# Patient Record
Sex: FEMALE | Race: WHITE | NOT HISPANIC OR LATINO | Employment: PART TIME | URBAN - METROPOLITAN AREA
[De-identification: names, ages, dates, MRNs, and addresses within clinical notes are randomized per-mention and may not be internally consistent; named-entity substitution may affect disease eponyms.]

---

## 2017-05-23 ENCOUNTER — HISTORICAL (OUTPATIENT)
Dept: RADIOLOGY | Facility: HOSPITAL | Age: 47
End: 2017-05-23

## 2017-06-09 ENCOUNTER — HISTORICAL (OUTPATIENT)
Dept: RADIOLOGY | Facility: HOSPITAL | Age: 47
End: 2017-06-09

## 2017-12-06 ENCOUNTER — HISTORICAL (OUTPATIENT)
Dept: RADIOLOGY | Facility: HOSPITAL | Age: 47
End: 2017-12-06

## 2018-02-15 ENCOUNTER — HISTORICAL (OUTPATIENT)
Dept: RADIOLOGY | Facility: HOSPITAL | Age: 48
End: 2018-02-15

## 2018-03-07 ENCOUNTER — HISTORICAL (OUTPATIENT)
Dept: RADIOLOGY | Facility: HOSPITAL | Age: 48
End: 2018-03-07

## 2018-03-15 ENCOUNTER — HISTORICAL (OUTPATIENT)
Dept: RADIOLOGY | Facility: HOSPITAL | Age: 48
End: 2018-03-15

## 2018-03-16 ENCOUNTER — HISTORICAL (OUTPATIENT)
Dept: LAB | Facility: HOSPITAL | Age: 48
End: 2018-03-16

## 2018-04-02 ENCOUNTER — HISTORICAL (OUTPATIENT)
Dept: RADIOLOGY | Facility: HOSPITAL | Age: 48
End: 2018-04-02

## 2018-04-03 ENCOUNTER — HISTORICAL (OUTPATIENT)
Dept: INFUSION THERAPY | Facility: HOSPITAL | Age: 48
End: 2018-04-03

## 2018-04-03 LAB
ABS NEUT (OLG): 3.8 X10(3)/MCL (ref 1.5–6.9)
ALBUMIN SERPL-MCNC: 3.8 GM/DL (ref 3.4–5)
ALBUMIN/GLOB SERPL: 0.8 RATIO
ALP SERPL-CCNC: 77 UNIT/L (ref 30–113)
ALT SERPL-CCNC: 19 UNIT/L (ref 10–45)
AST SERPL-CCNC: 16 UNIT/L (ref 15–37)
BASOPHILS # BLD AUTO: 0 X10(3)/MCL (ref 0–0.1)
BASOPHILS NFR BLD AUTO: 0 % (ref 0–1)
BILIRUB SERPL-MCNC: 0.4 MG/DL (ref 0.1–0.9)
BILIRUBIN DIRECT+TOT PNL SERPL-MCNC: 0.1 MG/DL (ref 0–0.3)
BILIRUBIN DIRECT+TOT PNL SERPL-MCNC: 0.3 MG/DL
BUN SERPL-MCNC: 11 MG/DL (ref 10–20)
CALCIUM SERPL-MCNC: 8.8 MG/DL (ref 8–10.5)
CHLORIDE SERPL-SCNC: 100 MMOL/L (ref 100–108)
CO2 SERPL-SCNC: 32 MMOL/L (ref 21–35)
CREAT SERPL-MCNC: 1.25 MG/DL (ref 0.7–1.3)
EOSINOPHIL # BLD AUTO: 0.2 X10(3)/MCL (ref 0–0.6)
EOSINOPHIL NFR BLD AUTO: 2 % (ref 0–5)
ERYTHROCYTE [DISTWIDTH] IN BLOOD BY AUTOMATED COUNT: 12.3 % (ref 11.5–17)
GLOBULIN SER-MCNC: 4.5 GM/DL
GLUCOSE SERPL-MCNC: 88 MG/DL (ref 75–116)
HBV SURFACE AG SERPL QL IA: NEGATIVE
HCT VFR BLD AUTO: 42.1 % (ref 36–48)
HCV AB SERPL QL IA: NEGATIVE
HGB BLD-MCNC: 13.9 GM/DL (ref 12–16)
HIV 1+2 AB+HIV1 P24 AG SERPL QL IA: NEGATIVE
LDH SERPL-CCNC: 155 UNIT/L (ref 112–240)
LYMPHOCYTES # BLD AUTO: 3.4 X10(3)/MCL (ref 0.5–4.1)
LYMPHOCYTES NFR BLD AUTO: 42.7 % (ref 15–40)
MCH RBC QN AUTO: 31 PG (ref 27–34)
MCHC RBC AUTO-ENTMCNC: 33 GM/DL (ref 31–36)
MCV RBC AUTO: 94 FL (ref 80–99)
MONOCYTES # BLD AUTO: 0.4 X10(3)/MCL (ref 0–1.1)
MONOCYTES NFR BLD AUTO: 6 % (ref 4–12)
NEUTROPHILS # BLD AUTO: 3.8 X10(3)/MCL (ref 1.5–6.9)
NEUTROPHILS NFR BLD AUTO: 48 % (ref 43–75)
PLATELET # BLD AUTO: 236 X10(3)/MCL (ref 140–400)
PMV BLD AUTO: 10.2 FL (ref 6.8–10)
POTASSIUM SERPL-SCNC: 3.9 MMOL/L (ref 3.6–5.2)
PROT SERPL-MCNC: 8.3 GM/DL (ref 6.4–8.2)
RBC # BLD AUTO: 4.5 X10(6)/MCL (ref 4.2–5.4)
SODIUM SERPL-SCNC: 140 MMOL/L (ref 135–145)
URATE SERPL-MCNC: 4.7 MG/DL (ref 2.6–7.2)
WBC # SPEC AUTO: 7.9 X10(3)/MCL (ref 4.5–11.5)

## 2018-04-05 ENCOUNTER — HISTORICAL (OUTPATIENT)
Dept: ANESTHESIOLOGY | Facility: HOSPITAL | Age: 48
End: 2018-04-05

## 2018-04-05 LAB
ABS NEUT (OLG): 5 X10(3)/MCL (ref 1.5–6.9)
BASOPHILS # BLD AUTO: 0 X10(3)/MCL (ref 0–0.1)
BASOPHILS NFR BLD AUTO: 0 % (ref 0–1)
EOSINOPHIL # BLD AUTO: 0.3 X10(3)/MCL (ref 0–0.6)
EOSINOPHIL NFR BLD AUTO: 3 % (ref 0–5)
ERYTHROCYTE [DISTWIDTH] IN BLOOD BY AUTOMATED COUNT: 12.4 % (ref 11.5–17)
HCT VFR BLD AUTO: 42.9 % (ref 36–48)
HGB BLD-MCNC: 13.9 GM/DL (ref 12–16)
LYMPHOCYTES # BLD AUTO: 3.8 X10(3)/MCL (ref 0.5–4.1)
LYMPHOCYTES NFR BLD AUTO: 38.8 % (ref 15–40)
MCH RBC QN AUTO: 30 PG (ref 27–34)
MCHC RBC AUTO-ENTMCNC: 32 GM/DL (ref 31–36)
MCV RBC AUTO: 94 FL (ref 80–99)
MONOCYTES # BLD AUTO: 0.7 X10(3)/MCL (ref 0–1.1)
MONOCYTES NFR BLD AUTO: 7 % (ref 4–12)
NEUTROPHILS # BLD AUTO: 5 X10(3)/MCL (ref 1.5–6.9)
NEUTROPHILS NFR BLD AUTO: 51 % (ref 43–75)
PLATELET # BLD AUTO: 236 X10(3)/MCL (ref 140–400)
PMV BLD AUTO: 10.3 FL (ref 6.8–10)
RBC # BLD AUTO: 4.59 X10(6)/MCL (ref 4.2–5.4)
WBC # SPEC AUTO: 9.9 X10(3)/MCL (ref 4.5–11.5)

## 2018-04-09 ENCOUNTER — HISTORICAL (OUTPATIENT)
Dept: RADIOLOGY | Facility: HOSPITAL | Age: 48
End: 2018-04-09

## 2018-04-09 ENCOUNTER — HISTORICAL (OUTPATIENT)
Dept: SURGERY | Facility: HOSPITAL | Age: 48
End: 2018-04-09

## 2018-04-09 LAB
ALBUMIN SERPL-MCNC: 3.6 GM/DL (ref 3.4–5)
ALBUMIN/GLOB SERPL: 0.8 RATIO
ALP SERPL-CCNC: 73 UNIT/L (ref 30–113)
ALT SERPL-CCNC: 19 UNIT/L (ref 10–45)
APTT PPP: 27.5 SECOND(S) (ref 25–35)
AST SERPL-CCNC: 17 UNIT/L (ref 15–37)
BILIRUB SERPL-MCNC: 0.3 MG/DL (ref 0.1–0.9)
BILIRUBIN DIRECT+TOT PNL SERPL-MCNC: 0.1 MG/DL (ref 0–0.3)
BILIRUBIN DIRECT+TOT PNL SERPL-MCNC: 0.2 MG/DL
BUN SERPL-MCNC: 10 MG/DL (ref 10–20)
CALCIUM SERPL-MCNC: 9.2 MG/DL (ref 8–10.5)
CHLORIDE SERPL-SCNC: 103 MMOL/L (ref 100–108)
CO2 SERPL-SCNC: 34 MMOL/L (ref 21–35)
CREAT SERPL-MCNC: 1.2 MG/DL (ref 0.7–1.3)
GLOBULIN SER-MCNC: 4.3 GM/DL
GLUCOSE SERPL-MCNC: 81 MG/DL (ref 75–116)
INR PPP: 0.9 (ref 0–1.2)
POTASSIUM SERPL-SCNC: 3.9 MMOL/L (ref 3.6–5.2)
PROT SERPL-MCNC: 7.9 GM/DL (ref 6.4–8.2)
PROTHROMBIN TIME: 9.3 SECOND(S) (ref 9–12)
SODIUM SERPL-SCNC: 142 MMOL/L (ref 135–145)

## 2018-04-12 ENCOUNTER — HISTORICAL (OUTPATIENT)
Dept: ANESTHESIOLOGY | Facility: HOSPITAL | Age: 48
End: 2018-04-12

## 2018-04-19 ENCOUNTER — HISTORICAL (OUTPATIENT)
Dept: RADIOLOGY | Facility: HOSPITAL | Age: 48
End: 2018-04-19

## 2018-04-24 ENCOUNTER — HISTORICAL (OUTPATIENT)
Dept: INFUSION THERAPY | Facility: HOSPITAL | Age: 48
End: 2018-04-24

## 2018-04-24 LAB
ABS NEUT (OLG): 4.2 X10(3)/MCL (ref 1.5–6.9)
ALBUMIN SERPL-MCNC: 3.9 GM/DL (ref 3.4–5)
ALBUMIN/GLOB SERPL: 0.9 RATIO
ALP SERPL-CCNC: 71 UNIT/L (ref 30–113)
ALT SERPL-CCNC: 17 UNIT/L (ref 10–45)
AST SERPL-CCNC: 27 UNIT/L (ref 15–37)
BASOPHILS # BLD AUTO: 0 X10(3)/MCL (ref 0–0.1)
BASOPHILS NFR BLD AUTO: 0 % (ref 0–1)
BILIRUB SERPL-MCNC: 0.5 MG/DL (ref 0.1–0.9)
BILIRUBIN DIRECT+TOT PNL SERPL-MCNC: 0.1 MG/DL (ref 0–0.3)
BILIRUBIN DIRECT+TOT PNL SERPL-MCNC: 0.4 MG/DL
BUN SERPL-MCNC: 15 MG/DL (ref 10–20)
CALCIUM SERPL-MCNC: 9.2 MG/DL (ref 8–10.5)
CHLORIDE SERPL-SCNC: 100 MMOL/L (ref 100–108)
CO2 SERPL-SCNC: 28 MMOL/L (ref 21–35)
CREAT SERPL-MCNC: 1.15 MG/DL (ref 0.7–1.3)
EOSINOPHIL # BLD AUTO: 0.2 X10(3)/MCL (ref 0–0.6)
EOSINOPHIL NFR BLD AUTO: 3 % (ref 0–5)
ERYTHROCYTE [DISTWIDTH] IN BLOOD BY AUTOMATED COUNT: 12.9 % (ref 11.5–17)
GLOBULIN SER-MCNC: 4.5 GM/DL
GLUCOSE SERPL-MCNC: 78 MG/DL (ref 75–116)
HCT VFR BLD AUTO: 39.8 % (ref 36–48)
HGB BLD-MCNC: 13.2 GM/DL (ref 12–16)
LDH SERPL-CCNC: 303 UNIT/L (ref 112–240)
LYMPHOCYTES # BLD AUTO: 4.4 X10(3)/MCL (ref 0.5–4.1)
LYMPHOCYTES NFR BLD AUTO: 45.8 % (ref 15–40)
MCH RBC QN AUTO: 31 PG (ref 27–34)
MCHC RBC AUTO-ENTMCNC: 33 GM/DL (ref 31–36)
MCV RBC AUTO: 94 FL (ref 80–99)
MONOCYTES # BLD AUTO: 0.7 X10(3)/MCL (ref 0–1.1)
MONOCYTES NFR BLD AUTO: 7 % (ref 4–12)
NEUTROPHILS # BLD AUTO: 4.2 X10(3)/MCL (ref 1.5–6.9)
NEUTROPHILS NFR BLD AUTO: 44 % (ref 43–75)
PLATELET # BLD AUTO: 256 X10(3)/MCL (ref 140–400)
PMV BLD AUTO: 10.5 FL (ref 6.8–10)
POTASSIUM SERPL-SCNC: 4.5 MMOL/L (ref 3.6–5.2)
PROT SERPL-MCNC: 8.4 GM/DL (ref 6.4–8.2)
RBC # BLD AUTO: 4.23 X10(6)/MCL (ref 4.2–5.4)
SODIUM SERPL-SCNC: 137 MMOL/L (ref 135–145)
URATE SERPL-MCNC: 4.5 MG/DL (ref 2.6–7.2)
WBC # SPEC AUTO: 9.6 X10(3)/MCL (ref 4.5–11.5)

## 2018-04-26 ENCOUNTER — HISTORICAL (OUTPATIENT)
Dept: INFUSION THERAPY | Facility: HOSPITAL | Age: 48
End: 2018-04-26

## 2018-04-27 ENCOUNTER — HISTORICAL (OUTPATIENT)
Dept: INFUSION THERAPY | Facility: HOSPITAL | Age: 48
End: 2018-04-27

## 2018-05-02 ENCOUNTER — HISTORICAL (OUTPATIENT)
Dept: INFUSION THERAPY | Facility: HOSPITAL | Age: 48
End: 2018-05-02

## 2018-05-02 LAB
ABS NEUT (OLG): 0.1 X10(3)/MCL (ref 1.5–6.9)
ALBUMIN SERPL-MCNC: 3.5 GM/DL (ref 3.4–5)
ALBUMIN/GLOB SERPL: 1 RATIO
ALP SERPL-CCNC: 71 UNIT/L (ref 30–113)
ALT SERPL-CCNC: 16 UNIT/L (ref 10–45)
AST SERPL-CCNC: 10 UNIT/L (ref 15–37)
BASOPHILS NFR BLD MANUAL: 0 % (ref 0–1)
BILIRUB SERPL-MCNC: 0.3 MG/DL (ref 0.1–0.9)
BILIRUBIN DIRECT+TOT PNL SERPL-MCNC: 0.1 MG/DL (ref 0–0.3)
BILIRUBIN DIRECT+TOT PNL SERPL-MCNC: 0.2 MG/DL
BUN SERPL-MCNC: 17 MG/DL (ref 10–20)
CALCIUM SERPL-MCNC: 8.8 MG/DL (ref 8–10.5)
CHLORIDE SERPL-SCNC: 100 MMOL/L (ref 100–108)
CO2 SERPL-SCNC: 36 MMOL/L (ref 21–35)
CREAT SERPL-MCNC: 1.07 MG/DL (ref 0.7–1.3)
EOSINOPHIL NFR BLD MANUAL: 0 % (ref 0–5)
ERYTHROCYTE [DISTWIDTH] IN BLOOD BY AUTOMATED COUNT: 12.3 % (ref 11.5–17)
GLOBULIN SER-MCNC: 3.5 GM/DL
GLUCOSE SERPL-MCNC: 110 MG/DL (ref 75–116)
GRANULOCYTES NFR BLD MANUAL: 6 % (ref 47–80)
HCT VFR BLD AUTO: 35.6 % (ref 36–48)
HGB BLD-MCNC: 12 GM/DL (ref 12–16)
LYMPHOCYTES NFR BLD MANUAL: 91 % (ref 15–40)
MCH RBC QN AUTO: 32 PG (ref 27–34)
MCHC RBC AUTO-ENTMCNC: 34 GM/DL (ref 31–36)
MCV RBC AUTO: 93 FL (ref 80–99)
MONOCYTES NFR BLD MANUAL: 3 % (ref 4–12)
PLATELET # BLD AUTO: 140 X10(3)/MCL (ref 140–400)
PLATELET # BLD EST: ADEQUATE 10*3/UL
PMV BLD AUTO: 10.5 FL (ref 6.8–10)
POTASSIUM SERPL-SCNC: 4.1 MMOL/L (ref 3.6–5.2)
PROT SERPL-MCNC: 7 GM/DL (ref 6.4–8.2)
RBC # BLD AUTO: 3.81 X10(6)/MCL (ref 4.2–5.4)
RBC MORPH BLD: NORMAL
SODIUM SERPL-SCNC: 140 MMOL/L (ref 135–145)
WBC # SPEC AUTO: 1.5 X10(3)/MCL (ref 4.5–11.5)

## 2018-05-09 ENCOUNTER — HISTORICAL (OUTPATIENT)
Dept: LAB | Facility: HOSPITAL | Age: 48
End: 2018-05-09

## 2018-05-09 LAB
ABS NEUT (OLG): 6.3 X10(3)/MCL (ref 1.5–6.9)
ALBUMIN SERPL-MCNC: 3.6 GM/DL (ref 3.4–5)
ALBUMIN/GLOB SERPL: 0.9 RATIO
ALP SERPL-CCNC: 85 UNIT/L (ref 30–113)
ALT SERPL-CCNC: 21 UNIT/L (ref 10–45)
AST SERPL-CCNC: 16 UNIT/L (ref 15–37)
BASOPHILS NFR BLD MANUAL: 0 % (ref 0–1)
BILIRUB SERPL-MCNC: 0.2 MG/DL (ref 0.1–0.9)
BILIRUBIN DIRECT+TOT PNL SERPL-MCNC: 0.1 MG/DL
BILIRUBIN DIRECT+TOT PNL SERPL-MCNC: 0.1 MG/DL (ref 0–0.3)
BUN SERPL-MCNC: 12 MG/DL (ref 10–20)
CALCIUM SERPL-MCNC: 8.4 MG/DL (ref 8–10.5)
CHLORIDE SERPL-SCNC: 100 MMOL/L (ref 100–108)
CO2 SERPL-SCNC: 30 MMOL/L (ref 21–35)
CREAT SERPL-MCNC: 1.14 MG/DL (ref 0.7–1.3)
EOSINOPHIL NFR BLD MANUAL: 0 % (ref 0–5)
ERYTHROCYTE [DISTWIDTH] IN BLOOD BY AUTOMATED COUNT: 12.4 % (ref 11.5–17)
GLOBULIN SER-MCNC: 3.9 GM/DL
GLUCOSE SERPL-MCNC: 133 MG/DL (ref 75–116)
GRANULOCYTES NFR BLD MANUAL: 65 % (ref 47–80)
HCT VFR BLD AUTO: 37 % (ref 36–48)
HGB BLD-MCNC: 12.3 GM/DL (ref 12–16)
LDH SERPL-CCNC: 194 UNIT/L (ref 112–240)
LYMPHOCYTES NFR BLD MANUAL: 26 % (ref 15–40)
MCH RBC QN AUTO: 31 PG (ref 27–34)
MCHC RBC AUTO-ENTMCNC: 33 GM/DL (ref 31–36)
MCV RBC AUTO: 93 FL (ref 80–99)
MONOCYTES NFR BLD MANUAL: 9 % (ref 4–12)
PLATELET # BLD AUTO: 195 X10(3)/MCL (ref 140–400)
PLATELET # BLD EST: ADEQUATE 10*3/UL
PMV BLD AUTO: 10.2 FL (ref 6.8–10)
POTASSIUM SERPL-SCNC: 3.5 MMOL/L (ref 3.6–5.2)
PROT SERPL-MCNC: 7.5 GM/DL (ref 6.4–8.2)
RBC # BLD AUTO: 3.98 X10(6)/MCL (ref 4.2–5.4)
RBC MORPH BLD: NORMAL
SODIUM SERPL-SCNC: 138 MMOL/L (ref 135–145)
WBC # SPEC AUTO: 10.1 X10(3)/MCL (ref 4.5–11.5)

## 2018-05-12 ENCOUNTER — HOSPITAL ENCOUNTER (OUTPATIENT)
Dept: MEDSURG UNIT | Facility: HOSPITAL | Age: 48
End: 2018-05-14
Attending: INTERNAL MEDICINE | Admitting: INTERNAL MEDICINE

## 2018-05-12 LAB
ABS NEUT (OLG): 6.9 X10(3)/MCL (ref 1.5–6.9)
ALBUMIN SERPL-MCNC: 3.5 GM/DL (ref 3.4–5)
ALBUMIN/GLOB SERPL: 0.9 RATIO
ALP SERPL-CCNC: 72 UNIT/L (ref 30–113)
ALT SERPL-CCNC: 23 UNIT/L (ref 10–45)
APPEARANCE, UA: CLEAR
AST SERPL-CCNC: 15 UNIT/L (ref 15–37)
BACTERIA SPEC CULT: NORMAL /HPF
BASOPHILS # BLD AUTO: 0.1 X10(3)/MCL (ref 0–0.1)
BASOPHILS NFR BLD AUTO: 1 % (ref 0–1)
BILIRUB SERPL-MCNC: 0.2 MG/DL (ref 0.1–0.9)
BILIRUB UR QL STRIP: NEGATIVE
BILIRUBIN DIRECT+TOT PNL SERPL-MCNC: 0.1 MG/DL
BILIRUBIN DIRECT+TOT PNL SERPL-MCNC: 0.1 MG/DL (ref 0–0.3)
BUN SERPL-MCNC: 17 MG/DL (ref 10–20)
CALCIUM SERPL-MCNC: 8.6 MG/DL (ref 8–10.5)
CHLORIDE SERPL-SCNC: 99 MMOL/L (ref 100–108)
CK MB SERPL-MCNC: <0.5 NG/ML (ref 0–3.6)
CK SERPL-CCNC: 46 UNIT/L (ref 39–225)
CO2 SERPL-SCNC: 29 MMOL/L (ref 21–35)
COLOR UR: ABNORMAL
CREAT SERPL-MCNC: 1.22 MG/DL (ref 0.7–1.3)
EOSINOPHIL # BLD AUTO: 0 X10(3)/MCL (ref 0–0.6)
EOSINOPHIL NFR BLD AUTO: 0 % (ref 0–5)
ERYTHROCYTE [DISTWIDTH] IN BLOOD BY AUTOMATED COUNT: 12.5 % (ref 11.5–17)
GLOBULIN SER-MCNC: 3.9 GM/DL
GLUCOSE (UA): NEGATIVE
GLUCOSE SERPL-MCNC: 121 MG/DL (ref 75–116)
HCT VFR BLD AUTO: 35.1 % (ref 36–48)
HGB BLD-MCNC: 11.7 GM/DL (ref 12–16)
HGB UR QL STRIP: ABNORMAL
KETONES UR QL STRIP: NEGATIVE
LEUKOCYTE ESTERASE UR QL STRIP: NEGATIVE
LYMPHOCYTES # BLD AUTO: 2.3 X10(3)/MCL (ref 0.5–4.1)
LYMPHOCYTES NFR BLD AUTO: 22.5 % (ref 15–40)
MAGNESIUM SERPL-MCNC: 2.2 MG/DL (ref 1.8–2.4)
MCH RBC QN AUTO: 31 PG (ref 27–34)
MCHC RBC AUTO-ENTMCNC: 33 GM/DL (ref 31–36)
MCV RBC AUTO: 93 FL (ref 80–99)
MONOCYTES # BLD AUTO: 0.8 X10(3)/MCL (ref 0–1.1)
MONOCYTES NFR BLD AUTO: 8 % (ref 4–12)
NEUTROPHILS # BLD AUTO: 6.9 X10(3)/MCL (ref 1.5–6.9)
NEUTROPHILS NFR BLD AUTO: 68 % (ref 43–75)
NITRITE UR QL STRIP: NEGATIVE
PH UR STRIP: 6 [PH]
PLATELET # BLD AUTO: 364 X10(3)/MCL (ref 140–400)
PMV BLD AUTO: 9.9 FL (ref 6.8–10)
POTASSIUM SERPL-SCNC: 3.3 MMOL/L (ref 3.6–5.2)
PROT SERPL-MCNC: 7.4 GM/DL (ref 6.4–8.2)
PROT UR QL STRIP: NEGATIVE
RBC # BLD AUTO: 3.79 X10(6)/MCL (ref 4.2–5.4)
RBC #/AREA URNS HPF: NORMAL /HPF
SODIUM SERPL-SCNC: 137 MMOL/L (ref 135–145)
SP GR UR STRIP: 1.01
SQUAMOUS EPITHELIAL, UA: NORMAL /LPF
TROPONIN I SERPL-MCNC: <0.017 NG/ML (ref 0–0.06)
UROBILINOGEN UR STRIP-ACNC: 0.2 EU/DL
WBC # SPEC AUTO: 10 X10(3)/MCL (ref 4.5–11.5)
WBC #/AREA URNS HPF: NORMAL /[HPF]

## 2018-05-13 LAB
CK MB SERPL-MCNC: <0.5 NG/ML (ref 0–3.6)
CK SERPL-CCNC: 43 UNIT/L (ref 39–225)
TROPONIN I SERPL-MCNC: <0.017 NG/ML (ref 0–0.06)

## 2018-05-14 LAB
ABS NEUT (OLG): 3.8 X10(3)/MCL (ref 1.5–6.9)
BASOPHILS # BLD AUTO: 0.1 X10(3)/MCL (ref 0–0.1)
BASOPHILS NFR BLD AUTO: 1 % (ref 0–1)
BUN SERPL-MCNC: 15 MG/DL (ref 10–20)
CALCIUM SERPL-MCNC: 8.1 MG/DL (ref 8–10.5)
CHLORIDE SERPL-SCNC: 107 MMOL/L (ref 100–108)
CO2 SERPL-SCNC: 26 MMOL/L (ref 21–35)
CREAT SERPL-MCNC: 1.01 MG/DL (ref 0.7–1.3)
CREAT/UREA NIT SERPL: 15
EOSINOPHIL # BLD AUTO: 0 X10(3)/MCL (ref 0–0.6)
EOSINOPHIL NFR BLD AUTO: 0 % (ref 0–5)
ERYTHROCYTE [DISTWIDTH] IN BLOOD BY AUTOMATED COUNT: 12.6 % (ref 11.5–17)
GLUCOSE SERPL-MCNC: 85 MG/DL (ref 75–116)
HCT VFR BLD AUTO: 33.1 % (ref 36–48)
HGB BLD-MCNC: 11 GM/DL (ref 12–16)
LYMPHOCYTES # BLD AUTO: 2.4 X10(3)/MCL (ref 0.5–4.1)
LYMPHOCYTES NFR BLD AUTO: 35.4 % (ref 15–40)
MCH RBC QN AUTO: 31 PG (ref 27–34)
MCHC RBC AUTO-ENTMCNC: 33 GM/DL (ref 31–36)
MCV RBC AUTO: 94 FL (ref 80–99)
MONOCYTES # BLD AUTO: 0.5 X10(3)/MCL (ref 0–1.1)
MONOCYTES NFR BLD AUTO: 8 % (ref 4–12)
NEUTROPHILS # BLD AUTO: 3.8 X10(3)/MCL (ref 1.5–6.9)
NEUTROPHILS NFR BLD AUTO: 56 % (ref 43–75)
PLATELET # BLD AUTO: 326 X10(3)/MCL (ref 140–400)
PMV BLD AUTO: 9.8 FL (ref 6.8–10)
POTASSIUM SERPL-SCNC: 3.6 MMOL/L (ref 3.6–5.2)
RBC # BLD AUTO: 3.51 X10(6)/MCL (ref 4.2–5.4)
SODIUM SERPL-SCNC: 142 MMOL/L (ref 135–145)
WBC # SPEC AUTO: 6.9 X10(3)/MCL (ref 4.5–11.5)

## 2018-05-16 ENCOUNTER — HISTORICAL (OUTPATIENT)
Dept: LAB | Facility: HOSPITAL | Age: 48
End: 2018-05-16

## 2018-05-16 LAB
ABS NEUT (OLG): 4.8 X10(3)/MCL (ref 1.5–6.9)
ALBUMIN SERPL-MCNC: 3.3 GM/DL (ref 3.4–5)
ALBUMIN/GLOB SERPL: 0.9 RATIO
ALP SERPL-CCNC: 61 UNIT/L (ref 30–113)
ALT SERPL-CCNC: 18 UNIT/L (ref 10–45)
AST SERPL-CCNC: 14 UNIT/L (ref 15–37)
BASOPHILS # BLD AUTO: 0 X10(3)/MCL (ref 0–0.1)
BASOPHILS NFR BLD AUTO: 1 % (ref 0–1)
BILIRUB SERPL-MCNC: 0.2 MG/DL (ref 0.1–0.9)
BILIRUBIN DIRECT+TOT PNL SERPL-MCNC: 0.1 MG/DL
BILIRUBIN DIRECT+TOT PNL SERPL-MCNC: 0.1 MG/DL (ref 0–0.3)
BUN SERPL-MCNC: 13 MG/DL (ref 10–20)
CALCIUM SERPL-MCNC: 8.7 MG/DL (ref 8–10.5)
CHLORIDE SERPL-SCNC: 107 MMOL/L (ref 100–108)
CO2 SERPL-SCNC: 29 MMOL/L (ref 21–35)
CREAT SERPL-MCNC: 0.98 MG/DL (ref 0.7–1.3)
EOSINOPHIL # BLD AUTO: 0 X10(3)/MCL (ref 0–0.6)
EOSINOPHIL NFR BLD AUTO: 0 % (ref 0–5)
ERYTHROCYTE [DISTWIDTH] IN BLOOD BY AUTOMATED COUNT: 13.1 % (ref 11.5–17)
GLOBULIN SER-MCNC: 3.7 GM/DL
GLUCOSE SERPL-MCNC: 98 MG/DL (ref 75–116)
HCT VFR BLD AUTO: 34.6 % (ref 36–48)
HGB BLD-MCNC: 11 GM/DL (ref 12–16)
LDH SERPL-CCNC: 172 UNIT/L (ref 112–240)
LYMPHOCYTES # BLD AUTO: 1.7 X10(3)/MCL (ref 0.5–4.1)
LYMPHOCYTES NFR BLD AUTO: 23.8 % (ref 15–40)
MCH RBC QN AUTO: 30 PG (ref 27–34)
MCHC RBC AUTO-ENTMCNC: 32 GM/DL (ref 31–36)
MCV RBC AUTO: 95 FL (ref 80–99)
MONOCYTES # BLD AUTO: 0.6 X10(3)/MCL (ref 0–1.1)
MONOCYTES NFR BLD AUTO: 8 % (ref 4–12)
NEUTROPHILS # BLD AUTO: 4.8 X10(3)/MCL (ref 1.5–6.9)
NEUTROPHILS NFR BLD AUTO: 67 % (ref 43–75)
PLATELET # BLD AUTO: 413 X10(3)/MCL (ref 140–400)
PMV BLD AUTO: 9.8 FL (ref 6.8–10)
POTASSIUM SERPL-SCNC: 3.8 MMOL/L (ref 3.6–5.2)
PROT SERPL-MCNC: 7 GM/DL (ref 6.4–8.2)
RBC # BLD AUTO: 3.65 X10(6)/MCL (ref 4.2–5.4)
SODIUM SERPL-SCNC: 143 MMOL/L (ref 135–145)
WBC # SPEC AUTO: 7.1 X10(3)/MCL (ref 4.5–11.5)

## 2018-05-17 ENCOUNTER — HISTORICAL (OUTPATIENT)
Dept: INFUSION THERAPY | Facility: HOSPITAL | Age: 48
End: 2018-05-17

## 2018-05-18 ENCOUNTER — HISTORICAL (OUTPATIENT)
Dept: INFUSION THERAPY | Facility: HOSPITAL | Age: 48
End: 2018-05-18

## 2018-05-23 ENCOUNTER — HISTORICAL (OUTPATIENT)
Dept: LAB | Facility: HOSPITAL | Age: 48
End: 2018-05-23

## 2018-05-23 LAB
ABS NEUT (OLG): 0.1 X10(3)/MCL (ref 1.5–6.9)
ALBUMIN SERPL-MCNC: 3.2 GM/DL (ref 3.4–5)
ALBUMIN/GLOB SERPL: 0.9 RATIO
ALP SERPL-CCNC: 69 UNIT/L (ref 30–113)
ALT SERPL-CCNC: 14 UNIT/L (ref 10–45)
AST SERPL-CCNC: 9 UNIT/L (ref 15–37)
BASOPHILS NFR BLD MANUAL: 0 % (ref 0–1)
BILIRUB SERPL-MCNC: 0.5 MG/DL (ref 0.1–0.9)
BILIRUBIN DIRECT+TOT PNL SERPL-MCNC: 0.1 MG/DL (ref 0–0.3)
BILIRUBIN DIRECT+TOT PNL SERPL-MCNC: 0.4 MG/DL
BUN SERPL-MCNC: 18 MG/DL (ref 10–20)
CALCIUM SERPL-MCNC: 8.5 MG/DL (ref 8–10.5)
CHLORIDE SERPL-SCNC: 102 MMOL/L (ref 100–108)
CO2 SERPL-SCNC: 32 MMOL/L (ref 21–35)
CREAT SERPL-MCNC: 1.08 MG/DL (ref 0.7–1.3)
EOSINOPHIL NFR BLD MANUAL: 3 % (ref 0–5)
ERYTHROCYTE [DISTWIDTH] IN BLOOD BY AUTOMATED COUNT: 12.8 % (ref 11.5–17)
GLOBULIN SER-MCNC: 3.5 GM/DL
GLUCOSE SERPL-MCNC: 98 MG/DL (ref 75–116)
GRANULOCYTES NFR BLD MANUAL: 9 % (ref 47–80)
HCT VFR BLD AUTO: 30.6 % (ref 36–48)
HGB BLD-MCNC: 9.9 GM/DL (ref 12–16)
LDH SERPL-CCNC: 144 UNIT/L (ref 112–240)
LYMPHOCYTES NFR BLD MANUAL: 84 % (ref 15–40)
MCH RBC QN AUTO: 31 PG (ref 27–34)
MCHC RBC AUTO-ENTMCNC: 32 GM/DL (ref 31–36)
MCV RBC AUTO: 96 FL (ref 80–99)
MONOCYTES NFR BLD MANUAL: 4 % (ref 4–12)
PLATELET # BLD AUTO: 184 X10(3)/MCL (ref 140–400)
PLATELET # BLD EST: ADEQUATE 10*3/UL
PMV BLD AUTO: 10.9 FL (ref 6.8–10)
POTASSIUM SERPL-SCNC: 3.6 MMOL/L (ref 3.6–5.2)
PROT SERPL-MCNC: 6.7 GM/DL (ref 6.4–8.2)
RBC # BLD AUTO: 3.2 X10(6)/MCL (ref 4.2–5.4)
RBC MORPH BLD: NORMAL
SODIUM SERPL-SCNC: 142 MMOL/L (ref 135–145)
WBC # SPEC AUTO: 1 X10(3)/MCL (ref 4.5–11.5)

## 2018-05-25 ENCOUNTER — HISTORICAL (OUTPATIENT)
Dept: RADIOLOGY | Facility: HOSPITAL | Age: 48
End: 2018-05-25

## 2018-05-30 ENCOUNTER — HISTORICAL (OUTPATIENT)
Dept: LAB | Facility: HOSPITAL | Age: 48
End: 2018-05-30

## 2018-05-30 LAB
ABS NEUT (OLG): 7.3 X10(3)/MCL (ref 1.5–6.9)
ALBUMIN SERPL-MCNC: 3.8 GM/DL (ref 3.4–5)
ALBUMIN/GLOB SERPL: 1 RATIO
ALP SERPL-CCNC: 77 UNIT/L (ref 30–113)
ALT SERPL-CCNC: 18 UNIT/L (ref 10–45)
AST SERPL-CCNC: 16 UNIT/L (ref 15–37)
BASOPHILS NFR BLD MANUAL: 0 % (ref 0–1)
BILIRUB SERPL-MCNC: 0.3 MG/DL (ref 0.1–0.9)
BILIRUBIN DIRECT+TOT PNL SERPL-MCNC: 0.1 MG/DL (ref 0–0.3)
BILIRUBIN DIRECT+TOT PNL SERPL-MCNC: 0.2 MG/DL
BUN SERPL-MCNC: 16 MG/DL (ref 10–20)
CALCIUM SERPL-MCNC: 9.1 MG/DL (ref 8–10.5)
CHLORIDE SERPL-SCNC: 103 MMOL/L (ref 100–108)
CO2 SERPL-SCNC: 28 MMOL/L (ref 21–35)
CREAT SERPL-MCNC: 1.26 MG/DL (ref 0.7–1.3)
EOSINOPHIL NFR BLD MANUAL: 0 % (ref 0–5)
ERYTHROCYTE [DISTWIDTH] IN BLOOD BY AUTOMATED COUNT: 13.2 % (ref 11.5–17)
GLOBULIN SER-MCNC: 3.9 GM/DL
GLUCOSE SERPL-MCNC: 84 MG/DL (ref 75–116)
GRANULOCYTES NFR BLD MANUAL: 59 % (ref 47–80)
HCT VFR BLD AUTO: 32.4 % (ref 36–48)
HGB BLD-MCNC: 10.8 GM/DL (ref 12–16)
LDH SERPL-CCNC: 227 UNIT/L (ref 112–240)
LYMPHOCYTES NFR BLD MANUAL: 32 % (ref 15–40)
MCH RBC QN AUTO: 32 PG (ref 27–34)
MCHC RBC AUTO-ENTMCNC: 33 GM/DL (ref 31–36)
MCV RBC AUTO: 95 FL (ref 80–99)
MONOCYTES NFR BLD MANUAL: 6 % (ref 4–12)
NEUTS BAND NFR BLD MANUAL: 3 % (ref 1–5)
PLATELET # BLD AUTO: 158 X10(3)/MCL (ref 140–400)
PLATELET # BLD EST: ADEQUATE 10*3/UL
PMV BLD AUTO: 10.1 FL (ref 6.8–10)
POTASSIUM SERPL-SCNC: 3.6 MMOL/L (ref 3.6–5.2)
PROT SERPL-MCNC: 7.7 GM/DL (ref 6.4–8.2)
RBC # BLD AUTO: 3.4 X10(6)/MCL (ref 4.2–5.4)
RBC MORPH BLD: NORMAL
SODIUM SERPL-SCNC: 141 MMOL/L (ref 135–145)
WBC # SPEC AUTO: 12.1 X10(3)/MCL (ref 4.5–11.5)

## 2018-05-31 ENCOUNTER — HISTORICAL (OUTPATIENT)
Dept: ANESTHESIOLOGY | Facility: HOSPITAL | Age: 48
End: 2018-05-31

## 2018-06-06 ENCOUNTER — HISTORICAL (OUTPATIENT)
Dept: LAB | Facility: HOSPITAL | Age: 48
End: 2018-06-06

## 2018-06-06 LAB
ABS NEUT (OLG): 5.3 X10(3)/MCL (ref 1.5–6.9)
ALBUMIN SERPL-MCNC: 3.6 GM/DL (ref 3.4–5)
ALBUMIN/GLOB SERPL: 0.9 RATIO
ALP SERPL-CCNC: 64 UNIT/L (ref 30–113)
ALT SERPL-CCNC: 18 UNIT/L (ref 10–45)
AST SERPL-CCNC: 16 UNIT/L (ref 15–37)
BASOPHILS # BLD AUTO: 0 X10(3)/MCL (ref 0–0.1)
BASOPHILS NFR BLD AUTO: 0 % (ref 0–1)
BILIRUB SERPL-MCNC: 0.3 MG/DL (ref 0.1–0.9)
BILIRUBIN DIRECT+TOT PNL SERPL-MCNC: 0.1 MG/DL (ref 0–0.3)
BILIRUBIN DIRECT+TOT PNL SERPL-MCNC: 0.2 MG/DL
BUN SERPL-MCNC: 9 MG/DL (ref 10–20)
CALCIUM SERPL-MCNC: 9.2 MG/DL (ref 8–10.5)
CHLORIDE SERPL-SCNC: 106 MMOL/L (ref 100–108)
CO2 SERPL-SCNC: 29 MMOL/L (ref 21–35)
CREAT SERPL-MCNC: 1.02 MG/DL (ref 0.7–1.3)
EOSINOPHIL # BLD AUTO: 0 X10(3)/MCL (ref 0–0.6)
EOSINOPHIL NFR BLD AUTO: 0 % (ref 0–5)
ERYTHROCYTE [DISTWIDTH] IN BLOOD BY AUTOMATED COUNT: 14.7 % (ref 11.5–17)
GLOBULIN SER-MCNC: 4 GM/DL
GLUCOSE SERPL-MCNC: 87 MG/DL (ref 75–116)
HCT VFR BLD AUTO: 33.9 % (ref 36–48)
HGB BLD-MCNC: 10.8 GM/DL (ref 12–16)
LDH SERPL-CCNC: 168 UNIT/L (ref 112–240)
LYMPHOCYTES # BLD AUTO: 1.5 X10(3)/MCL (ref 0.5–4.1)
LYMPHOCYTES NFR BLD AUTO: 20.2 % (ref 15–40)
MCH RBC QN AUTO: 31 PG (ref 27–34)
MCHC RBC AUTO-ENTMCNC: 32 GM/DL (ref 31–36)
MCV RBC AUTO: 96 FL (ref 80–99)
MONOCYTES # BLD AUTO: 0.6 X10(3)/MCL (ref 0–1.1)
MONOCYTES NFR BLD AUTO: 8 % (ref 4–12)
NEUTROPHILS # BLD AUTO: 5.3 X10(3)/MCL (ref 1.5–6.9)
NEUTROPHILS NFR BLD AUTO: 71 % (ref 43–75)
PLATELET # BLD AUTO: 473 X10(3)/MCL (ref 140–400)
PMV BLD AUTO: 9.5 FL (ref 6.8–10)
POTASSIUM SERPL-SCNC: 4.6 MMOL/L (ref 3.6–5.2)
PROT SERPL-MCNC: 7.6 GM/DL (ref 6.4–8.2)
RBC # BLD AUTO: 3.52 X10(6)/MCL (ref 4.2–5.4)
SODIUM SERPL-SCNC: 143 MMOL/L (ref 135–145)
WBC # SPEC AUTO: 7.5 X10(3)/MCL (ref 4.5–11.5)

## 2018-06-07 ENCOUNTER — HISTORICAL (OUTPATIENT)
Dept: INFUSION THERAPY | Facility: HOSPITAL | Age: 48
End: 2018-06-07

## 2018-06-08 ENCOUNTER — HISTORICAL (OUTPATIENT)
Dept: INFUSION THERAPY | Facility: HOSPITAL | Age: 48
End: 2018-06-08

## 2018-06-13 ENCOUNTER — HISTORICAL (OUTPATIENT)
Dept: LAB | Facility: HOSPITAL | Age: 48
End: 2018-06-13

## 2018-06-13 LAB
ABS NEUT (OLG): 1 X10(3)/MCL (ref 1.5–6.9)
ALBUMIN SERPL-MCNC: 3.5 GM/DL (ref 3.4–5)
ALBUMIN/GLOB SERPL: 1 RATIO
ALP SERPL-CCNC: 66 UNIT/L (ref 30–113)
ALT SERPL-CCNC: 18 UNIT/L (ref 10–45)
AST SERPL-CCNC: 11 UNIT/L (ref 15–37)
BASOPHILS NFR BLD MANUAL: 0 % (ref 0–1)
BILIRUB SERPL-MCNC: 0.8 MG/DL (ref 0.1–0.9)
BILIRUBIN DIRECT+TOT PNL SERPL-MCNC: 0.2 MG/DL (ref 0–0.3)
BILIRUBIN DIRECT+TOT PNL SERPL-MCNC: 0.6 MG/DL
BUN SERPL-MCNC: 17 MG/DL (ref 10–20)
CALCIUM SERPL-MCNC: 8.5 MG/DL (ref 8–10.5)
CHLORIDE SERPL-SCNC: 104 MMOL/L (ref 100–108)
CO2 SERPL-SCNC: 31 MMOL/L (ref 21–35)
CREAT SERPL-MCNC: 0.92 MG/DL (ref 0.7–1.3)
EOSINOPHIL NFR BLD MANUAL: 1 % (ref 0–5)
ERYTHROCYTE [DISTWIDTH] IN BLOOD BY AUTOMATED COUNT: 14.2 % (ref 11.5–17)
GLOBULIN SER-MCNC: 3.5 GM/DL
GLUCOSE SERPL-MCNC: 88 MG/DL (ref 75–116)
GRANULOCYTES NFR BLD MANUAL: 50 % (ref 47–80)
HCT VFR BLD AUTO: 27.9 % (ref 36–48)
HGB BLD-MCNC: 9 GM/DL (ref 12–16)
LDH SERPL-CCNC: 162 UNIT/L (ref 112–240)
LYMPHOCYTES NFR BLD MANUAL: 43 % (ref 15–40)
MCH RBC QN AUTO: 31 PG (ref 27–34)
MCHC RBC AUTO-ENTMCNC: 32 GM/DL (ref 31–36)
MCV RBC AUTO: 97 FL (ref 80–99)
MONOCYTES NFR BLD MANUAL: 2 % (ref 4–12)
NEUTS BAND NFR BLD MANUAL: 4 % (ref 1–5)
PLATELET # BLD AUTO: 204 X10(3)/MCL (ref 140–400)
PLATELET # BLD EST: ADEQUATE 10*3/UL
PMV BLD AUTO: 10.3 FL (ref 6.8–10)
POTASSIUM SERPL-SCNC: 3.8 MMOL/L (ref 3.6–5.2)
PROT SERPL-MCNC: 7 GM/DL (ref 6.4–8.2)
RBC # BLD AUTO: 2.88 X10(6)/MCL (ref 4.2–5.4)
RBC MORPH BLD: NORMAL
SODIUM SERPL-SCNC: 142 MMOL/L (ref 135–145)
WBC # SPEC AUTO: 1.9 X10(3)/MCL (ref 4.5–11.5)

## 2018-06-20 ENCOUNTER — HISTORICAL (OUTPATIENT)
Dept: LAB | Facility: HOSPITAL | Age: 48
End: 2018-06-20

## 2018-06-20 ENCOUNTER — HISTORICAL (OUTPATIENT)
Dept: RADIOLOGY | Facility: HOSPITAL | Age: 48
End: 2018-06-20

## 2018-06-20 LAB
ABS NEUT (OLG): 3.8 X10(3)/MCL (ref 1.5–6.9)
ALBUMIN SERPL-MCNC: 3.7 GM/DL (ref 3.4–5)
ALBUMIN/GLOB SERPL: 1 RATIO
ALP SERPL-CCNC: 61 UNIT/L (ref 30–113)
ALT SERPL-CCNC: 22 UNIT/L (ref 10–45)
AST SERPL-CCNC: 20 UNIT/L (ref 15–37)
BASOPHILS # BLD AUTO: 0 X10(3)/MCL (ref 0–0.1)
BASOPHILS NFR BLD AUTO: 0 % (ref 0–1)
BILIRUB SERPL-MCNC: 0.2 MG/DL (ref 0.1–0.9)
BILIRUBIN DIRECT+TOT PNL SERPL-MCNC: 0.1 MG/DL
BILIRUBIN DIRECT+TOT PNL SERPL-MCNC: 0.1 MG/DL (ref 0–0.3)
BUN SERPL-MCNC: 14 MG/DL (ref 10–20)
CALCIUM SERPL-MCNC: 8.8 MG/DL (ref 8–10.5)
CHLORIDE SERPL-SCNC: 106 MMOL/L (ref 100–108)
CO2 SERPL-SCNC: 29 MMOL/L (ref 21–35)
CREAT SERPL-MCNC: 0.97 MG/DL (ref 0.7–1.3)
EOSINOPHIL # BLD AUTO: 0 X10(3)/MCL (ref 0–0.6)
EOSINOPHIL NFR BLD AUTO: 1 % (ref 0–5)
ERYTHROCYTE [DISTWIDTH] IN BLOOD BY AUTOMATED COUNT: 15.6 % (ref 11.5–17)
GLOBULIN SER-MCNC: 3.8 GM/DL
GLUCOSE SERPL-MCNC: 98 MG/DL (ref 75–116)
HCT VFR BLD AUTO: 30.1 % (ref 36–48)
HGB BLD-MCNC: 9.6 GM/DL (ref 12–16)
LDH SERPL-CCNC: 210 UNIT/L (ref 112–240)
LYMPHOCYTES # BLD AUTO: 1.5 X10(3)/MCL (ref 0.5–4.1)
LYMPHOCYTES NFR BLD AUTO: 23.4 % (ref 15–40)
MCH RBC QN AUTO: 32 PG (ref 27–34)
MCHC RBC AUTO-ENTMCNC: 32 GM/DL (ref 31–36)
MCV RBC AUTO: 99 FL (ref 80–99)
MONOCYTES # BLD AUTO: 0.6 X10(3)/MCL (ref 0–1.1)
MONOCYTES NFR BLD AUTO: 10 % (ref 4–12)
NEUTROPHILS # BLD AUTO: 3.8 X10(3)/MCL (ref 1.5–6.9)
NEUTROPHILS NFR BLD AUTO: 59 % (ref 43–75)
PLATELET # BLD AUTO: 221 X10(3)/MCL (ref 140–400)
PMV BLD AUTO: 9.8 FL (ref 6.8–10)
POTASSIUM SERPL-SCNC: 4.2 MMOL/L (ref 3.6–5.2)
PROT SERPL-MCNC: 7.5 GM/DL (ref 6.4–8.2)
RBC # BLD AUTO: 3.04 X10(6)/MCL (ref 4.2–5.4)
SODIUM SERPL-SCNC: 144 MMOL/L (ref 135–145)
WBC # SPEC AUTO: 6.4 X10(3)/MCL (ref 4.5–11.5)

## 2018-06-27 ENCOUNTER — HISTORICAL (OUTPATIENT)
Dept: LAB | Facility: HOSPITAL | Age: 48
End: 2018-06-27

## 2018-06-27 LAB
ABS NEUT (OLG): 5.2 X10(3)/MCL (ref 1.5–6.9)
ALBUMIN SERPL-MCNC: 3.7 GM/DL (ref 3.4–5)
ALBUMIN/GLOB SERPL: 1 RATIO
ALP SERPL-CCNC: 60 UNIT/L (ref 30–113)
ALT SERPL-CCNC: 21 UNIT/L (ref 10–45)
AST SERPL-CCNC: 20 UNIT/L (ref 15–37)
BASOPHILS # BLD AUTO: 0 X10(3)/MCL (ref 0–0.1)
BASOPHILS NFR BLD AUTO: 0 % (ref 0–1)
BILIRUB SERPL-MCNC: 0.2 MG/DL (ref 0.1–0.9)
BILIRUBIN DIRECT+TOT PNL SERPL-MCNC: 0.1 MG/DL
BILIRUBIN DIRECT+TOT PNL SERPL-MCNC: 0.1 MG/DL (ref 0–0.3)
BUN SERPL-MCNC: 11 MG/DL (ref 10–20)
CALCIUM SERPL-MCNC: 9.2 MG/DL (ref 8–10.5)
CHLORIDE SERPL-SCNC: 104 MMOL/L (ref 100–108)
CO2 SERPL-SCNC: 29 MMOL/L (ref 21–35)
CREAT SERPL-MCNC: 1.09 MG/DL (ref 0.7–1.3)
EOSINOPHIL # BLD AUTO: 0 X10(3)/MCL (ref 0–0.6)
EOSINOPHIL NFR BLD AUTO: 0 % (ref 0–5)
ERYTHROCYTE [DISTWIDTH] IN BLOOD BY AUTOMATED COUNT: 15.6 % (ref 11.5–17)
GLOBULIN SER-MCNC: 3.8 GM/DL
GLUCOSE SERPL-MCNC: 80 MG/DL (ref 75–116)
HCT VFR BLD AUTO: 31.6 % (ref 36–48)
HGB BLD-MCNC: 10 GM/DL (ref 12–16)
LDH SERPL-CCNC: 180 UNIT/L (ref 112–240)
LYMPHOCYTES # BLD AUTO: 1.7 X10(3)/MCL (ref 0.5–4.1)
LYMPHOCYTES NFR BLD AUTO: 22.1 % (ref 15–40)
MCH RBC QN AUTO: 32 PG (ref 27–34)
MCHC RBC AUTO-ENTMCNC: 32 GM/DL (ref 31–36)
MCV RBC AUTO: 100 FL (ref 80–99)
MONOCYTES # BLD AUTO: 0.7 X10(3)/MCL (ref 0–1.1)
MONOCYTES NFR BLD AUTO: 10 % (ref 4–12)
NEUTROPHILS # BLD AUTO: 5.2 X10(3)/MCL (ref 1.5–6.9)
NEUTROPHILS NFR BLD AUTO: 67 % (ref 43–75)
PLATELET # BLD AUTO: 456 X10(3)/MCL (ref 140–400)
PMV BLD AUTO: 9.2 FL (ref 6.8–10)
POTASSIUM SERPL-SCNC: 3.7 MMOL/L (ref 3.6–5.2)
PROT SERPL-MCNC: 7.5 GM/DL (ref 6.4–8.2)
RBC # BLD AUTO: 3.17 X10(6)/MCL (ref 4.2–5.4)
SODIUM SERPL-SCNC: 141 MMOL/L (ref 135–145)
WBC # SPEC AUTO: 7.7 X10(3)/MCL (ref 4.5–11.5)

## 2018-06-28 ENCOUNTER — HISTORICAL (OUTPATIENT)
Dept: INFUSION THERAPY | Facility: HOSPITAL | Age: 48
End: 2018-06-28

## 2018-06-29 ENCOUNTER — HISTORICAL (OUTPATIENT)
Dept: INFUSION THERAPY | Facility: HOSPITAL | Age: 48
End: 2018-06-29

## 2018-07-05 ENCOUNTER — HISTORICAL (OUTPATIENT)
Dept: LAB | Facility: HOSPITAL | Age: 48
End: 2018-07-05

## 2018-07-05 LAB
ABS NEUT (OLG): 0.1 X10(3)/MCL (ref 1.5–6.9)
ALBUMIN SERPL-MCNC: 3.6 GM/DL (ref 3.4–5)
ALBUMIN/GLOB SERPL: 1 RATIO
ALP SERPL-CCNC: 62 UNIT/L (ref 30–113)
ALT SERPL-CCNC: 18 UNIT/L (ref 10–45)
AST SERPL-CCNC: 12 UNIT/L (ref 15–37)
BASOPHILS NFR BLD MANUAL: 3 % (ref 0–1)
BILIRUB SERPL-MCNC: 0.6 MG/DL (ref 0.1–0.9)
BILIRUBIN DIRECT+TOT PNL SERPL-MCNC: 0.2 MG/DL (ref 0–0.3)
BILIRUBIN DIRECT+TOT PNL SERPL-MCNC: 0.4 MG/DL
BUN SERPL-MCNC: 17 MG/DL (ref 10–20)
CALCIUM SERPL-MCNC: 9.2 MG/DL (ref 8–10.5)
CHLORIDE SERPL-SCNC: 101 MMOL/L (ref 100–108)
CO2 SERPL-SCNC: 31 MMOL/L (ref 21–35)
CREAT SERPL-MCNC: 0.92 MG/DL (ref 0.7–1.3)
EOSINOPHIL NFR BLD MANUAL: 5 % (ref 0–5)
ERYTHROCYTE [DISTWIDTH] IN BLOOD BY AUTOMATED COUNT: 14.2 % (ref 11.5–17)
GLOBULIN SER-MCNC: 3.5 GM/DL
GLUCOSE SERPL-MCNC: 104 MG/DL (ref 75–116)
GRANULOCYTES NFR BLD MANUAL: 13 % (ref 47–80)
HCT VFR BLD AUTO: 26.5 % (ref 36–48)
HGB BLD-MCNC: 8.5 GM/DL (ref 12–16)
LDH SERPL-CCNC: 149 UNIT/L (ref 112–240)
LYMPHOCYTES NFR BLD MANUAL: 76 % (ref 15–40)
MCH RBC QN AUTO: 32 PG (ref 27–34)
MCHC RBC AUTO-ENTMCNC: 32 GM/DL (ref 31–36)
MCV RBC AUTO: 100 FL (ref 80–99)
MONOCYTES NFR BLD MANUAL: 3 % (ref 4–12)
PLATELET # BLD AUTO: 181 X10(3)/MCL (ref 140–400)
PLATELET # BLD EST: ADEQUATE 10*3/UL
PMV BLD AUTO: 10 FL (ref 6.8–10)
POTASSIUM SERPL-SCNC: 3.3 MMOL/L (ref 3.6–5.2)
PROT SERPL-MCNC: 7.1 GM/DL (ref 6.4–8.2)
RBC # BLD AUTO: 2.65 X10(6)/MCL (ref 4.2–5.4)
RBC MORPH BLD: NORMAL
SODIUM SERPL-SCNC: 139 MMOL/L (ref 135–145)
WBC # SPEC AUTO: 0.8 X10(3)/MCL (ref 4.5–11.5)

## 2018-07-11 ENCOUNTER — HISTORICAL (OUTPATIENT)
Dept: LAB | Facility: HOSPITAL | Age: 48
End: 2018-07-11

## 2018-07-11 LAB
ABS NEUT (OLG): 4.5 X10(3)/MCL (ref 1.5–6.9)
ALBUMIN SERPL-MCNC: 3.9 GM/DL (ref 3.4–5)
ALBUMIN/GLOB SERPL: 1 RATIO
ALP SERPL-CCNC: 64 UNIT/L (ref 30–113)
ALT SERPL-CCNC: 21 UNIT/L (ref 10–45)
AST SERPL-CCNC: 18 UNIT/L (ref 15–37)
BASOPHILS NFR BLD MANUAL: 0 % (ref 0–1)
BILIRUB SERPL-MCNC: 0.2 MG/DL (ref 0.1–0.9)
BILIRUBIN DIRECT+TOT PNL SERPL-MCNC: 0.1 MG/DL
BILIRUBIN DIRECT+TOT PNL SERPL-MCNC: 0.1 MG/DL (ref 0–0.3)
BUN SERPL-MCNC: 17 MG/DL (ref 10–20)
CALCIUM SERPL-MCNC: 9.1 MG/DL (ref 8–10.5)
CHLORIDE SERPL-SCNC: 102 MMOL/L (ref 100–108)
CO2 SERPL-SCNC: 27 MMOL/L (ref 21–35)
CREAT SERPL-MCNC: 0.97 MG/DL (ref 0.7–1.3)
EOSINOPHIL NFR BLD MANUAL: 1 % (ref 0–5)
ERYTHROCYTE [DISTWIDTH] IN BLOOD BY AUTOMATED COUNT: 15.1 % (ref 11.5–17)
GLOBULIN SER-MCNC: 3.9 GM/DL
GLUCOSE SERPL-MCNC: 83 MG/DL (ref 75–116)
GRANULOCYTES NFR BLD MANUAL: 58 % (ref 47–80)
HCT VFR BLD AUTO: 29.8 % (ref 36–48)
HGB BLD-MCNC: 9.6 GM/DL (ref 12–16)
LYMPHOCYTES NFR BLD MANUAL: 30 % (ref 15–40)
MCH RBC QN AUTO: 32 PG (ref 27–34)
MCHC RBC AUTO-ENTMCNC: 32 GM/DL (ref 31–36)
MCV RBC AUTO: 100 FL (ref 80–99)
METAMYELOCYTES NFR BLD MANUAL: 1 %
MONOCYTES NFR BLD MANUAL: 10 % (ref 4–12)
PLATELET # BLD AUTO: 246 X10(3)/MCL (ref 140–400)
PLATELET # BLD EST: ADEQUATE 10*3/UL
PMV BLD AUTO: 10.3 FL (ref 6.8–10)
POTASSIUM SERPL-SCNC: 3.4 MMOL/L (ref 3.6–5.2)
PROT SERPL-MCNC: 7.8 GM/DL (ref 6.4–8.2)
RBC # BLD AUTO: 2.97 X10(6)/MCL (ref 4.2–5.4)
RBC MORPH BLD: NORMAL
SODIUM SERPL-SCNC: 140 MMOL/L (ref 135–145)
WBC # SPEC AUTO: 7.3 X10(3)/MCL (ref 4.5–11.5)

## 2018-07-18 ENCOUNTER — HISTORICAL (OUTPATIENT)
Dept: LAB | Facility: HOSPITAL | Age: 48
End: 2018-07-18

## 2018-07-18 LAB
ABS NEUT (OLG): 2.6 X10(3)/MCL (ref 1.5–6.9)
ALBUMIN SERPL-MCNC: 3.4 GM/DL (ref 3.4–5)
ALBUMIN/GLOB SERPL: 1 RATIO
ALP SERPL-CCNC: 53 UNIT/L (ref 30–113)
ALT SERPL-CCNC: 21 UNIT/L (ref 10–45)
AST SERPL-CCNC: 20 UNIT/L (ref 15–37)
BASOPHILS # BLD AUTO: 0 X10(3)/MCL (ref 0–0.1)
BASOPHILS NFR BLD AUTO: 1 % (ref 0–1)
BILIRUB SERPL-MCNC: 0.3 MG/DL (ref 0.1–0.9)
BILIRUBIN DIRECT+TOT PNL SERPL-MCNC: 0.1 MG/DL (ref 0–0.3)
BILIRUBIN DIRECT+TOT PNL SERPL-MCNC: 0.2 MG/DL
BUN SERPL-MCNC: 14 MG/DL (ref 10–20)
CALCIUM SERPL-MCNC: 9.2 MG/DL (ref 8–10.5)
CHLORIDE SERPL-SCNC: 105 MMOL/L (ref 100–108)
CO2 SERPL-SCNC: 30 MMOL/L (ref 21–35)
CREAT SERPL-MCNC: 0.93 MG/DL (ref 0.7–1.3)
EOSINOPHIL # BLD AUTO: 0 X10(3)/MCL (ref 0–0.6)
EOSINOPHIL NFR BLD AUTO: 0 % (ref 0–5)
ERYTHROCYTE [DISTWIDTH] IN BLOOD BY AUTOMATED COUNT: 15.6 % (ref 11.5–17)
GLOBULIN SER-MCNC: 3.5 GM/DL
GLUCOSE SERPL-MCNC: 85 MG/DL (ref 75–116)
HCT VFR BLD AUTO: 27.5 % (ref 36–48)
HGB BLD-MCNC: 8.8 GM/DL (ref 12–16)
LDH SERPL-CCNC: 177 UNIT/L (ref 112–240)
LYMPHOCYTES # BLD AUTO: 1 X10(3)/MCL (ref 0.5–4.1)
LYMPHOCYTES NFR BLD AUTO: 22.9 % (ref 15–40)
MCH RBC QN AUTO: 33 PG (ref 27–34)
MCHC RBC AUTO-ENTMCNC: 32 GM/DL (ref 31–36)
MCV RBC AUTO: 103 FL (ref 80–99)
MONOCYTES # BLD AUTO: 0.6 X10(3)/MCL (ref 0–1.1)
MONOCYTES NFR BLD AUTO: 14 % (ref 4–12)
NEUTROPHILS # BLD AUTO: 2.6 X10(3)/MCL (ref 1.5–6.9)
NEUTROPHILS NFR BLD AUTO: 61 % (ref 43–75)
PLATELET # BLD AUTO: 409 X10(3)/MCL (ref 140–400)
PMV BLD AUTO: 9 FL (ref 6.8–10)
POTASSIUM SERPL-SCNC: 3.6 MMOL/L (ref 3.6–5.2)
PROT SERPL-MCNC: 6.9 GM/DL (ref 6.4–8.2)
RBC # BLD AUTO: 2.67 X10(6)/MCL (ref 4.2–5.4)
SODIUM SERPL-SCNC: 142 MMOL/L (ref 135–145)
WBC # SPEC AUTO: 4.2 X10(3)/MCL (ref 4.5–11.5)

## 2018-07-19 ENCOUNTER — HISTORICAL (OUTPATIENT)
Dept: INFUSION THERAPY | Facility: HOSPITAL | Age: 48
End: 2018-07-19

## 2018-07-20 ENCOUNTER — HISTORICAL (OUTPATIENT)
Dept: INFUSION THERAPY | Facility: HOSPITAL | Age: 48
End: 2018-07-20

## 2018-07-25 ENCOUNTER — HISTORICAL (OUTPATIENT)
Dept: LAB | Facility: HOSPITAL | Age: 48
End: 2018-07-25

## 2018-07-25 LAB
ABS NEUT (OLG): 0.4 X10(3)/MCL (ref 1.5–6.9)
ALBUMIN SERPL-MCNC: 3.5 GM/DL (ref 3.4–5)
ALBUMIN/GLOB SERPL: 1.1 RATIO
ALP SERPL-CCNC: 56 UNIT/L (ref 30–113)
ALT SERPL-CCNC: 19 UNIT/L (ref 10–45)
AST SERPL-CCNC: 11 UNIT/L (ref 15–37)
BASOPHILS NFR BLD MANUAL: 1 % (ref 0–1)
BILIRUB SERPL-MCNC: 0.7 MG/DL (ref 0.1–0.9)
BILIRUBIN DIRECT+TOT PNL SERPL-MCNC: 0.2 MG/DL (ref 0–0.3)
BILIRUBIN DIRECT+TOT PNL SERPL-MCNC: 0.5 MG/DL
BUN SERPL-MCNC: 15 MG/DL (ref 10–20)
CALCIUM SERPL-MCNC: 8.7 MG/DL (ref 8–10.5)
CHLORIDE SERPL-SCNC: 103 MMOL/L (ref 100–108)
CO2 SERPL-SCNC: 31 MMOL/L (ref 21–35)
CREAT SERPL-MCNC: 0.92 MG/DL (ref 0.7–1.3)
CROSSMATCH INTERPRETATION: NORMAL
EOSINOPHIL NFR BLD MANUAL: 2 % (ref 0–5)
ERYTHROCYTE [DISTWIDTH] IN BLOOD BY AUTOMATED COUNT: 13.5 % (ref 11.5–17)
GLOBULIN SER-MCNC: 3.2 GM/DL
GLUCOSE SERPL-MCNC: 95 MG/DL (ref 75–116)
GRANULOCYTES NFR BLD MANUAL: 45 % (ref 47–80)
GROUP & RH: NORMAL
HCT VFR BLD AUTO: 23.2 % (ref 36–48)
HGB BLD-MCNC: 7.5 GM/DL (ref 12–16)
LDH SERPL-CCNC: 153 UNIT/L (ref 112–240)
LYMPHOCYTES NFR BLD MANUAL: 51 % (ref 15–40)
MCH RBC QN AUTO: 34 PG (ref 27–34)
MCHC RBC AUTO-ENTMCNC: 32 GM/DL (ref 31–36)
MCV RBC AUTO: 104 FL (ref 80–99)
MONOCYTES NFR BLD MANUAL: 1 % (ref 4–12)
PLATELET # BLD AUTO: 160 X10(3)/MCL (ref 140–400)
PLATELET # BLD EST: ADEQUATE 10*3/UL
PMV BLD AUTO: 10.5 FL (ref 6.8–10)
POTASSIUM SERPL-SCNC: 4.2 MMOL/L (ref 3.6–5.2)
PRODUCT READY: NORMAL
PROT SERPL-MCNC: 6.7 GM/DL (ref 6.4–8.2)
RBC # BLD AUTO: 2.23 X10(6)/MCL (ref 4.2–5.4)
RBC MORPH BLD: NORMAL
SODIUM SERPL-SCNC: 139 MMOL/L (ref 135–145)
TRANSFUSION ORDER: NORMAL
WBC # SPEC AUTO: 0.8 X10(3)/MCL (ref 4.5–11.5)

## 2018-07-26 ENCOUNTER — HISTORICAL (OUTPATIENT)
Dept: INFUSION THERAPY | Facility: HOSPITAL | Age: 48
End: 2018-07-26

## 2018-08-01 ENCOUNTER — HISTORICAL (OUTPATIENT)
Dept: LAB | Facility: HOSPITAL | Age: 48
End: 2018-08-01

## 2018-08-01 LAB
ABS NEUT (OLG): 4.8 X10(3)/MCL (ref 1.5–6.9)
ALBUMIN SERPL-MCNC: 3.7 GM/DL (ref 3.4–5)
ALBUMIN/GLOB SERPL: 0.9 RATIO
ALP SERPL-CCNC: 62 UNIT/L (ref 30–113)
ALT SERPL-CCNC: 17 UNIT/L (ref 10–45)
AST SERPL-CCNC: 15 UNIT/L (ref 15–37)
BASOPHILS NFR BLD MANUAL: 0 % (ref 0–1)
BILIRUB SERPL-MCNC: 0.2 MG/DL (ref 0.1–0.9)
BILIRUBIN DIRECT+TOT PNL SERPL-MCNC: 0.1 MG/DL
BILIRUBIN DIRECT+TOT PNL SERPL-MCNC: 0.1 MG/DL (ref 0–0.3)
BUN SERPL-MCNC: 15 MG/DL (ref 10–20)
CALCIUM SERPL-MCNC: 9.3 MG/DL (ref 8–10.5)
CHLORIDE SERPL-SCNC: 102 MMOL/L (ref 100–108)
CO2 SERPL-SCNC: 29 MMOL/L (ref 21–35)
CREAT SERPL-MCNC: 1.09 MG/DL (ref 0.7–1.3)
EOSINOPHIL NFR BLD MANUAL: 1 % (ref 0–5)
ERYTHROCYTE [DISTWIDTH] IN BLOOD BY AUTOMATED COUNT: 14.9 % (ref 11.5–17)
GLOBULIN SER-MCNC: 3.9 GM/DL
GLUCOSE SERPL-MCNC: 142 MG/DL (ref 75–116)
GRANULOCYTES NFR BLD MANUAL: 66 % (ref 47–80)
HCT VFR BLD AUTO: 35.3 % (ref 36–48)
HGB BLD-MCNC: 11.3 GM/DL (ref 12–16)
LDH SERPL-CCNC: 207 UNIT/L (ref 112–240)
LYMPHOCYTES NFR BLD MANUAL: 13 % (ref 15–40)
MCH RBC QN AUTO: 32 PG (ref 27–34)
MCHC RBC AUTO-ENTMCNC: 32 GM/DL (ref 31–36)
MCV RBC AUTO: 100 FL (ref 80–99)
METAMYELOCYTES NFR BLD MANUAL: 2 %
MONOCYTES NFR BLD MANUAL: 10 % (ref 4–12)
MYELOCYTES NFR BLD MANUAL: 1 %
NEUTS BAND NFR BLD MANUAL: 7 % (ref 1–5)
PLATELET # BLD AUTO: 197 X10(3)/MCL (ref 140–400)
PLATELET # BLD EST: ADEQUATE 10*3/UL
PMV BLD AUTO: 10 FL (ref 6.8–10)
POTASSIUM SERPL-SCNC: 4.5 MMOL/L (ref 3.6–5.2)
PROT SERPL-MCNC: 7.6 GM/DL (ref 6.4–8.2)
RBC # BLD AUTO: 3.52 X10(6)/MCL (ref 4.2–5.4)
RBC MORPH BLD: NORMAL
SODIUM SERPL-SCNC: 138 MMOL/L (ref 135–145)
WBC # SPEC AUTO: 7.4 X10(3)/MCL (ref 4.5–11.5)

## 2018-08-08 ENCOUNTER — HISTORICAL (OUTPATIENT)
Dept: LAB | Facility: HOSPITAL | Age: 48
End: 2018-08-08

## 2018-08-08 LAB
ABS NEUT (OLG): 3.4 X10(3)/MCL (ref 1.5–6.9)
ALBUMIN SERPL-MCNC: 3.7 GM/DL (ref 3.4–5)
ALBUMIN/GLOB SERPL: 1 RATIO
ALP SERPL-CCNC: 54 UNIT/L (ref 30–113)
ALT SERPL-CCNC: 21 UNIT/L (ref 10–45)
AST SERPL-CCNC: 17 UNIT/L (ref 15–37)
BASOPHILS # BLD AUTO: 0 X10(3)/MCL (ref 0–0.1)
BASOPHILS NFR BLD AUTO: 1 % (ref 0–1)
BILIRUB SERPL-MCNC: 0.3 MG/DL (ref 0.1–0.9)
BILIRUBIN DIRECT+TOT PNL SERPL-MCNC: 0.1 MG/DL (ref 0–0.3)
BILIRUBIN DIRECT+TOT PNL SERPL-MCNC: 0.2 MG/DL
BUN SERPL-MCNC: 15 MG/DL (ref 10–20)
CALCIUM SERPL-MCNC: 9.3 MG/DL (ref 8–10.5)
CHLORIDE SERPL-SCNC: 106 MMOL/L (ref 100–108)
CO2 SERPL-SCNC: 29 MMOL/L (ref 21–35)
CREAT SERPL-MCNC: 0.85 MG/DL (ref 0.7–1.3)
EOSINOPHIL # BLD AUTO: 0 X10(3)/MCL (ref 0–0.6)
EOSINOPHIL NFR BLD AUTO: 0 % (ref 0–5)
ERYTHROCYTE [DISTWIDTH] IN BLOOD BY AUTOMATED COUNT: 14.7 % (ref 11.5–17)
GLOBULIN SER-MCNC: 3.6 GM/DL
GLUCOSE SERPL-MCNC: 100 MG/DL (ref 75–116)
HCT VFR BLD AUTO: 34.3 % (ref 36–48)
HGB BLD-MCNC: 11 GM/DL (ref 12–16)
LDH SERPL-CCNC: 175 UNIT/L (ref 112–240)
LYMPHOCYTES # BLD AUTO: 1.1 X10(3)/MCL (ref 0.5–4.1)
LYMPHOCYTES NFR BLD AUTO: 21.9 % (ref 15–40)
MCH RBC QN AUTO: 32 PG (ref 27–34)
MCHC RBC AUTO-ENTMCNC: 32 GM/DL (ref 31–36)
MCV RBC AUTO: 99 FL (ref 80–99)
MONOCYTES # BLD AUTO: 0.6 X10(3)/MCL (ref 0–1.1)
MONOCYTES NFR BLD AUTO: 11 % (ref 4–12)
NEUTROPHILS # BLD AUTO: 3.4 X10(3)/MCL (ref 1.5–6.9)
NEUTROPHILS NFR BLD AUTO: 66 % (ref 43–75)
PLATELET # BLD AUTO: 411 X10(3)/MCL (ref 140–400)
PMV BLD AUTO: 9.5 FL (ref 6.8–10)
POTASSIUM SERPL-SCNC: 4.1 MMOL/L (ref 3.6–5.2)
PROT SERPL-MCNC: 7.3 GM/DL (ref 6.4–8.2)
RBC # BLD AUTO: 3.45 X10(6)/MCL (ref 4.2–5.4)
SODIUM SERPL-SCNC: 145 MMOL/L (ref 135–145)
WBC # SPEC AUTO: 5.1 X10(3)/MCL (ref 4.5–11.5)

## 2018-08-09 ENCOUNTER — HISTORICAL (OUTPATIENT)
Dept: INFUSION THERAPY | Facility: HOSPITAL | Age: 48
End: 2018-08-09

## 2018-08-10 ENCOUNTER — HISTORICAL (OUTPATIENT)
Dept: INFUSION THERAPY | Facility: HOSPITAL | Age: 48
End: 2018-08-10

## 2018-08-15 ENCOUNTER — HISTORICAL (OUTPATIENT)
Dept: LAB | Facility: HOSPITAL | Age: 48
End: 2018-08-15

## 2018-08-15 LAB
ABS NEUT (OLG): 0.5 X10(3)/MCL (ref 1.5–6.9)
ALBUMIN SERPL-MCNC: 3.5 GM/DL (ref 3.4–5)
ALBUMIN/GLOB SERPL: 1.1 RATIO
ALP SERPL-CCNC: 63 UNIT/L (ref 30–113)
ALT SERPL-CCNC: 19 UNIT/L (ref 10–45)
AST SERPL-CCNC: 10 UNIT/L (ref 15–37)
BASOPHILS NFR BLD MANUAL: 1 % (ref 0–1)
BILIRUB SERPL-MCNC: 0.4 MG/DL (ref 0.1–0.9)
BILIRUBIN DIRECT+TOT PNL SERPL-MCNC: 0.1 MG/DL (ref 0–0.3)
BILIRUBIN DIRECT+TOT PNL SERPL-MCNC: 0.3 MG/DL
BUN SERPL-MCNC: 20 MG/DL (ref 10–20)
CALCIUM SERPL-MCNC: 9 MG/DL (ref 8–10.5)
CHLORIDE SERPL-SCNC: 103 MMOL/L (ref 100–108)
CO2 SERPL-SCNC: 34 MMOL/L (ref 21–35)
CREAT SERPL-MCNC: 0.86 MG/DL (ref 0.7–1.3)
EOSINOPHIL NFR BLD MANUAL: 3 % (ref 0–5)
ERYTHROCYTE [DISTWIDTH] IN BLOOD BY AUTOMATED COUNT: 14.1 % (ref 11.5–17)
GLOBULIN SER-MCNC: 3.2 GM/DL
GLUCOSE SERPL-MCNC: 84 MG/DL (ref 75–116)
GRANULOCYTES NFR BLD MANUAL: 48 % (ref 47–80)
HCT VFR BLD AUTO: 31.5 % (ref 36–48)
HGB BLD-MCNC: 10 GM/DL (ref 12–16)
LDH SERPL-CCNC: 162 UNIT/L (ref 112–240)
LYMPHOCYTES NFR BLD MANUAL: 45 % (ref 15–40)
MCH RBC QN AUTO: 32 PG (ref 27–34)
MCHC RBC AUTO-ENTMCNC: 32 GM/DL (ref 31–36)
MCV RBC AUTO: 100 FL (ref 80–99)
MONOCYTES NFR BLD MANUAL: 2 % (ref 4–12)
NEUTS BAND NFR BLD MANUAL: 1 % (ref 1–5)
PLATELET # BLD AUTO: 114 X10(3)/MCL (ref 140–400)
PLATELET # BLD EST: ABNORMAL 10*3/UL
PMV BLD AUTO: 11.4 FL (ref 6.8–10)
POTASSIUM SERPL-SCNC: 3.7 MMOL/L (ref 3.6–5.2)
PROT SERPL-MCNC: 6.7 GM/DL (ref 6.4–8.2)
RBC # BLD AUTO: 3.16 X10(6)/MCL (ref 4.2–5.4)
RBC MORPH BLD: NORMAL
SODIUM SERPL-SCNC: 142 MMOL/L (ref 135–145)
WBC # SPEC AUTO: 1 X10(3)/MCL (ref 4.5–11.5)

## 2018-08-22 ENCOUNTER — HISTORICAL (OUTPATIENT)
Dept: LAB | Facility: HOSPITAL | Age: 48
End: 2018-08-22

## 2018-08-22 LAB
ABS NEUT (OLG): 5.5 X10(3)/MCL (ref 1.5–6.9)
ALBUMIN SERPL-MCNC: 3.6 GM/DL (ref 3.4–5)
ALBUMIN/GLOB SERPL: 1 RATIO
ALP SERPL-CCNC: 66 UNIT/L (ref 30–113)
ALT SERPL-CCNC: 24 UNIT/L (ref 10–45)
ANISOCYTOSIS BLD QL SMEAR: ABNORMAL
AST SERPL-CCNC: 20 UNIT/L (ref 15–37)
BASOPHILS NFR BLD MANUAL: 0 % (ref 0–1)
BILIRUB SERPL-MCNC: 0.2 MG/DL (ref 0.1–0.9)
BILIRUBIN DIRECT+TOT PNL SERPL-MCNC: 0.1 MG/DL
BILIRUBIN DIRECT+TOT PNL SERPL-MCNC: 0.1 MG/DL (ref 0–0.3)
BUN SERPL-MCNC: 12 MG/DL (ref 10–20)
CALCIUM SERPL-MCNC: 8.5 MG/DL (ref 8–10.5)
CHLORIDE SERPL-SCNC: 103 MMOL/L (ref 100–108)
CO2 SERPL-SCNC: 28 MMOL/L (ref 21–35)
CREAT SERPL-MCNC: 1.07 MG/DL (ref 0.7–1.3)
EOSINOPHIL NFR BLD MANUAL: 2 % (ref 0–5)
ERYTHROCYTE [DISTWIDTH] IN BLOOD BY AUTOMATED COUNT: 14.5 % (ref 11.5–17)
GLOBULIN SER-MCNC: 3.7 GM/DL
GLUCOSE SERPL-MCNC: 88 MG/DL (ref 75–116)
GRANULOCYTES NFR BLD MANUAL: 72 % (ref 47–80)
HCT VFR BLD AUTO: 31.2 % (ref 36–48)
HGB BLD-MCNC: 10.2 GM/DL (ref 12–16)
LDH SERPL-CCNC: 235 UNIT/L (ref 112–240)
LYMPHOCYTES NFR BLD MANUAL: 19 % (ref 15–40)
MCH RBC QN AUTO: 32 PG (ref 27–34)
MCHC RBC AUTO-ENTMCNC: 33 GM/DL (ref 31–36)
MCV RBC AUTO: 99 FL (ref 80–99)
MONOCYTES NFR BLD MANUAL: 6 % (ref 4–12)
NEUTS BAND NFR BLD MANUAL: 1 % (ref 1–5)
PLATELET # BLD AUTO: 192 X10(3)/MCL (ref 140–400)
PLATELET # BLD EST: ADEQUATE 10*3/UL
PMV BLD AUTO: 10.1 FL (ref 6.8–10)
POLYCHROMASIA BLD QL SMEAR: ABNORMAL
POTASSIUM SERPL-SCNC: 3.7 MMOL/L (ref 3.6–5.2)
PROT SERPL-MCNC: 7.3 GM/DL (ref 6.4–8.2)
RBC # BLD AUTO: 3.16 X10(6)/MCL (ref 4.2–5.4)
RBC MORPH BLD: ABNORMAL
SODIUM SERPL-SCNC: 138 MMOL/L (ref 135–145)
WBC # SPEC AUTO: 7.7 X10(3)/MCL (ref 4.5–11.5)

## 2018-08-28 ENCOUNTER — HISTORICAL (OUTPATIENT)
Dept: RADIOLOGY | Facility: HOSPITAL | Age: 48
End: 2018-08-28

## 2018-08-29 ENCOUNTER — HISTORICAL (OUTPATIENT)
Dept: LAB | Facility: HOSPITAL | Age: 48
End: 2018-08-29

## 2018-08-29 LAB
ABS NEUT (OLG): 3.4 X10(3)/MCL (ref 1.5–6.9)
ALBUMIN SERPL-MCNC: 3.8 GM/DL (ref 3.4–5)
ALBUMIN/GLOB SERPL: 0.9 RATIO
ALP SERPL-CCNC: 62 UNIT/L (ref 30–113)
ALT SERPL-CCNC: 26 UNIT/L (ref 10–45)
AST SERPL-CCNC: 22 UNIT/L (ref 15–37)
BASOPHILS # BLD AUTO: 0 X10(3)/MCL (ref 0–0.1)
BASOPHILS NFR BLD AUTO: 1 % (ref 0–1)
BILIRUB SERPL-MCNC: 0.3 MG/DL (ref 0.1–0.9)
BILIRUBIN DIRECT+TOT PNL SERPL-MCNC: 0.1 MG/DL (ref 0–0.3)
BILIRUBIN DIRECT+TOT PNL SERPL-MCNC: 0.2 MG/DL
BUN SERPL-MCNC: 10 MG/DL (ref 10–20)
CALCIUM SERPL-MCNC: 9.7 MG/DL (ref 8–10.5)
CHLORIDE SERPL-SCNC: 104 MMOL/L (ref 100–108)
CO2 SERPL-SCNC: 26 MMOL/L (ref 21–35)
CREAT SERPL-MCNC: 0.97 MG/DL (ref 0.7–1.3)
EOSINOPHIL # BLD AUTO: 0 X10(3)/MCL (ref 0–0.6)
EOSINOPHIL NFR BLD AUTO: 0 % (ref 0–5)
ERYTHROCYTE [DISTWIDTH] IN BLOOD BY AUTOMATED COUNT: 15.1 % (ref 11.5–17)
GLOBULIN SER-MCNC: 4.1 GM/DL
GLUCOSE SERPL-MCNC: 103 MG/DL (ref 75–116)
HCT VFR BLD AUTO: 33.1 % (ref 36–48)
HGB BLD-MCNC: 10.7 GM/DL (ref 12–16)
LDH SERPL-CCNC: 190 UNIT/L (ref 112–240)
LYMPHOCYTES # BLD AUTO: 1 X10(3)/MCL (ref 0.5–4.1)
LYMPHOCYTES NFR BLD AUTO: 20 % (ref 15–40)
MCH RBC QN AUTO: 32 PG (ref 27–34)
MCHC RBC AUTO-ENTMCNC: 32 GM/DL (ref 31–36)
MCV RBC AUTO: 100 FL (ref 80–99)
MONOCYTES # BLD AUTO: 0.6 X10(3)/MCL (ref 0–1.1)
MONOCYTES NFR BLD AUTO: 11 % (ref 4–12)
NEUTROPHILS # BLD AUTO: 3.4 X10(3)/MCL (ref 1.5–6.9)
NEUTROPHILS NFR BLD AUTO: 68 % (ref 43–75)
PLATELET # BLD AUTO: 412 X10(3)/MCL (ref 140–400)
PMV BLD AUTO: 9.1 FL (ref 6.8–10)
POTASSIUM SERPL-SCNC: 4.4 MMOL/L (ref 3.6–5.2)
PROT SERPL-MCNC: 7.9 GM/DL (ref 6.4–8.2)
RBC # BLD AUTO: 3.31 X10(6)/MCL (ref 4.2–5.4)
SODIUM SERPL-SCNC: 142 MMOL/L (ref 135–145)
WBC # SPEC AUTO: 5 X10(3)/MCL (ref 4.5–11.5)

## 2018-09-12 ENCOUNTER — HISTORICAL (OUTPATIENT)
Dept: LAB | Facility: HOSPITAL | Age: 48
End: 2018-09-12

## 2018-09-12 LAB
ABS NEUT (OLG): 3.6 X10(3)/MCL (ref 1.5–6.9)
ALBUMIN SERPL-MCNC: 3.4 GM/DL (ref 3.4–5)
ALBUMIN/GLOB SERPL: 0.9 RATIO
ALP SERPL-CCNC: 69 UNIT/L (ref 30–113)
ALT SERPL-CCNC: 23 UNIT/L (ref 10–45)
AST SERPL-CCNC: 15 UNIT/L (ref 15–37)
BASOPHILS # BLD AUTO: 0 X10(3)/MCL (ref 0–0.1)
BASOPHILS NFR BLD AUTO: 1 % (ref 0–1)
BILIRUB SERPL-MCNC: 0.2 MG/DL (ref 0.1–0.9)
BILIRUBIN DIRECT+TOT PNL SERPL-MCNC: <0.05 MG/DL (ref 0–0.3)
BILIRUBIN DIRECT+TOT PNL SERPL-MCNC: >0.15 MG/DL
BUN SERPL-MCNC: 14 MG/DL (ref 10–20)
CALCIUM SERPL-MCNC: 8.6 MG/DL (ref 8–10.5)
CHLORIDE SERPL-SCNC: 105 MMOL/L (ref 100–108)
CO2 SERPL-SCNC: 26 MMOL/L (ref 21–35)
CREAT SERPL-MCNC: 0.9 MG/DL (ref 0.7–1.3)
EOSINOPHIL # BLD AUTO: 0.5 X10(3)/MCL (ref 0–0.6)
EOSINOPHIL NFR BLD AUTO: 9 % (ref 0–5)
ERYTHROCYTE [DISTWIDTH] IN BLOOD BY AUTOMATED COUNT: 14.1 % (ref 11.5–17)
GLOBULIN SER-MCNC: 3.7 GM/DL
GLUCOSE SERPL-MCNC: 83 MG/DL (ref 75–116)
HCT VFR BLD AUTO: 33.7 % (ref 36–48)
HGB BLD-MCNC: 10.6 GM/DL (ref 12–16)
LDH SERPL-CCNC: 177 UNIT/L (ref 112–240)
LYMPHOCYTES # BLD AUTO: 0.9 X10(3)/MCL (ref 0.5–4.1)
LYMPHOCYTES NFR BLD AUTO: 16.4 % (ref 15–40)
MCH RBC QN AUTO: 32 PG (ref 27–34)
MCHC RBC AUTO-ENTMCNC: 32 GM/DL (ref 31–36)
MCV RBC AUTO: 102 FL (ref 80–99)
MONOCYTES # BLD AUTO: 0.6 X10(3)/MCL (ref 0–1.1)
MONOCYTES NFR BLD AUTO: 10 % (ref 4–12)
NEUTROPHILS # BLD AUTO: 3.6 X10(3)/MCL (ref 1.5–6.9)
NEUTROPHILS NFR BLD AUTO: 63 % (ref 43–75)
PLATELET # BLD AUTO: 259 X10(3)/MCL (ref 140–400)
PMV BLD AUTO: 9.4 FL (ref 6.8–10)
POTASSIUM SERPL-SCNC: 4.4 MMOL/L (ref 3.6–5.2)
PROT SERPL-MCNC: 7.1 GM/DL (ref 6.4–8.2)
RBC # BLD AUTO: 3.3 X10(6)/MCL (ref 4.2–5.4)
SODIUM SERPL-SCNC: 141 MMOL/L (ref 135–145)
WBC # SPEC AUTO: 5.7 X10(3)/MCL (ref 4.5–11.5)

## 2018-09-14 ENCOUNTER — HISTORICAL (OUTPATIENT)
Dept: RADIOLOGY | Facility: HOSPITAL | Age: 48
End: 2018-09-14

## 2018-09-20 ENCOUNTER — HISTORICAL (OUTPATIENT)
Dept: RADIATION THERAPY | Facility: HOSPITAL | Age: 48
End: 2018-09-20

## 2018-10-02 ENCOUNTER — HISTORICAL (OUTPATIENT)
Dept: RADIATION THERAPY | Facility: HOSPITAL | Age: 48
End: 2018-10-02

## 2018-10-29 ENCOUNTER — HISTORICAL (OUTPATIENT)
Dept: LAB | Facility: HOSPITAL | Age: 48
End: 2018-10-29

## 2018-10-29 LAB
ABS NEUT (OLG): 2.3 X10(3)/MCL (ref 1.5–6.9)
ALBUMIN SERPL-MCNC: 3.5 GM/DL (ref 3.4–5)
ALBUMIN/GLOB SERPL: 0.9 RATIO
ALP SERPL-CCNC: 67 UNIT/L (ref 30–113)
ALT SERPL-CCNC: 32 UNIT/L (ref 10–45)
AST SERPL-CCNC: 21 UNIT/L (ref 15–37)
BASOPHILS # BLD AUTO: 0 X10(3)/MCL (ref 0–0.1)
BASOPHILS NFR BLD AUTO: 1 % (ref 0–1)
BILIRUB SERPL-MCNC: 0.2 MG/DL (ref 0.1–0.9)
BILIRUBIN DIRECT+TOT PNL SERPL-MCNC: 0.1 MG/DL
BILIRUBIN DIRECT+TOT PNL SERPL-MCNC: 0.1 MG/DL (ref 0–0.3)
BUN SERPL-MCNC: 13 MG/DL (ref 10–20)
CALCIUM SERPL-MCNC: 9.5 MG/DL (ref 8–10.5)
CHLORIDE SERPL-SCNC: 102 MMOL/L (ref 100–108)
CO2 SERPL-SCNC: 30 MMOL/L (ref 21–35)
CREAT SERPL-MCNC: 1.18 MG/DL (ref 0.7–1.3)
EOSINOPHIL # BLD AUTO: 0.3 X10(3)/MCL (ref 0–0.6)
EOSINOPHIL NFR BLD AUTO: 6 % (ref 0–5)
ERYTHROCYTE [DISTWIDTH] IN BLOOD BY AUTOMATED COUNT: 11.6 % (ref 11.5–17)
GLOBULIN SER-MCNC: 3.8 GM/DL
GLUCOSE SERPL-MCNC: 80 MG/DL (ref 75–116)
HCT VFR BLD AUTO: 38 % (ref 36–48)
HGB BLD-MCNC: 12 GM/DL (ref 12–16)
IMM GRANULOCYTES # BLD AUTO: 0.01 10*3/UL (ref 0–0.02)
IMM GRANULOCYTES NFR BLD AUTO: 0.2 % (ref 0–0.43)
LDH SERPL-CCNC: 165 UNIT/L (ref 112–240)
LYMPHOCYTES # BLD AUTO: 1.3 X10(3)/MCL (ref 0.5–4.1)
LYMPHOCYTES NFR BLD AUTO: 30 % (ref 15–40)
MCH RBC QN AUTO: 31 PG (ref 27–34)
MCHC RBC AUTO-ENTMCNC: 32 GM/DL (ref 31–36)
MCV RBC AUTO: 98 FL (ref 80–99)
MONOCYTES # BLD AUTO: 0.4 X10(3)/MCL (ref 0–1.1)
MONOCYTES NFR BLD AUTO: 8 % (ref 4–12)
NEUTROPHILS # BLD AUTO: 2.3 X10(3)/MCL (ref 1.5–6.9)
NEUTROPHILS NFR BLD AUTO: 54 % (ref 43–75)
PLATELET # BLD AUTO: 263 X10(3)/MCL (ref 140–400)
PMV BLD AUTO: 9.5 FL (ref 6.8–10)
POTASSIUM SERPL-SCNC: 4.7 MMOL/L (ref 3.6–5.2)
PROT SERPL-MCNC: 7.3 GM/DL (ref 6.4–8.2)
RBC # BLD AUTO: 3.86 X10(6)/MCL (ref 4.2–5.4)
SODIUM SERPL-SCNC: 139 MMOL/L (ref 135–145)
WBC # SPEC AUTO: 4.2 X10(3)/MCL (ref 4.5–11.5)

## 2018-10-31 ENCOUNTER — HISTORICAL (OUTPATIENT)
Dept: INFUSION THERAPY | Facility: HOSPITAL | Age: 48
End: 2018-10-31

## 2018-11-01 ENCOUNTER — HISTORICAL (OUTPATIENT)
Dept: RADIOLOGY | Facility: HOSPITAL | Age: 48
End: 2018-11-01

## 2018-11-05 ENCOUNTER — HISTORICAL (OUTPATIENT)
Dept: RADIATION THERAPY | Facility: HOSPITAL | Age: 48
End: 2018-11-05

## 2018-11-15 ENCOUNTER — HISTORICAL (OUTPATIENT)
Dept: RADIATION THERAPY | Facility: HOSPITAL | Age: 48
End: 2018-11-15

## 2018-11-19 ENCOUNTER — HISTORICAL (OUTPATIENT)
Dept: RADIATION THERAPY | Facility: HOSPITAL | Age: 48
End: 2018-11-19

## 2018-11-20 ENCOUNTER — HISTORICAL (OUTPATIENT)
Dept: RADIATION THERAPY | Facility: HOSPITAL | Age: 48
End: 2018-11-20

## 2018-11-21 ENCOUNTER — HISTORICAL (OUTPATIENT)
Dept: RADIATION THERAPY | Facility: HOSPITAL | Age: 48
End: 2018-11-21

## 2018-11-26 ENCOUNTER — HISTORICAL (OUTPATIENT)
Dept: RADIATION THERAPY | Facility: HOSPITAL | Age: 48
End: 2018-11-26

## 2018-11-27 ENCOUNTER — HISTORICAL (OUTPATIENT)
Dept: RADIATION THERAPY | Facility: HOSPITAL | Age: 48
End: 2018-11-27

## 2018-11-28 ENCOUNTER — HISTORICAL (OUTPATIENT)
Dept: RADIATION THERAPY | Facility: HOSPITAL | Age: 48
End: 2018-11-28

## 2018-11-29 ENCOUNTER — HISTORICAL (OUTPATIENT)
Dept: RADIATION THERAPY | Facility: HOSPITAL | Age: 48
End: 2018-11-29

## 2018-11-30 ENCOUNTER — HISTORICAL (OUTPATIENT)
Dept: RADIATION THERAPY | Facility: HOSPITAL | Age: 48
End: 2018-11-30

## 2018-12-03 ENCOUNTER — HISTORICAL (OUTPATIENT)
Dept: RADIATION THERAPY | Facility: HOSPITAL | Age: 48
End: 2018-12-03

## 2018-12-03 ENCOUNTER — HISTORICAL (OUTPATIENT)
Dept: LAB | Facility: HOSPITAL | Age: 48
End: 2018-12-03

## 2018-12-03 LAB
ABS NEUT (OLG): 3.2 X10(3)/MCL (ref 1.5–6.9)
ALBUMIN SERPL-MCNC: 3.8 GM/DL (ref 3.4–5)
ALBUMIN/GLOB SERPL: 1 RATIO
ALP SERPL-CCNC: 84 UNIT/L (ref 30–113)
ALT SERPL-CCNC: 23 UNIT/L (ref 10–45)
AST SERPL-CCNC: 16 UNIT/L (ref 15–37)
BASOPHILS # BLD AUTO: 0 X10(3)/MCL (ref 0–0.1)
BASOPHILS NFR BLD AUTO: 1 % (ref 0–1)
BILIRUB SERPL-MCNC: 0.2 MG/DL (ref 0.1–0.9)
BILIRUBIN DIRECT+TOT PNL SERPL-MCNC: <0.05 MG/DL (ref 0–0.3)
BILIRUBIN DIRECT+TOT PNL SERPL-MCNC: >0.15 MG/DL
BUN SERPL-MCNC: 11 MG/DL (ref 10–20)
CALCIUM SERPL-MCNC: 9.6 MG/DL (ref 8–10.5)
CHLORIDE SERPL-SCNC: 102 MMOL/L (ref 100–108)
CO2 SERPL-SCNC: 32 MMOL/L (ref 21–35)
CREAT SERPL-MCNC: 0.94 MG/DL (ref 0.7–1.3)
EOSINOPHIL # BLD AUTO: 0.2 X10(3)/MCL (ref 0–0.6)
EOSINOPHIL NFR BLD AUTO: 4 % (ref 0–5)
ERYTHROCYTE [DISTWIDTH] IN BLOOD BY AUTOMATED COUNT: 11.6 % (ref 11.5–17)
GLOBULIN SER-MCNC: 3.9 GM/DL
GLUCOSE SERPL-MCNC: 79 MG/DL (ref 75–116)
HCT VFR BLD AUTO: 38.7 % (ref 36–48)
HGB BLD-MCNC: 12.3 GM/DL (ref 12–16)
LYMPHOCYTES # BLD AUTO: 1.4 X10(3)/MCL (ref 0.5–4.1)
LYMPHOCYTES NFR BLD AUTO: 27 % (ref 15–40)
MCH RBC QN AUTO: 30 PG (ref 27–34)
MCHC RBC AUTO-ENTMCNC: 32 GM/DL (ref 31–36)
MCV RBC AUTO: 96 FL (ref 80–99)
MONOCYTES # BLD AUTO: 0.4 X10(3)/MCL (ref 0–1.1)
MONOCYTES NFR BLD AUTO: 7 % (ref 4–12)
NEUTROPHILS # BLD AUTO: 3.2 X10(3)/MCL (ref 1.5–6.9)
NEUTROPHILS NFR BLD AUTO: 62 % (ref 43–75)
PLATELET # BLD AUTO: 240 X10(3)/MCL (ref 140–400)
PMV BLD AUTO: 10 FL (ref 6.8–10)
POTASSIUM SERPL-SCNC: 4.2 MMOL/L (ref 3.6–5.2)
PROT SERPL-MCNC: 7.7 GM/DL (ref 6.4–8.2)
RBC # BLD AUTO: 4.03 X10(6)/MCL (ref 4.2–5.4)
SODIUM SERPL-SCNC: 142 MMOL/L (ref 135–145)
WBC # SPEC AUTO: 5.3 X10(3)/MCL (ref 4.5–11.5)

## 2018-12-04 ENCOUNTER — HISTORICAL (OUTPATIENT)
Dept: RADIATION THERAPY | Facility: HOSPITAL | Age: 48
End: 2018-12-04

## 2018-12-05 ENCOUNTER — HISTORICAL (OUTPATIENT)
Dept: RADIATION THERAPY | Facility: HOSPITAL | Age: 48
End: 2018-12-05

## 2018-12-06 ENCOUNTER — HISTORICAL (OUTPATIENT)
Dept: RADIATION THERAPY | Facility: HOSPITAL | Age: 48
End: 2018-12-06

## 2018-12-07 ENCOUNTER — HISTORICAL (OUTPATIENT)
Dept: RADIATION THERAPY | Facility: HOSPITAL | Age: 48
End: 2018-12-07

## 2018-12-10 ENCOUNTER — HISTORICAL (OUTPATIENT)
Dept: RADIATION THERAPY | Facility: HOSPITAL | Age: 48
End: 2018-12-10

## 2018-12-11 ENCOUNTER — HISTORICAL (OUTPATIENT)
Dept: RADIATION THERAPY | Facility: HOSPITAL | Age: 48
End: 2018-12-11

## 2019-01-07 ENCOUNTER — HISTORICAL (OUTPATIENT)
Dept: LAB | Facility: HOSPITAL | Age: 49
End: 2019-01-07

## 2019-01-07 LAB
ABS NEUT (OLG): 3.4 X10(3)/MCL (ref 1.5–6.9)
ALBUMIN SERPL-MCNC: 3.7 GM/DL (ref 3.4–5)
ALBUMIN/GLOB SERPL: 0.9 RATIO
ALP SERPL-CCNC: 75 UNIT/L (ref 30–113)
ALT SERPL-CCNC: 15 UNIT/L (ref 10–45)
AST SERPL-CCNC: 15 UNIT/L (ref 15–37)
BASOPHILS # BLD AUTO: 0 X10(3)/MCL (ref 0–0.1)
BASOPHILS NFR BLD AUTO: 0 % (ref 0–1)
BILIRUB SERPL-MCNC: 0.3 MG/DL (ref 0.1–0.9)
BILIRUBIN DIRECT+TOT PNL SERPL-MCNC: 0.1 MG/DL (ref 0–0.3)
BILIRUBIN DIRECT+TOT PNL SERPL-MCNC: 0.2 MG/DL
BUN SERPL-MCNC: 16 MG/DL (ref 10–20)
CALCIUM SERPL-MCNC: 9.1 MG/DL (ref 8–10.5)
CHLORIDE SERPL-SCNC: 102 MMOL/L (ref 100–108)
CO2 SERPL-SCNC: 30 MMOL/L (ref 21–35)
CREAT SERPL-MCNC: 1.1 MG/DL (ref 0.7–1.3)
EOSINOPHIL # BLD AUTO: 0.2 X10(3)/MCL (ref 0–0.6)
EOSINOPHIL NFR BLD AUTO: 3 % (ref 0–5)
ERYTHROCYTE [DISTWIDTH] IN BLOOD BY AUTOMATED COUNT: 12.5 % (ref 11.5–17)
GLOBULIN SER-MCNC: 4 GM/DL
GLUCOSE SERPL-MCNC: 87 MG/DL (ref 75–116)
HCT VFR BLD AUTO: 38.4 % (ref 36–48)
HGB BLD-MCNC: 12.2 GM/DL (ref 12–16)
IMM GRANULOCYTES # BLD AUTO: 0.01 10*3/UL (ref 0–0.02)
IMM GRANULOCYTES NFR BLD AUTO: 0.2 % (ref 0–0.43)
LDH SERPL-CCNC: 135 UNIT/L (ref 112–240)
LYMPHOCYTES # BLD AUTO: 1.1 X10(3)/MCL (ref 0.5–4.1)
LYMPHOCYTES NFR BLD AUTO: 21 % (ref 15–40)
MCH RBC QN AUTO: 30 PG (ref 27–34)
MCHC RBC AUTO-ENTMCNC: 32 GM/DL (ref 31–36)
MCV RBC AUTO: 95 FL (ref 80–99)
MONOCYTES # BLD AUTO: 0.4 X10(3)/MCL (ref 0–1.1)
MONOCYTES NFR BLD AUTO: 7 % (ref 4–12)
NEUTROPHILS # BLD AUTO: 3.4 X10(3)/MCL (ref 1.5–6.9)
NEUTROPHILS NFR BLD AUTO: 68 % (ref 43–75)
PLATELET # BLD AUTO: 240 X10(3)/MCL (ref 140–400)
PMV BLD AUTO: 10 FL (ref 6.8–10)
POTASSIUM SERPL-SCNC: 4.2 MMOL/L (ref 3.6–5.2)
PROT SERPL-MCNC: 7.7 GM/DL (ref 6.4–8.2)
RBC # BLD AUTO: 4.04 X10(6)/MCL (ref 4.2–5.4)
SODIUM SERPL-SCNC: 140 MMOL/L (ref 135–145)
WBC # SPEC AUTO: 5 X10(3)/MCL (ref 4.5–11.5)

## 2019-01-08 ENCOUNTER — HISTORICAL (OUTPATIENT)
Dept: INFUSION THERAPY | Facility: HOSPITAL | Age: 49
End: 2019-01-08

## 2019-01-24 ENCOUNTER — HISTORICAL (OUTPATIENT)
Dept: RADIOLOGY | Facility: HOSPITAL | Age: 49
End: 2019-01-24

## 2019-02-22 ENCOUNTER — HISTORICAL (OUTPATIENT)
Dept: INFUSION THERAPY | Facility: HOSPITAL | Age: 49
End: 2019-02-22

## 2019-02-22 LAB
ABS NEUT (OLG): 3.7 X10(3)/MCL (ref 1.5–6.9)
ALBUMIN SERPL-MCNC: 3.7 GM/DL (ref 3.4–5)
ALBUMIN/GLOB SERPL: 0.9 RATIO
ALP SERPL-CCNC: 85 UNIT/L (ref 30–113)
ALT SERPL-CCNC: 22 UNIT/L (ref 10–45)
AST SERPL-CCNC: 18 UNIT/L (ref 15–37)
BILIRUB SERPL-MCNC: 0.3 MG/DL (ref 0.1–0.9)
BILIRUBIN DIRECT+TOT PNL SERPL-MCNC: 0.1 MG/DL (ref 0–0.3)
BILIRUBIN DIRECT+TOT PNL SERPL-MCNC: 0.2 MG/DL
BUN SERPL-MCNC: 13 MG/DL (ref 10–20)
CALCIUM SERPL-MCNC: 9.3 MG/DL (ref 8–10.5)
CHLORIDE SERPL-SCNC: 104 MMOL/L (ref 100–108)
CO2 SERPL-SCNC: 28 MMOL/L (ref 21–35)
CREAT SERPL-MCNC: 1.05 MG/DL (ref 0.7–1.3)
ERYTHROCYTE [DISTWIDTH] IN BLOOD BY AUTOMATED COUNT: 12.7 % (ref 11.5–17)
GLOBULIN SER-MCNC: 3.9 GM/DL
GLUCOSE SERPL-MCNC: 90 MG/DL (ref 75–116)
HCT VFR BLD AUTO: 36.9 % (ref 36–48)
HGB BLD-MCNC: 11.8 GM/DL (ref 12–16)
LDH SERPL-CCNC: 138 UNIT/L (ref 112–240)
MCH RBC QN AUTO: 32 PG (ref 27–34)
MCHC RBC AUTO-ENTMCNC: 32 GM/DL (ref 31–36)
MCV RBC AUTO: 100 FL (ref 80–99)
PLATELET # BLD AUTO: 251 X10(3)/MCL (ref 140–400)
PMV BLD AUTO: 9.7 FL (ref 6.8–10)
POTASSIUM SERPL-SCNC: 4.6 MMOL/L (ref 3.6–5.2)
PROT SERPL-MCNC: 7.6 GM/DL (ref 6.4–8.2)
RBC # BLD AUTO: 3.71 X10(6)/MCL (ref 4.2–5.4)
SODIUM SERPL-SCNC: 142 MMOL/L (ref 135–145)
WBC # SPEC AUTO: 5.6 X10(3)/MCL (ref 4.5–11.5)

## 2019-03-14 ENCOUNTER — HISTORICAL (OUTPATIENT)
Dept: RADIATION THERAPY | Facility: HOSPITAL | Age: 49
End: 2019-03-14

## 2019-03-19 ENCOUNTER — HISTORICAL (OUTPATIENT)
Dept: RADIOLOGY | Facility: HOSPITAL | Age: 49
End: 2019-03-19

## 2019-03-19 ENCOUNTER — HISTORICAL (OUTPATIENT)
Dept: LAB | Facility: HOSPITAL | Age: 49
End: 2019-03-19

## 2019-03-19 LAB
ABS NEUT (OLG): 4.3 X10(3)/MCL (ref 1.5–6.9)
ALBUMIN SERPL-MCNC: 3.6 GM/DL (ref 3.4–5)
ALBUMIN/GLOB SERPL: 1 RATIO
ALP SERPL-CCNC: 78 UNIT/L (ref 30–113)
ALT SERPL-CCNC: 16 UNIT/L (ref 10–45)
AST SERPL-CCNC: 11 UNIT/L (ref 15–37)
BASOPHILS # BLD AUTO: 0 X10(3)/MCL (ref 0–0.1)
BASOPHILS NFR BLD AUTO: 0 % (ref 0–1)
BILIRUB SERPL-MCNC: 0.3 MG/DL (ref 0.1–0.9)
BILIRUBIN DIRECT+TOT PNL SERPL-MCNC: 0.1 MG/DL (ref 0–0.3)
BILIRUBIN DIRECT+TOT PNL SERPL-MCNC: 0.2 MG/DL
BUN SERPL-MCNC: 13 MG/DL (ref 10–20)
CALCIUM SERPL-MCNC: 8.7 MG/DL (ref 8–10.5)
CHLORIDE SERPL-SCNC: 104 MMOL/L (ref 100–108)
CO2 SERPL-SCNC: 30 MMOL/L (ref 21–35)
CREAT SERPL-MCNC: 1.15 MG/DL (ref 0.7–1.3)
EOSINOPHIL # BLD AUTO: 0.3 X10(3)/MCL (ref 0–0.6)
EOSINOPHIL NFR BLD AUTO: 5 % (ref 0–5)
ERYTHROCYTE [DISTWIDTH] IN BLOOD BY AUTOMATED COUNT: 11.9 % (ref 11.5–17)
GLOBULIN SER-MCNC: 3.5 GM/DL
GLUCOSE SERPL-MCNC: 87 MG/DL (ref 75–116)
HCT VFR BLD AUTO: 34.8 % (ref 36–48)
HGB BLD-MCNC: 10.8 GM/DL (ref 12–16)
IMM GRANULOCYTES # BLD AUTO: 0.01 10*3/UL (ref 0–0.02)
IMM GRANULOCYTES NFR BLD AUTO: 0.2 % (ref 0–0.43)
LDH SERPL-CCNC: 154 UNIT/L (ref 112–240)
LYMPHOCYTES # BLD AUTO: 1 X10(3)/MCL (ref 0.5–4.1)
LYMPHOCYTES NFR BLD AUTO: 16 % (ref 15–40)
MCH RBC QN AUTO: 31 PG (ref 27–34)
MCHC RBC AUTO-ENTMCNC: 31 GM/DL (ref 31–36)
MCV RBC AUTO: 99 FL (ref 80–99)
MONOCYTES # BLD AUTO: 0.3 X10(3)/MCL (ref 0–1.1)
MONOCYTES NFR BLD AUTO: 6 % (ref 4–12)
NEUTROPHILS # BLD AUTO: 4.3 X10(3)/MCL (ref 1.5–6.9)
NEUTROPHILS NFR BLD AUTO: 72 % (ref 43–75)
PLATELET # BLD AUTO: 203 X10(3)/MCL (ref 140–400)
PMV BLD AUTO: 10.5 FL (ref 6.8–10)
POTASSIUM SERPL-SCNC: 4.3 MMOL/L (ref 3.6–5.2)
PROT SERPL-MCNC: 7.1 GM/DL (ref 6.4–8.2)
RBC # BLD AUTO: 3.51 X10(6)/MCL (ref 4.2–5.4)
SODIUM SERPL-SCNC: 141 MMOL/L (ref 135–145)
WBC # SPEC AUTO: 5.9 X10(3)/MCL (ref 4.5–11.5)

## 2019-03-29 ENCOUNTER — HISTORICAL (OUTPATIENT)
Dept: INFUSION THERAPY | Facility: HOSPITAL | Age: 49
End: 2019-03-29

## 2019-05-21 ENCOUNTER — HISTORICAL (OUTPATIENT)
Dept: LAB | Facility: HOSPITAL | Age: 49
End: 2019-05-21

## 2019-05-21 LAB
ABS NEUT (OLG): 5.9 X10(3)/MCL (ref 1.5–6.9)
ALBUMIN SERPL-MCNC: 3.7 GM/DL (ref 3.4–5)
ALBUMIN/GLOB SERPL: 0.9 RATIO
ALP SERPL-CCNC: 83 UNIT/L (ref 30–113)
ALT SERPL-CCNC: 14 UNIT/L (ref 10–45)
AST SERPL-CCNC: 14 UNIT/L (ref 15–37)
BASOPHILS # BLD AUTO: 0 X10(3)/MCL (ref 0–0.1)
BASOPHILS NFR BLD AUTO: 0 % (ref 0–1)
BILIRUB SERPL-MCNC: 0.4 MG/DL (ref 0.1–0.9)
BILIRUBIN DIRECT+TOT PNL SERPL-MCNC: 0.1 MG/DL (ref 0–0.3)
BILIRUBIN DIRECT+TOT PNL SERPL-MCNC: 0.3 MG/DL
BUN SERPL-MCNC: 16 MG/DL (ref 10–20)
CALCIUM SERPL-MCNC: 9.2 MG/DL (ref 8–10.5)
CHLORIDE SERPL-SCNC: 102 MMOL/L (ref 100–108)
CO2 SERPL-SCNC: 31 MMOL/L (ref 21–35)
CREAT SERPL-MCNC: 1.1 MG/DL (ref 0.7–1.3)
EOSINOPHIL # BLD AUTO: 0.2 X10(3)/MCL (ref 0–0.6)
EOSINOPHIL NFR BLD AUTO: 2 % (ref 0–5)
ERYTHROCYTE [DISTWIDTH] IN BLOOD BY AUTOMATED COUNT: 11.9 % (ref 11.5–17)
GLOBULIN SER-MCNC: 4 GM/DL
GLUCOSE SERPL-MCNC: 89 MG/DL (ref 75–116)
HCT VFR BLD AUTO: 40.1 % (ref 36–48)
HGB BLD-MCNC: 12.5 GM/DL (ref 12–16)
IMM GRANULOCYTES # BLD AUTO: 0.02 10*3/UL (ref 0–0.02)
IMM GRANULOCYTES NFR BLD AUTO: 0.2 % (ref 0–0.43)
LDH SERPL-CCNC: 149 UNIT/L (ref 112–240)
LYMPHOCYTES # BLD AUTO: 1.4 X10(3)/MCL (ref 0.5–4.1)
LYMPHOCYTES NFR BLD AUTO: 17 % (ref 15–40)
MCH RBC QN AUTO: 30 PG (ref 27–34)
MCHC RBC AUTO-ENTMCNC: 31 GM/DL (ref 31–36)
MCV RBC AUTO: 97 FL (ref 80–99)
MONOCYTES # BLD AUTO: 0.5 X10(3)/MCL (ref 0–1.1)
MONOCYTES NFR BLD AUTO: 6 % (ref 4–12)
NEUTROPHILS # BLD AUTO: 5.9 X10(3)/MCL (ref 1.5–6.9)
NEUTROPHILS NFR BLD AUTO: 73 % (ref 43–75)
PLATELET # BLD AUTO: 235 X10(3)/MCL (ref 140–400)
PMV BLD AUTO: 10 FL (ref 6.8–10)
POTASSIUM SERPL-SCNC: 4.8 MMOL/L (ref 3.6–5.2)
PROT SERPL-MCNC: 7.7 GM/DL (ref 6.4–8.2)
RBC # BLD AUTO: 4.13 X10(6)/MCL (ref 4.2–5.4)
SODIUM SERPL-SCNC: 140 MMOL/L (ref 135–145)
WBC # SPEC AUTO: 8 X10(3)/MCL (ref 4.5–11.5)

## 2019-05-22 ENCOUNTER — HISTORICAL (OUTPATIENT)
Dept: INFUSION THERAPY | Facility: HOSPITAL | Age: 49
End: 2019-05-22

## 2019-08-20 ENCOUNTER — HISTORICAL (OUTPATIENT)
Dept: LAB | Facility: HOSPITAL | Age: 49
End: 2019-08-20

## 2019-08-20 LAB
ABS NEUT (OLG): 3.8 X10(3)/MCL (ref 1.5–6.9)
ALBUMIN SERPL-MCNC: 3.9 GM/DL (ref 3.4–5)
ALBUMIN/GLOB SERPL: 1.1 RATIO
ALP SERPL-CCNC: 82 UNIT/L (ref 30–113)
ALT SERPL-CCNC: 19 UNIT/L (ref 10–45)
AST SERPL-CCNC: 14 UNIT/L (ref 15–37)
BASOPHILS # BLD AUTO: 0 X10(3)/MCL (ref 0–0.1)
BASOPHILS NFR BLD AUTO: 1 % (ref 0–1)
BILIRUB SERPL-MCNC: 0.4 MG/DL (ref 0.1–0.9)
BILIRUBIN DIRECT+TOT PNL SERPL-MCNC: 0.1 MG/DL (ref 0–0.3)
BILIRUBIN DIRECT+TOT PNL SERPL-MCNC: 0.3 MG/DL
BUN SERPL-MCNC: 13 MG/DL (ref 10–20)
CALCIUM SERPL-MCNC: 9.3 MG/DL (ref 8–10.5)
CHLORIDE SERPL-SCNC: 105 MMOL/L (ref 100–108)
CO2 SERPL-SCNC: 29 MMOL/L (ref 21–35)
CREAT SERPL-MCNC: 1.09 MG/DL (ref 0.7–1.3)
EOSINOPHIL # BLD AUTO: 0.1 X10(3)/MCL (ref 0–0.6)
EOSINOPHIL NFR BLD AUTO: 2 % (ref 0–5)
ERYTHROCYTE [DISTWIDTH] IN BLOOD BY AUTOMATED COUNT: 12.5 % (ref 11.5–17)
GLOBULIN SER-MCNC: 3.6 GM/DL
GLUCOSE SERPL-MCNC: 94 MG/DL (ref 75–116)
HCT VFR BLD AUTO: 37.8 % (ref 36–48)
HGB BLD-MCNC: 12.3 GM/DL (ref 12–16)
IMM GRANULOCYTES # BLD AUTO: 0.01 10*3/UL (ref 0–0.02)
IMM GRANULOCYTES NFR BLD AUTO: 0.2 % (ref 0–0.43)
LDH SERPL-CCNC: 171 UNIT/L (ref 112–240)
LYMPHOCYTES # BLD AUTO: 1.5 X10(3)/MCL (ref 0.5–4.1)
LYMPHOCYTES NFR BLD AUTO: 25 % (ref 15–40)
MCH RBC QN AUTO: 32 PG (ref 27–34)
MCHC RBC AUTO-ENTMCNC: 32 GM/DL (ref 31–36)
MCV RBC AUTO: 98 FL (ref 80–99)
MONOCYTES # BLD AUTO: 0.4 X10(3)/MCL (ref 0–1.1)
MONOCYTES NFR BLD AUTO: 7 % (ref 4–12)
NEUTROPHILS # BLD AUTO: 3.8 X10(3)/MCL (ref 1.5–6.9)
NEUTROPHILS NFR BLD AUTO: 64 % (ref 43–75)
PLATELET # BLD AUTO: 205 X10(3)/MCL (ref 140–400)
PMV BLD AUTO: 9.9 FL (ref 6.8–10)
POTASSIUM SERPL-SCNC: 4.6 MMOL/L (ref 3.6–5.2)
PROT SERPL-MCNC: 7.5 GM/DL (ref 6.4–8.2)
RBC # BLD AUTO: 3.86 X10(6)/MCL (ref 4.2–5.4)
SODIUM SERPL-SCNC: 143 MMOL/L (ref 135–145)
WBC # SPEC AUTO: 5.9 X10(3)/MCL (ref 4.5–11.5)

## 2019-08-22 ENCOUNTER — HISTORICAL (OUTPATIENT)
Dept: INFUSION THERAPY | Facility: HOSPITAL | Age: 49
End: 2019-08-22

## 2019-09-05 ENCOUNTER — HISTORICAL (OUTPATIENT)
Dept: RADIOLOGY | Facility: HOSPITAL | Age: 49
End: 2019-09-05

## 2019-09-10 ENCOUNTER — HISTORICAL (OUTPATIENT)
Dept: RADIOLOGY | Facility: HOSPITAL | Age: 49
End: 2019-09-10

## 2019-09-12 ENCOUNTER — HISTORICAL (OUTPATIENT)
Dept: RADIATION THERAPY | Facility: HOSPITAL | Age: 49
End: 2019-09-12

## 2019-09-18 ENCOUNTER — HISTORICAL (OUTPATIENT)
Dept: LAB | Facility: HOSPITAL | Age: 49
End: 2019-09-18

## 2019-09-18 LAB
ABS NEUT (OLG): 3.3 X10(3)/MCL (ref 1.5–6.9)
ALBUMIN SERPL-MCNC: 4 GM/DL (ref 3.4–5)
ALBUMIN/GLOB SERPL: 0.9 RATIO
ALP SERPL-CCNC: 91 UNIT/L (ref 30–113)
ALT SERPL-CCNC: 16 UNIT/L (ref 10–45)
AST SERPL-CCNC: 23 UNIT/L (ref 15–37)
BASOPHILS # BLD AUTO: 0 X10(3)/MCL (ref 0–0.1)
BASOPHILS NFR BLD AUTO: 1 % (ref 0–1)
BILIRUB SERPL-MCNC: 0.3 MG/DL (ref 0.1–0.9)
BILIRUBIN DIRECT+TOT PNL SERPL-MCNC: 0.1 MG/DL (ref 0–0.3)
BILIRUBIN DIRECT+TOT PNL SERPL-MCNC: 0.2 MG/DL
BUN SERPL-MCNC: 23 MG/DL (ref 10–20)
CALCIUM SERPL-MCNC: 9.1 MG/DL (ref 8–10.5)
CHLORIDE SERPL-SCNC: 99 MMOL/L (ref 100–108)
CO2 SERPL-SCNC: 31 MMOL/L (ref 21–35)
CREAT SERPL-MCNC: 1.45 MG/DL (ref 0.7–1.3)
EOSINOPHIL # BLD AUTO: 0.2 X10(3)/MCL (ref 0–0.6)
EOSINOPHIL NFR BLD AUTO: 3 % (ref 0–5)
ERYTHROCYTE [DISTWIDTH] IN BLOOD BY AUTOMATED COUNT: 12.1 % (ref 11.5–17)
GLOBULIN SER-MCNC: 4.4 GM/DL
GLUCOSE SERPL-MCNC: 92 MG/DL (ref 75–116)
HCT VFR BLD AUTO: 41.6 % (ref 36–48)
HGB BLD-MCNC: 13.2 GM/DL (ref 12–16)
IMM GRANULOCYTES # BLD AUTO: 0.01 10*3/UL (ref 0–0.02)
IMM GRANULOCYTES NFR BLD AUTO: 0.2 % (ref 0–0.43)
LDH SERPL-CCNC: 221 UNIT/L (ref 112–240)
LYMPHOCYTES # BLD AUTO: 1.2 X10(3)/MCL (ref 0.5–4.1)
LYMPHOCYTES NFR BLD AUTO: 23 % (ref 15–40)
MCH RBC QN AUTO: 31 PG (ref 27–34)
MCHC RBC AUTO-ENTMCNC: 32 GM/DL (ref 31–36)
MCV RBC AUTO: 98 FL (ref 80–99)
MONOCYTES # BLD AUTO: 0.5 X10(3)/MCL (ref 0–1.1)
MONOCYTES NFR BLD AUTO: 9 % (ref 4–12)
NEUTROPHILS # BLD AUTO: 3.3 X10(3)/MCL (ref 1.5–6.9)
NEUTROPHILS NFR BLD AUTO: 64 % (ref 43–75)
PLATELET # BLD AUTO: 229 X10(3)/MCL (ref 140–400)
PMV BLD AUTO: 9.8 FL (ref 6.8–10)
POTASSIUM SERPL-SCNC: 4.2 MMOL/L (ref 3.6–5.2)
PROT SERPL-MCNC: 8.4 GM/DL (ref 6.4–8.2)
RBC # BLD AUTO: 4.23 X10(6)/MCL (ref 4.2–5.4)
SODIUM SERPL-SCNC: 138 MMOL/L (ref 135–145)
WBC # SPEC AUTO: 5.2 X10(3)/MCL (ref 4.5–11.5)

## 2019-10-21 ENCOUNTER — HISTORICAL (OUTPATIENT)
Dept: LAB | Facility: HOSPITAL | Age: 49
End: 2019-10-21

## 2019-10-21 LAB — LDH SERPL-CCNC: 202 UNIT/L (ref 112–240)

## 2019-11-18 ENCOUNTER — HISTORICAL (OUTPATIENT)
Dept: LAB | Facility: HOSPITAL | Age: 49
End: 2019-11-18

## 2019-11-18 LAB
ABS NEUT (OLG): 3.5 X10(3)/MCL (ref 1.5–6.9)
ALBUMIN SERPL-MCNC: 3.7 GM/DL (ref 3.4–5)
ALBUMIN/GLOB SERPL: 1 RATIO
ALP SERPL-CCNC: 86 UNIT/L (ref 30–113)
ALT SERPL-CCNC: 23 UNIT/L (ref 10–45)
AST SERPL-CCNC: 18 UNIT/L (ref 15–37)
BASOPHILS # BLD AUTO: 0 X10(3)/MCL (ref 0–0.1)
BASOPHILS NFR BLD AUTO: 1 % (ref 0–1)
BILIRUB SERPL-MCNC: 0.2 MG/DL (ref 0.1–0.9)
BILIRUBIN DIRECT+TOT PNL SERPL-MCNC: 0.1 MG/DL
BILIRUBIN DIRECT+TOT PNL SERPL-MCNC: 0.1 MG/DL (ref 0–0.3)
BUN SERPL-MCNC: 19 MG/DL (ref 10–20)
CALCIUM SERPL-MCNC: 8.8 MG/DL (ref 8–10.5)
CHLORIDE SERPL-SCNC: 103 MMOL/L (ref 100–108)
CO2 SERPL-SCNC: 35 MMOL/L (ref 21–35)
CREAT SERPL-MCNC: 1.22 MG/DL (ref 0.7–1.3)
EOSINOPHIL # BLD AUTO: 0.3 X10(3)/MCL (ref 0–0.6)
EOSINOPHIL NFR BLD AUTO: 5 % (ref 0–5)
ERYTHROCYTE [DISTWIDTH] IN BLOOD BY AUTOMATED COUNT: 12.3 % (ref 11.5–17)
GLOBULIN SER-MCNC: 3.6 GM/DL
GLUCOSE SERPL-MCNC: 92 MG/DL (ref 75–116)
HCT VFR BLD AUTO: 39.4 % (ref 36–48)
HGB BLD-MCNC: 12.5 GM/DL (ref 12–16)
IMM GRANULOCYTES # BLD AUTO: 0.03 10*3/UL (ref 0–0.02)
IMM GRANULOCYTES NFR BLD AUTO: 0.5 % (ref 0–0.43)
LDH SERPL-CCNC: 181 UNIT/L (ref 112–240)
LYMPHOCYTES # BLD AUTO: 1.3 X10(3)/MCL (ref 0.5–4.1)
LYMPHOCYTES NFR BLD AUTO: 24 % (ref 15–40)
MCH RBC QN AUTO: 32 PG (ref 27–34)
MCHC RBC AUTO-ENTMCNC: 32 GM/DL (ref 31–36)
MCV RBC AUTO: 101 FL (ref 80–99)
MONOCYTES # BLD AUTO: 0.4 X10(3)/MCL (ref 0–1.1)
MONOCYTES NFR BLD AUTO: 7 % (ref 4–12)
NEUTROPHILS # BLD AUTO: 3.5 X10(3)/MCL (ref 1.5–6.9)
NEUTROPHILS NFR BLD AUTO: 62 % (ref 43–75)
PLATELET # BLD AUTO: 201 X10(3)/MCL (ref 140–400)
PMV BLD AUTO: 9.8 FL (ref 6.8–10)
POTASSIUM SERPL-SCNC: 4.7 MMOL/L (ref 3.6–5.2)
PROT SERPL-MCNC: 7.3 GM/DL (ref 6.4–8.2)
RBC # BLD AUTO: 3.9 X10(6)/MCL (ref 4.2–5.4)
SODIUM SERPL-SCNC: 142 MMOL/L (ref 135–145)
WBC # SPEC AUTO: 5.6 X10(3)/MCL (ref 4.5–11.5)

## 2019-12-04 ENCOUNTER — HISTORICAL (OUTPATIENT)
Dept: INFUSION THERAPY | Facility: HOSPITAL | Age: 49
End: 2019-12-04

## 2020-01-09 ENCOUNTER — HISTORICAL (OUTPATIENT)
Dept: ENDOSCOPY | Facility: HOSPITAL | Age: 50
End: 2020-01-09

## 2020-01-27 ENCOUNTER — HISTORICAL (OUTPATIENT)
Dept: RADIOLOGY | Facility: HOSPITAL | Age: 50
End: 2020-01-27

## 2020-03-19 ENCOUNTER — HISTORICAL (OUTPATIENT)
Dept: LAB | Facility: HOSPITAL | Age: 50
End: 2020-03-19

## 2020-03-19 LAB
ABS NEUT (OLG): 3.9 X10(3)/MCL (ref 1.5–6.9)
ALBUMIN SERPL-MCNC: 4 GM/DL (ref 3.4–5)
ALBUMIN/GLOB SERPL: 1.1 RATIO
ALP SERPL-CCNC: 79 UNIT/L (ref 30–113)
ALT SERPL-CCNC: 22 UNIT/L (ref 10–45)
AST SERPL-CCNC: 24 UNIT/L (ref 15–37)
BASOPHILS # BLD AUTO: 0 X10(3)/MCL (ref 0–0.1)
BASOPHILS NFR BLD AUTO: 1 % (ref 0–1)
BILIRUB SERPL-MCNC: 0.3 MG/DL (ref 0.1–0.9)
BILIRUBIN DIRECT+TOT PNL SERPL-MCNC: 0.1 MG/DL (ref 0–0.3)
BILIRUBIN DIRECT+TOT PNL SERPL-MCNC: 0.2 MG/DL
BUN SERPL-MCNC: 18 MG/DL (ref 10–20)
CALCIUM SERPL-MCNC: 9.1 MG/DL (ref 8–10.5)
CHLORIDE SERPL-SCNC: 104 MMOL/L (ref 100–108)
CO2 SERPL-SCNC: 32 MMOL/L (ref 21–35)
CREAT SERPL-MCNC: 1.34 MG/DL (ref 0.7–1.3)
EOSINOPHIL # BLD AUTO: 0.3 X10(3)/MCL (ref 0–0.6)
EOSINOPHIL NFR BLD AUTO: 4 % (ref 0–5)
ERYTHROCYTE [DISTWIDTH] IN BLOOD BY AUTOMATED COUNT: 12.2 % (ref 11.5–17)
GLOBULIN SER-MCNC: 3.6 GM/DL
GLUCOSE SERPL-MCNC: 97 MG/DL (ref 75–116)
HCT VFR BLD AUTO: 42 % (ref 36–48)
HGB BLD-MCNC: 13.3 GM/DL (ref 12–16)
IMM GRANULOCYTES # BLD AUTO: 0.02 10*3/UL (ref 0–0.02)
IMM GRANULOCYTES NFR BLD AUTO: 0.3 % (ref 0–0.43)
LDH SERPL-CCNC: 159 UNIT/L (ref 112–240)
LYMPHOCYTES # BLD AUTO: 1.7 X10(3)/MCL (ref 0.5–4.1)
LYMPHOCYTES NFR BLD AUTO: 27 % (ref 15–40)
MCH RBC QN AUTO: 32 PG (ref 27–34)
MCHC RBC AUTO-ENTMCNC: 32 GM/DL (ref 31–36)
MCV RBC AUTO: 101 FL (ref 80–99)
MONOCYTES # BLD AUTO: 0.4 X10(3)/MCL (ref 0–1.1)
MONOCYTES NFR BLD AUTO: 7 % (ref 4–12)
NEUTROPHILS # BLD AUTO: 3.9 X10(3)/MCL (ref 1.5–6.9)
NEUTROPHILS NFR BLD AUTO: 61 % (ref 43–75)
PLATELET # BLD AUTO: 204 X10(3)/MCL (ref 140–400)
PMV BLD AUTO: 9.8 FL (ref 6.8–10)
POTASSIUM SERPL-SCNC: 4.5 MMOL/L (ref 3.6–5.2)
PROT SERPL-MCNC: 7.6 GM/DL (ref 6.4–8.2)
RBC # BLD AUTO: 4.16 X10(6)/MCL (ref 4.2–5.4)
SODIUM SERPL-SCNC: 142 MMOL/L (ref 135–145)
WBC # SPEC AUTO: 6.4 X10(3)/MCL (ref 4.5–11.5)

## 2020-04-27 ENCOUNTER — HISTORICAL (OUTPATIENT)
Dept: INFUSION THERAPY | Facility: HOSPITAL | Age: 50
End: 2020-04-27

## 2020-06-08 ENCOUNTER — HISTORICAL (OUTPATIENT)
Dept: LAB | Facility: HOSPITAL | Age: 50
End: 2020-06-08

## 2020-06-08 LAB
ABS NEUT (OLG): 3.3 X10(3)/MCL (ref 1.5–6.9)
ALBUMIN SERPL-MCNC: 3.5 GM/DL (ref 3.5–5)
ALBUMIN/GLOB SERPL: 1.1 RATIO (ref 1.1–2)
ALP SERPL-CCNC: 81 UNIT/L (ref 40–150)
ALT SERPL-CCNC: 8 UNIT/L (ref 0–55)
AST SERPL-CCNC: 14 UNIT/L (ref 5–34)
BASOPHILS # BLD AUTO: 0 X10(3)/MCL (ref 0–0.1)
BASOPHILS NFR BLD AUTO: 1 % (ref 0–1)
BILIRUB SERPL-MCNC: 0.2 MG/DL
BILIRUBIN DIRECT+TOT PNL SERPL-MCNC: 0.1 MG/DL (ref 0–0.5)
BILIRUBIN DIRECT+TOT PNL SERPL-MCNC: 0.1 MG/DL (ref 0–0.8)
BUN SERPL-MCNC: 13 MG/DL (ref 9.8–20.1)
CALCIUM SERPL-MCNC: 9 MG/DL (ref 8.4–10.2)
CHLORIDE SERPL-SCNC: 107 MMOL/L (ref 98–107)
CO2 SERPL-SCNC: 27 MMOL/L (ref 22–29)
CREAT SERPL-MCNC: 1.25 MG/DL (ref 0.55–1.02)
EOSINOPHIL # BLD AUTO: 0.2 X10(3)/MCL (ref 0–0.6)
EOSINOPHIL NFR BLD AUTO: 4 % (ref 0–5)
ERYTHROCYTE [DISTWIDTH] IN BLOOD BY AUTOMATED COUNT: 12.1 % (ref 11.5–17)
GLOBULIN SER-MCNC: 3.3 GM/DL (ref 2.4–3.5)
GLUCOSE SERPL-MCNC: 80 MG/DL (ref 74–100)
HCT VFR BLD AUTO: 39 % (ref 36–48)
HGB BLD-MCNC: 12.1 GM/DL (ref 12–16)
IMM GRANULOCYTES # BLD AUTO: 0.03 10*3/UL (ref 0–0.02)
IMM GRANULOCYTES NFR BLD AUTO: 0.6 % (ref 0–0.43)
LDH SERPL-CCNC: 162 UNIT/L (ref 140–271)
LYMPHOCYTES # BLD AUTO: 1.3 X10(3)/MCL (ref 0.5–4.1)
LYMPHOCYTES NFR BLD AUTO: 25 % (ref 15–40)
MCH RBC QN AUTO: 32 PG (ref 27–34)
MCHC RBC AUTO-ENTMCNC: 31 GM/DL (ref 31–36)
MCV RBC AUTO: 102 FL (ref 80–99)
MONOCYTES # BLD AUTO: 0.4 X10(3)/MCL (ref 0–1.1)
MONOCYTES NFR BLD AUTO: 7 % (ref 4–12)
NEUTROPHILS # BLD AUTO: 3.3 X10(3)/MCL (ref 1.5–6.9)
NEUTROPHILS NFR BLD AUTO: 63 % (ref 43–75)
PLATELET # BLD AUTO: 232 X10(3)/MCL (ref 140–400)
PMV BLD AUTO: 9.8 FL (ref 6.8–10)
POTASSIUM SERPL-SCNC: 4.2 MMOL/L (ref 3.5–5.1)
PROT SERPL-MCNC: 6.8 GM/DL (ref 6.4–8.3)
RBC # BLD AUTO: 3.84 X10(6)/MCL (ref 4.2–5.4)
SODIUM SERPL-SCNC: 142 MMOL/L (ref 136–145)
WBC # SPEC AUTO: 5.3 X10(3)/MCL (ref 4.5–11.5)

## 2020-07-20 ENCOUNTER — HISTORICAL (OUTPATIENT)
Dept: INFUSION THERAPY | Facility: HOSPITAL | Age: 50
End: 2020-07-20

## 2020-09-10 ENCOUNTER — HISTORICAL (OUTPATIENT)
Dept: RADIATION THERAPY | Facility: HOSPITAL | Age: 50
End: 2020-09-10

## 2020-10-16 ENCOUNTER — HISTORICAL (OUTPATIENT)
Dept: INFUSION THERAPY | Facility: HOSPITAL | Age: 50
End: 2020-10-16

## 2020-12-02 ENCOUNTER — HISTORICAL (OUTPATIENT)
Dept: LAB | Facility: HOSPITAL | Age: 50
End: 2020-12-02

## 2020-12-02 LAB
ABS NEUT (OLG): 4.4 X10(3)/MCL (ref 1.5–6.9)
ALBUMIN SERPL-MCNC: 4.2 GM/DL (ref 3.5–5)
ALBUMIN/GLOB SERPL: 1.1 RATIO (ref 1.1–2)
ALP SERPL-CCNC: 84 UNIT/L (ref 40–150)
ALT SERPL-CCNC: 15 UNIT/L (ref 0–55)
AST SERPL-CCNC: 19 UNIT/L (ref 5–34)
BASOPHILS # BLD AUTO: 0 X10(3)/MCL (ref 0–0.1)
BASOPHILS NFR BLD AUTO: 1 % (ref 0–1)
BILIRUB SERPL-MCNC: 0.5 MG/DL
BILIRUBIN DIRECT+TOT PNL SERPL-MCNC: 0.2 MG/DL (ref 0–0.5)
BILIRUBIN DIRECT+TOT PNL SERPL-MCNC: 0.3 MG/DL (ref 0–0.8)
BUN SERPL-MCNC: 15 MG/DL (ref 9.8–20.1)
CALCIUM SERPL-MCNC: 9.4 MG/DL (ref 8.4–10.2)
CHLORIDE SERPL-SCNC: 101 MMOL/L (ref 98–107)
CO2 SERPL-SCNC: 29 MMOL/L (ref 22–29)
CREAT SERPL-MCNC: 1.02 MG/DL (ref 0.55–1.02)
EOSINOPHIL # BLD AUTO: 0.2 X10(3)/MCL (ref 0–0.6)
EOSINOPHIL NFR BLD AUTO: 3 % (ref 0–5)
ERYTHROCYTE [DISTWIDTH] IN BLOOD BY AUTOMATED COUNT: 12.8 % (ref 11.5–17)
GLOBULIN SER-MCNC: 3.7 GM/DL (ref 2.4–3.5)
GLUCOSE SERPL-MCNC: 87 MG/DL (ref 74–100)
HCT VFR BLD AUTO: 41.4 % (ref 36–48)
HGB BLD-MCNC: 13.1 GM/DL (ref 12–16)
IMM GRANULOCYTES # BLD AUTO: 0.03 10*3/UL (ref 0–0.02)
IMM GRANULOCYTES NFR BLD AUTO: 0.4 % (ref 0–0.43)
LDH SERPL-CCNC: 202 UNIT/L (ref 140–271)
LYMPHOCYTES # BLD AUTO: 2.1 X10(3)/MCL (ref 0.5–4.1)
LYMPHOCYTES NFR BLD AUTO: 29 % (ref 15–40)
MCH RBC QN AUTO: 31 PG (ref 27–34)
MCHC RBC AUTO-ENTMCNC: 32 GM/DL (ref 31–36)
MCV RBC AUTO: 99 FL (ref 80–99)
MONOCYTES # BLD AUTO: 0.5 X10(3)/MCL (ref 0–1.1)
MONOCYTES NFR BLD AUTO: 7 % (ref 4–12)
NEUTROPHILS # BLD AUTO: 4.4 X10(3)/MCL (ref 1.5–6.9)
NEUTROPHILS NFR BLD AUTO: 61 % (ref 43–75)
PLATELET # BLD AUTO: 240 X10(3)/MCL (ref 140–400)
PMV BLD AUTO: 10.1 FL (ref 6.8–10)
POTASSIUM SERPL-SCNC: 4.4 MMOL/L (ref 3.5–5.1)
PROT SERPL-MCNC: 7.9 GM/DL (ref 6.4–8.3)
RBC # BLD AUTO: 4.17 X10(6)/MCL (ref 4.2–5.4)
SODIUM SERPL-SCNC: 140 MMOL/L (ref 136–145)
WBC # SPEC AUTO: 7.2 X10(3)/MCL (ref 4.5–11.5)

## 2020-12-07 ENCOUNTER — HISTORICAL (OUTPATIENT)
Dept: RADIOLOGY | Facility: HOSPITAL | Age: 50
End: 2020-12-07

## 2020-12-14 ENCOUNTER — HISTORICAL (OUTPATIENT)
Dept: RADIOLOGY | Facility: HOSPITAL | Age: 50
End: 2020-12-14

## 2021-01-08 ENCOUNTER — HISTORICAL (OUTPATIENT)
Dept: INFUSION THERAPY | Facility: HOSPITAL | Age: 51
End: 2021-01-08

## 2021-01-22 ENCOUNTER — HISTORICAL (OUTPATIENT)
Dept: RADIOLOGY | Facility: HOSPITAL | Age: 51
End: 2021-01-22

## 2021-01-22 LAB — CREAT SERPL-MCNC: 1.04 MG/DL (ref 0.55–1.02)

## 2021-01-28 ENCOUNTER — HISTORICAL (OUTPATIENT)
Dept: RADIOLOGY | Facility: HOSPITAL | Age: 51
End: 2021-01-28

## 2021-03-18 ENCOUNTER — HISTORICAL (OUTPATIENT)
Dept: ADMINISTRATIVE | Facility: HOSPITAL | Age: 51
End: 2021-03-18

## 2021-03-30 ENCOUNTER — HISTORICAL (OUTPATIENT)
Dept: ADMINISTRATIVE | Facility: HOSPITAL | Age: 51
End: 2021-03-30

## 2021-03-30 LAB
ALBUMIN SERPL-MCNC: 4 GM/DL (ref 3.5–5)
ALBUMIN/GLOB SERPL: 1.2 RATIO (ref 1.1–2)
ALP SERPL-CCNC: 79 UNIT/L (ref 40–150)
ALT SERPL-CCNC: 11 UNIT/L (ref 0–55)
AST SERPL-CCNC: 17 UNIT/L (ref 5–34)
BILIRUB SERPL-MCNC: 0.2 MG/DL
BILIRUBIN DIRECT+TOT PNL SERPL-MCNC: 0 MG/DL (ref 0–0.8)
BILIRUBIN DIRECT+TOT PNL SERPL-MCNC: 0.2 MG/DL (ref 0–0.5)
BUN SERPL-MCNC: 21.5 MG/DL (ref 9.8–20.1)
CALCIUM SERPL-MCNC: 9 MG/DL (ref 8.4–10.2)
CHLORIDE SERPL-SCNC: 105 MMOL/L (ref 98–107)
CO2 SERPL-SCNC: 30 MMOL/L (ref 22–29)
CREAT SERPL-MCNC: 1.19 MG/DL (ref 0.55–1.02)
EST. AVERAGE GLUCOSE BLD GHB EST-MCNC: 91.1 MG/DL
FOLATE SERPL-MCNC: 5.2 NG/ML (ref 7–31.4)
GLOBULIN SER-MCNC: 3.2 GM/DL (ref 2.4–3.5)
GLUCOSE SERPL-MCNC: 82 MG/DL (ref 74–100)
HBA1C MFR BLD: 4.8 %
POTASSIUM SERPL-SCNC: 4.7 MMOL/L (ref 3.5–5.1)
PROT SERPL-MCNC: 7.2 GM/DL (ref 6.4–8.3)
SODIUM SERPL-SCNC: 141 MMOL/L (ref 136–145)
T4 FREE SERPL-MCNC: 0.83 NG/DL (ref 0.7–1.48)
TSH SERPL-ACNC: 1.67 UIU/ML (ref 0.35–4.94)
VIT B12 SERPL-MCNC: 414 PG/ML (ref 213–816)

## 2021-04-05 ENCOUNTER — HISTORICAL (OUTPATIENT)
Dept: INFUSION THERAPY | Facility: HOSPITAL | Age: 51
End: 2021-04-05

## 2021-05-18 LAB
ABS NEUT (OLG): 3.5 X10(3)/MCL (ref 1.5–6.9)
APTT PPP: 30.4 SEC (ref 23.4–34.9)
BUN SERPL-MCNC: 11 MG/DL (ref 9.8–20.1)
CALCIUM SERPL-MCNC: 9.1 MG/DL (ref 8.4–10.2)
CHLORIDE SERPL-SCNC: 104 MMOL/L (ref 98–107)
CO2 SERPL-SCNC: 32 MMOL/L (ref 22–29)
CREAT SERPL-MCNC: 1.1 MG/DL (ref 0.55–1.02)
CREAT/UREA NIT SERPL: 10
ERYTHROCYTE [DISTWIDTH] IN BLOOD BY AUTOMATED COUNT: 12.6 % (ref 11.5–17)
EST CREAT CLEARANCE SER (OHS): 80.86 ML/MIN
GLUCOSE SERPL-MCNC: 82 MG/DL (ref 74–100)
HCT VFR BLD AUTO: 38.8 % (ref 36–48)
HGB BLD-MCNC: 12.2 GM/DL (ref 12–16)
INR PPP: 1 (ref 2–3)
MCH RBC QN AUTO: 32 PG (ref 27–34)
MCHC RBC AUTO-ENTMCNC: 31 GM/DL (ref 31–36)
MCV RBC AUTO: 101 FL (ref 80–99)
PLATELET # BLD AUTO: 193 X10(3)/MCL (ref 140–400)
PMV BLD AUTO: 10.1 FL (ref 6.8–10)
POTASSIUM SERPL-SCNC: 4.3 MMOL/L (ref 3.5–5.1)
PROTHROMBIN TIME: 13.2 SEC (ref 11.7–14.5)
RBC # BLD AUTO: 3.85 X10(6)/MCL (ref 4.2–5.4)
SARS-COV-2 AG RESP QL IA.RAPID: NOT DETECTED
SODIUM SERPL-SCNC: 142 MMOL/L (ref 136–145)
WBC # SPEC AUTO: 5.4 X10(3)/MCL (ref 4.5–11.5)

## 2021-05-20 ENCOUNTER — HISTORICAL (OUTPATIENT)
Dept: ANESTHESIOLOGY | Facility: HOSPITAL | Age: 51
End: 2021-05-20

## 2021-06-07 ENCOUNTER — HISTORICAL (OUTPATIENT)
Dept: LAB | Facility: HOSPITAL | Age: 51
End: 2021-06-07

## 2021-06-07 LAB
ABS NEUT (OLG): 3.6 X10(3)/MCL (ref 1.5–6.9)
ALBUMIN SERPL-MCNC: 3.8 GM/DL (ref 3.5–5)
ALBUMIN/GLOB SERPL: 1.3 RATIO (ref 1.1–2)
ALP SERPL-CCNC: 78 UNIT/L (ref 40–150)
ALT SERPL-CCNC: 9 UNIT/L (ref 0–55)
AST SERPL-CCNC: 14 UNIT/L (ref 5–34)
BASOPHILS # BLD AUTO: 0 X10(3)/MCL (ref 0–0.1)
BASOPHILS NFR BLD AUTO: 1 % (ref 0–1)
BILIRUB SERPL-MCNC: 0.2 MG/DL
BILIRUBIN DIRECT+TOT PNL SERPL-MCNC: 0.1 MG/DL (ref 0–0.5)
BILIRUBIN DIRECT+TOT PNL SERPL-MCNC: 0.1 MG/DL (ref 0–0.8)
BUN SERPL-MCNC: 16 MG/DL (ref 9.8–20.1)
CALCIUM SERPL-MCNC: 9.3 MG/DL (ref 8.4–10.2)
CHLORIDE SERPL-SCNC: 104 MMOL/L (ref 98–107)
CO2 SERPL-SCNC: 31 MMOL/L (ref 22–29)
CREAT SERPL-MCNC: 1.15 MG/DL (ref 0.55–1.02)
EOSINOPHIL # BLD AUTO: 0.2 X10(3)/MCL (ref 0–0.6)
EOSINOPHIL NFR BLD AUTO: 3 % (ref 0–5)
ERYTHROCYTE [DISTWIDTH] IN BLOOD BY AUTOMATED COUNT: 12.2 % (ref 11.5–17)
GLOBULIN SER-MCNC: 2.9 GM/DL (ref 2.4–3.5)
GLUCOSE SERPL-MCNC: 78 MG/DL (ref 74–100)
HCT VFR BLD AUTO: 40.2 % (ref 36–48)
HGB BLD-MCNC: 13.1 GM/DL (ref 12–16)
IMM GRANULOCYTES # BLD AUTO: 0.02 10*3/UL (ref 0–0.02)
IMM GRANULOCYTES NFR BLD AUTO: 0.3 % (ref 0–0.43)
LDH SERPL-CCNC: 204 UNIT/L (ref 140–271)
LYMPHOCYTES # BLD AUTO: 1.5 X10(3)/MCL (ref 0.5–4.1)
LYMPHOCYTES NFR BLD AUTO: 25 % (ref 15–40)
MCH RBC QN AUTO: 32 PG (ref 27–34)
MCHC RBC AUTO-ENTMCNC: 33 GM/DL (ref 31–36)
MCV RBC AUTO: 98 FL (ref 80–99)
MONOCYTES # BLD AUTO: 0.5 X10(3)/MCL (ref 0–1.1)
MONOCYTES NFR BLD AUTO: 9 % (ref 4–12)
NEUTROPHILS # BLD AUTO: 3.6 X10(3)/MCL (ref 1.5–6.9)
NEUTROPHILS NFR BLD AUTO: 62 % (ref 43–75)
PLATELET # BLD AUTO: 187 X10(3)/MCL (ref 140–400)
PMV BLD AUTO: 10.2 FL (ref 6.8–10)
POTASSIUM SERPL-SCNC: 3.9 MMOL/L (ref 3.5–5.1)
PROT SERPL-MCNC: 6.7 GM/DL (ref 6.4–8.3)
RBC # BLD AUTO: 4.08 X10(6)/MCL (ref 4.2–5.4)
SODIUM SERPL-SCNC: 142 MMOL/L (ref 136–145)
WBC # SPEC AUTO: 5.9 X10(3)/MCL (ref 4.5–11.5)

## 2021-06-09 ENCOUNTER — HISTORICAL (OUTPATIENT)
Dept: INFUSION THERAPY | Facility: HOSPITAL | Age: 51
End: 2021-06-09

## 2021-08-04 ENCOUNTER — HISTORICAL (OUTPATIENT)
Dept: INFUSION THERAPY | Facility: HOSPITAL | Age: 51
End: 2021-08-04

## 2021-09-29 ENCOUNTER — HISTORICAL (OUTPATIENT)
Dept: INFUSION THERAPY | Facility: HOSPITAL | Age: 51
End: 2021-09-29

## 2021-12-08 ENCOUNTER — HISTORICAL (OUTPATIENT)
Dept: LAB | Facility: HOSPITAL | Age: 51
End: 2021-12-08

## 2021-12-08 LAB
ABS NEUT (OLG): 3.5 X10(3)/MCL (ref 1.5–6.9)
ALBUMIN SERPL-MCNC: 4 GM/DL (ref 3.5–5)
ALBUMIN/GLOB SERPL: 1.4 RATIO (ref 1.1–2)
ALP SERPL-CCNC: 74 UNIT/L (ref 40–150)
ALT SERPL-CCNC: 10 UNIT/L (ref 0–55)
AST SERPL-CCNC: 20 UNIT/L (ref 5–34)
BASOPHILS # BLD AUTO: 0 X10(3)/MCL (ref 0–0.1)
BASOPHILS NFR BLD AUTO: 0 % (ref 0–1)
BILIRUB SERPL-MCNC: 0.6 MG/DL
BILIRUBIN DIRECT+TOT PNL SERPL-MCNC: 0.3 MG/DL (ref 0–0.5)
BILIRUBIN DIRECT+TOT PNL SERPL-MCNC: 0.3 MG/DL (ref 0–0.8)
BUN SERPL-MCNC: 17 MG/DL (ref 9.8–20.1)
CALCIUM SERPL-MCNC: 9.7 MG/DL (ref 8.7–10.5)
CHLORIDE SERPL-SCNC: 102 MMOL/L (ref 98–107)
CO2 SERPL-SCNC: 32 MMOL/L (ref 22–29)
CREAT SERPL-MCNC: 1.06 MG/DL (ref 0.55–1.02)
EOSINOPHIL # BLD AUTO: 0.1 X10(3)/MCL (ref 0–0.6)
EOSINOPHIL NFR BLD AUTO: 2 % (ref 0–5)
ERYTHROCYTE [DISTWIDTH] IN BLOOD BY AUTOMATED COUNT: 11.5 % (ref 11.5–17)
GLOBULIN SER-MCNC: 2.8 GM/DL (ref 2.4–3.5)
GLUCOSE SERPL-MCNC: 83 MG/DL (ref 74–100)
HCT VFR BLD AUTO: 38 % (ref 36–48)
HGB BLD-MCNC: 12.5 GM/DL (ref 12–16)
IMM GRANULOCYTES # BLD AUTO: 0.01 10*3/UL (ref 0–0.02)
IMM GRANULOCYTES NFR BLD AUTO: 0.2 % (ref 0–0.43)
LDH SERPL-CCNC: 193 UNIT/L (ref 140–271)
LYMPHOCYTES # BLD AUTO: 1.6 X10(3)/MCL (ref 0.5–4.1)
LYMPHOCYTES NFR BLD AUTO: 28 % (ref 15–40)
MCH RBC QN AUTO: 32 PG (ref 27–34)
MCHC RBC AUTO-ENTMCNC: 33 GM/DL (ref 31–36)
MCV RBC AUTO: 96 FL (ref 80–99)
MONOCYTES # BLD AUTO: 0.3 X10(3)/MCL (ref 0–1.1)
MONOCYTES NFR BLD AUTO: 6 % (ref 4–12)
NEUTROPHILS # BLD AUTO: 3.5 X10(3)/MCL (ref 1.5–6.9)
NEUTROPHILS NFR BLD AUTO: 63 % (ref 43–75)
PLATELET # BLD AUTO: 173 X10(3)/MCL (ref 140–400)
PMV BLD AUTO: 10.5 FL (ref 6.8–10)
POTASSIUM SERPL-SCNC: 4.7 MMOL/L (ref 3.5–5.1)
PROT SERPL-MCNC: 6.8 GM/DL (ref 6.4–8.3)
RBC # BLD AUTO: 3.94 X10(6)/MCL (ref 4.2–5.4)
SODIUM SERPL-SCNC: 141 MMOL/L (ref 136–145)
WBC # SPEC AUTO: 5.5 X10(3)/MCL (ref 4.5–11.5)

## 2021-12-15 ENCOUNTER — HISTORICAL (OUTPATIENT)
Dept: INFUSION THERAPY | Facility: HOSPITAL | Age: 51
End: 2021-12-15

## 2022-01-04 ENCOUNTER — HISTORICAL (OUTPATIENT)
Dept: RADIOLOGY | Facility: HOSPITAL | Age: 52
End: 2022-01-04

## 2022-02-07 ENCOUNTER — HISTORICAL (OUTPATIENT)
Dept: ADMINISTRATIVE | Facility: HOSPITAL | Age: 52
End: 2022-02-07

## 2022-02-07 ENCOUNTER — HISTORICAL (OUTPATIENT)
Dept: RADIOLOGY | Facility: HOSPITAL | Age: 52
End: 2022-02-07

## 2022-02-09 ENCOUNTER — HISTORICAL (OUTPATIENT)
Dept: INFUSION THERAPY | Facility: HOSPITAL | Age: 52
End: 2022-02-09

## 2022-03-07 ENCOUNTER — HISTORICAL (OUTPATIENT)
Dept: ADMINISTRATIVE | Facility: HOSPITAL | Age: 52
End: 2022-03-07

## 2022-04-06 ENCOUNTER — HISTORICAL (OUTPATIENT)
Dept: INFUSION THERAPY | Facility: HOSPITAL | Age: 52
End: 2022-04-06

## 2022-04-09 ENCOUNTER — HISTORICAL (OUTPATIENT)
Dept: ADMINISTRATIVE | Facility: HOSPITAL | Age: 52
End: 2022-04-09
Payer: MEDICAID

## 2022-04-27 VITALS
SYSTOLIC BLOOD PRESSURE: 121 MMHG | HEIGHT: 63 IN | OXYGEN SATURATION: 99 % | BODY MASS INDEX: 33.2 KG/M2 | WEIGHT: 187.38 LBS | DIASTOLIC BLOOD PRESSURE: 64 MMHG

## 2022-04-29 NOTE — ED PROVIDER NOTES
Patient:   Carlota Oakley            MRN: [id number]            FIN: [id number]               Age:   48 years     Sex:  Female     :  1970   Associated Diagnoses:   Syncope   Author:   Odalis Melton      Basic Information   Time seen: Date & time 2018 21:35:00.   History source: Patient.   Arrival mode: Private vehicle, walking.   History limitation: None.   Additional information: Chief Complaint from Nursing Triage Note : Chief Complaint   2018 21:30 CDT      Chief Complaint           Feeling lightheaded and woke up on floor.  Started chemo 3 weeks ago.  .      History of Present Illness   The patient presents with syncope and The patient reports getting dizzy at home and passing out, states she had what equates to a full syncopal episode, it is unknown how long that she was on the ground, she states that she woke up and came here..  The onset was just prior to arrival.  The course/duration of symptoms is constant.  The location where the incident occurred was at home.  The exacerbating factor is none.  The relieving factor is none.  Risk factors consist of hypertension.  Prior episodes: none.  Therapy today: none.  Preceding symptoms: unknown.  Associated symptoms: dizziness.  Associated injury to the none.        Review of Systems   Constitutional symptoms:  Negative except as documented in HPI.   Skin symptoms:  Negative except as documented in HPI.   Eye symptoms:  Negative except as documented in HPI.   ENMT symptoms:  Negative except as documented in HPI.   Respiratory symptoms:  Negative except as documented in HPI.   Cardiovascular symptoms:  Negative except as documented in HPI.   Gastrointestinal symptoms:  Negative except as documented in HPI.   Genitourinary symptoms:  Negative except as documented in HPI.   Musculoskeletal symptoms:  Negative except as documented in HPI.   Neurologic symptoms:  Negative except as documented in HPI.   Psychiatric symptoms:   Negative except as documented in HPI.   Endocrine symptoms:  Negative except as documented in HPI.   Hematologic/Lymphatic symptoms:  Negative except as documented in HPI.   Allergy/immunologic symptoms:  Negative except as documented in HPI.             Additional review of systems information: All other systems reviewed and otherwise negative, All systems reviewed as documented in chart.      Health Status   Allergies:    Allergic Reactions (Selected)  Severity Not Documented  Penicillin- Unkknown.,    Allergies (1) Active Reaction  penicillin unkknown  .   Medications:  (Selected)   Prescriptions  Prescribed  magic mouthwash: magic mouthwash, 15 mL, Oral, QID, PRN mouth sore pain, swish and swallow, # 16 oz, 1 Refill(s), Pharmacy: [id number]  prednisONE 20 mg oral tablet: 100 mg = 5 tab(s), Oral, Daily, X 5 day(s), # 25 tab(s), 12 Refill(s), Pharmacy: [id number]  Documented Medications  Documented  Geodon 40 mg capsule: 40 mg = 1 cap(s), Oral, qPM  LORAZEPAM 0.5MG TABLETS: 0.5 mg = 1 tab(s), Oral, q6hr, PRN PRN anxiety  Lamictal 150 mg tablet: 150 mg = 1 tab(s), Oral, BID  ONDANSETRON ODT 8 MG TBDP: 8 mg = 1 tab(s), Oral, QID  OXYCOD/APAP  TAB 5-325MG: tab(s), Oral, q6hr  Remeron 15 mg tablet: 7.5 mg = 0.5 tab(s), Oral, qPM  Vistaril 25 mg oral capsule: 25 mg = 1 cap(s), Oral, q6hr, PRN PRN anxiety, 0 Refill(s)  hydroCHLOROthiazide 12.5 mg oral capsule: 12.5 mg = 1 cap(s), Oral, qAM, 0 Refill(s).   Immunizations: Up to date.      Past Medical/ Family/ Social History   Medical history:    Active  Lump in right breast ([id number]): Onset on 2/15/2018 at 47 years.  Bipolar disorder ([id number])  Anxiety disorder (015893185)  Resolved  PTSD - Post-traumatic stress disorder (557730700):  Resolved.  Hypertension (88116453):  Resolved.  Diffuse large B-cell lymphoma ([id number]):  Resolved., Reviewed as documented in chart.   Surgical history:    Catheter Insertion Mediport (Right,  Upper, Torso) on 4/12/2018 at 48 Years.  Comments:  4/12/2018 12:55 - Imelda Voss RN  auto-populated from documented surgical case  Bone Marrow Biopsy (None) on 4/5/2018 at 48 Years.  Comments:  4/5/2018 08:48 - Shawnee Soliz RN  auto-populated from documented surgical case  Biopsy of breast ([id number]) on 2/15/2018 at 47 Years.  Comments:  4/5/2018 06:59 - Elaine Oakley LPN  corrina  Lymph node biopsy ([id number]).  Comments:  4/5/2018 06:59 - Elaine Oakley LPN  right    4/3/2018 11:10 - Hyacinth Goode LPN  march 2018, Reviewed as documented in chart.   Family history:    Diabetes mellitus type 2  Mother  Stroke  Grandfather  Healthy adult  Father  Hypertension.  Father  Mother  Unknown cause of morbidity or mortality                                                                                                                                                                                                 07-JUN-2017 17:45:38<$>  Mother  , Reviewed as documented in chart.   Social history:    Social & Psychosocial Habits    Alcohol  02/15/2018  Use: Never    Employment/School  04/04/2018  Status: Employed    Description: [employer]    Home/Environment  04/04/2018  Lives with: Alone    Living situation: Home/Independent    Sexual  04/04/2018  Self described orientation: Heterosexual    Substance Abuse  02/15/2018  Use: Current    Type: Marijuana    Frequency: 1-2 times per week    Tobacco  02/15/2018  Use: Current every day smoker    Type: Cigarettes    Tobacco use per day: 15  , Alcohol use: Denies, Tobacco use: Regularly, Drug use: Marijuana, Occupation: Employed.   Problem list:    Active Problems (9)  Anxiety disorder   Bipolar disorder   Chemotherapy induced neutropenia   DLBCL (diffuse large B cell lymphoma)   Left breast mass   Lump in right breast   Mucositis due to chemotherapy   Obesity   Tobacco user   .      Physical Examination               Vital Signs   Vital Signs    5/12/2018 21:30 CDT      Temperature Temporal Artery               36.3 DegC                             Peripheral Pulse Rate     73 bpm                             Respiratory Rate          16 br/min                             SpO2                      98 %                             Oxygen Therapy            Room air                             Systolic Blood Pressure   122 mmHg                             Diastolic Blood Pressure  63 mmHg  .      Vital Signs (last 24 hrs)_____  Last Charted___________  Heart Rate Peripheral   73 bpm  (MAY 12 21:30)  Resp Rate         16 br/min  (MAY 12 21:30)  SBP      122 mmHg  (MAY 12 21:30)  DBP      63 mmHg  (MAY 12 21:30)  SpO2      98 %  (MAY 12 21:30)  Weight      87 kg  (MAY 12 21:30)  Height      163 cm  (MAY 12 21:30)  BMI      32.74  (MAY 12 21:30)  .   Measurements   5/12/2018 21:30 CDT      Weight Dosing             87 kg                             Weight Measured           87 kg                             Weight Measured and Calculated in Lbs     191.80 lb                             Height/Length Dosing      163 cm                             Height/Length Measured    163 cm                             Body Mass Index Measured  32.74 kg/m2  .   Basic Oxygen Information   5/12/2018 21:30 CDT      SpO2                      98 %                             Oxygen Therapy            Room air  .   General:  Alert.   [place] coma scale:  Total score: Total score: 15.   Neurological:  Alert and oriented to person, place, time, and situation, normal sensory observed, normal motor observed, normal speech observed, normal coordination observed.    Skin:  Warm, dry, pink, intact, pale.    Eye   Cardiovascular:  Regular rate and rhythm, No murmur, Normal peripheral perfusion.    Respiratory:  Lungs are clear to auscultation, respirations are non-labored, breath sounds are equal.    Chest wall   Gastrointestinal:  Soft, Nontender, Non distended.    Genitourinary  "  Lymphatics   Psychiatric:  Cooperative, appropriate mood & affect, normal judgment, non-suicidal.       Medical Decision Making   Differential Diagnosis:  Syncope, vasovagal episode, hypoglycemia.    Documents reviewed:  Emergency department nurses' notes.   Orders  Launch Order Profile (Selected)   Inpatient Orders  Ordered  Fall Risk Protocol: 05/12/18 22:53:21 CDT, Constant Order  K-Dur 20 oral tablet, extended release: 20 mEq, form: Tab-ER, Oral, Once, first dose 05/12/18 23:23:00 CDT, stop date 05/12/18 23:23:00 CDT, NOW  [id number] 1,000 mL + Multivitamin (MVI) additive 10 mL Daily + thiamine additive for infusion 100 mg D...: 1,000 mL, 1,000 mL, IV, 1,000 mL/hr, start date 05/12/18 23:40:00 CDT, "Banana Bag"  Ordered (Dispatched)  Urinalysis with Microscopic if Indicated: Stat collect, Urine, 05/12/18 23:28:00 CDT, Stop date 05/12/18 23:28:00 CDT, Nurse collect, 05/12/18 23:28:00 CDT  Canceled  CT Abdomen and Pelvis W W/O Contrast: Routine, 04/04/18 8:00:00 CDT, md ofc will call back with code, None, Ambulatory, oral iv and rectal, Rad Type, Oral Contrast, Schedule this test, [facility], 04/04/18 8:00:00 CDT  CT Thorax W W/O Contrast: Routine, 04/04/18 8:00:00 CDT, md ofc will call back with code, None, Ambulatory, oral, iv, and rectal, Rad Type, Schedule this test, [facility], 04/04/18 8:00:00 CDT  Infuse First Dose Per package insert, 2nd and subsequent doses may be infused as a rapid infusion i...: *Est. 04/24/18 8:38:00 CDT, Once, 1, dose(s), Infuse First Dose Per package insert, 2nd and subsequent doses may be infused as a rapid infusion if first dose tolerated well., Days 1, *Est. 04/24/18 8:38:00 CDT  Completed  Automated Diff: Now collect, 05/12/18 22:13:00 CDT, Blood, Collected, Stop date 05/12/18 22:13:00 CDT, Lab Collect, 05/12/18 22:09:00 CDT  CBC w/ Auto Diff: Now collect, 05/12/18 22:09:00 CDT, Blood, Stop date 05/12/18 22:10:00 CDT, Lab Collect, 05/12/18 22:09:00 CDT  CK " MB: Stat collect, 18 22:09:00 CDT, Blood, Stop date 18 22:10:00 CDT, Lab Collect, 18 22:09:00 CDT  CK: Stat collect, 18 22:09:00 CDT, Blood, Stop date 18 22:10:00 CDT, Lab Collect, 18 22:09:00 CDT  CMP: Now collect, 18 22:09:00 CDT, Blood, Once, Stop date 18 22:10:00 CDT, Lab Collect, 18 22:09:00 CDT  EK18 22:09:00 CDT, Stat, Once, 18 22:09:00 CDT, Ambulatory, Patient Has IV, Standard Precautions, -1, -1, 18 22:09:00 CDT  Estimated Glomerular Filtration Rate: Now collect, 18 22:13:00 CDT, Blood, Collected, Once, Stop date 18 22:13:00 CDT, Lab Collect, 18 22:09:00 CDT  Magnesium Level: Stat collect, 18 22:09:00 CDT, Blood, Stop date 18 22:10:00 CDT, Lab Collect, 18 22:09:00 CDT  Troponin-I: Stat collect, 18 22:09:00 CDT, Blood, Stop date 18 22:10:00 CDT, Lab Collect, 18 22:09:00 CDT  Deleted  US Arterial Upper Extremity Left: Routine, *Est. 18 11:29:00 CDT, Arm/Shoulder Pain, Left Arm pit pain, None, Ambulatory, SCHEDULE AT [facility], Rad Type, Order for future visit, Left breast mass  DLBCL (diffuse large B cell lymphoma), Schedule this test, Outside facility, *Est. 04/....   Electrocardiogram:  Time 2018 22:22:00, rate 68, normal sinus rhythm.    Results review:  Lab results : Lab [facility]   2018 22:13 CDT      Sodium Lvl                137 mmol/L                             Potassium Lvl             3.3 mmol/L  LOW                             Chloride                  99 mmol/L  LOW                             CO2                       29 mmol/L                             Calcium Lvl               8.6 mg/dL                             Magnesium Lvl             2.2 mg/dL                             Glucose Lvl               121 mg/dL  HI                             BUN                       17 mg/dL                             Creatinine                1.22 mg/dL                              eGFR-AA                   60 mL/min/1.73 m2  NA                             eGFR-JESSICA                  50 mL/min/1.73 m2  NA                             Bili Total                0.2 mg/dL                             Bili Direct               0.10 mg/dL                             Bili Indirect             0.10 mg/dL  NA                             AST                       15 unit/L                             ALT                       23 unit/L                             Alk Phos                  72 unit/L                             Total Protein             7.4 gm/dL                             Albumin Lvl               3.5 gm/dL                             Globulin                  3.90 gm/dL  NA                             A/G Ratio                 0.9 ratio  NA                             Total CK                  46 unit/L                             CK MB                     <0.5 ng/mL                             Troponin-I                <0.017 ng/mL                             WBC                       10.0 x10(3)/mcL                             RBC                       3.79 x10(6)/mcL  LOW                             Hgb                       11.7 gm/dL  LOW                             Hct                       35.1 %  LOW                             Platelet                  364 x10(3)/mcL                             MCV                       93 fL                             MCH                       31 pg                             MCHC                      33 gm/dL                             RDW                       12.5 %                             MPV                       9.9 fL                             Abs Neut                  6.9 x10(3)/mcL                             Neutro Auto               68 %                             Lymph Auto                22.50 %                             Mono Auto                 8 %                             Eos Auto                  0 %                              Abs Eos                   0.0 x10(3)/mcL                             Basophil Auto             1 %                             Abs Neutro                6.9 x10(3)/mcL                             Abs Lymph                 2.3 x10(3)/mcL                             Abs Mono                  0.8 x10(3)/mcL                             Abs Baso                  0.1 x10(3)/mcL  ,    No qualifying data available.    Notes:  Discussed the results with the patient, she does state that she did have one episode like this some years ago and she was told it was a hypoglycemic episode, she states that she denies she passed out though, her blood sugar was found to be slightly elevated and her chemistry but she did state that she drinks some Coca-Cola prior to arrival.  She does not have any pain.  She was given a banana bag .  Discussed her case with Dr. Ritchie and he feels that she should be admitted for observation, he will call Dr. Schwartz discuss her case. Discussed the results with the patient, she does state that she did have one episode like this some years ago and she was told it was a hypoglycemic episode, she states that she denies she passed out though, her blood sugar was found to be slightly elevated and her chemistry but she did state that she drinks some Coca-Cola prior to arrival.  She does not have any pain.  She was given a banana bag and replace some of the knee transient from the chemotherapy to combat some of her anemia.  Discussed her case with Dr. Ritchie and he feels that she should be admitted for observation, he will call Dr. Schwartz discuss her case.  Discussed the results with the patient, she does state that she did have one episode like this some years ago and she was told it was a hypoglycemic episode, she states that she denies she passed out though, her blood sugar was found to be slightly elevated and her chemistry but she did state that she drinks some Coca-Cola prior to  arrival.  She does not have any pain.  She was given a banana bag and replace some of the knee transient from the chemotherapy to combat some of her anemia.  Discussed her case with Dr. Ritchie and he feels that she should be admitted for observation, he will call Dr. Schwartz discuss her case. .      Reexamination/ Reevaluation   Time: 5/13/2018 00:25:00 .   Vital signs   results included from flowsheet : Vital Signs   5/12/2018 23:07 CDT      Temperature Temporal Artery               36.7 DegC                             Peripheral Pulse Rate     70 bpm                             Respiratory Rate          18 br/min                             SpO2                      98 %                             Oxygen Therapy            Room air                             Systolic Blood Pressure   103 mmHg                             Diastolic Blood Pressure  67 mmHg                             Mean Arterial Pressure, Cuff              79 mmHg     Basic Oxygen Information   5/12/2018 21:30 CDT      SpO2                      98 %                             Oxygen Therapy            Room air     Notes: I am Dr. Ritchie, I assume the care of patient.  After initial workup was started by Odalis Wilder NP    Patient presented with syncopal episode, normal EKG, normal cardiac enzymes, she does have mild anemia after getting chemotherapy for her lymphoma.      Awake, alert, no distress.  Heart: S1, S2 are audible.  There is no S3 no S4, no murmurs.  Chest is clear to auscultation.  No rales, no rhonchi.  Abdomen is soft, nondistended, nontender.  Bowel sounds are audible.  CNS: No focal deficit  Capillary refill is less than 2 seconds.  Peripheral pulses are palpable bilaterally symmetrical      Essentially, we will have to admit the patient for monitoring on telemetry to determine if she has a cardiac dysrhythmia as a cause of her syncopal episode.  I discussed the case with Dr. Yee and he agrees with admission..       Impression and Plan   Diagnosis   Syncope ([id number])      Calls-Consults   -  5/13/2018 00:20:00 .   Plan   Disposition: Patient care transitioned to: Time: 5/12/2018 23:40:00, [facility]    Orders: Launch Orders   Admit/Transfer/Discharge:  Admit to Outpatient Observation (Order): 5/13/2018 0:20 CDT, Juan Yee MD Telemetry with Monitor, No, Syncopal episode, suspect cardiac dysrhythmia.  .

## 2022-04-29 NOTE — DISCHARGE SUMMARY
Patient:   Carlota Oakley            MRN: [id number]            FIN: [id number]               Age:   48 years     Sex:  Female     :  1970   Associated Diagnoses:   Bipolar disorder; DLBCL (diffuse large B cell lymphoma); Tobacco user; Syncope; Syncope/Near syncope   Author:   Win James MD      Results Review   General results   Most recent results   Discrete results only   2018 4:10 CDT       WBC                       6.9 x10(3)/mcL                             RBC                       3.51 x10(6)/mcL  LOW                             Hgb                       11.0 gm/dL  LOW                             Hct                       33.1 %  LOW                             Platelet                  326 x10(3)/mcL                             MCV                       94 fL                             MCH                       31 pg                             MCHC                      33 gm/dL                             RDW                       12.6 %                             MPV                       9.8 fL                             Abs Neut                  3.8 x10(3)/mcL                             Neutro Auto               56 %                             Lymph Auto                35.40 %                             Mono Auto                 8 %                             Eos Auto                  0 %                             Abs Eos                   0.0 x10(3)/mcL                             Basophil Auto             1 %                             Abs Neutro                3.8 x10(3)/mcL                             Abs Lymph                 2.4 x10(3)/mcL                             Abs Mono                  0.5 x10(3)/mcL                             Abs Baso                  0.1 x10(3)/mcL                             Sodium Lvl                142 mmol/L                             Potassium Lvl             3.6 mmol/L                             Chloride                   107 mmol/L                             CO2                       26 mmol/L                             Calcium Lvl               8.1 mg/dL                             Glucose Lvl               85 mg/dL                             BUN                       15 mg/dL                             Creatinine                1.01 mg/dL                             BUN/Creat Ratio           15  NA                             eGFR-AA                   >60 mL/min/1.73 m2  NA                             eGFR-JESSICA                  >60 mL/min/1.73 m2  NA           Discharge Information      Discharge Summary Information   Admitted  5/12/2018   Discharged  5/13/2018   Admitting physician     Juan Yee MD.     Discharge diagnosis     Bipolar disorder (LMS52-WV F31.9).     DLBCL (diffuse large B cell lymphoma) (JQI52-BA C83.30).     Tobacco user (CXB74-BW Z72.0).     Discharge medications     OTHER MEDICATIONS (Selected)   Prescriptions  Prescribed  magic mouthwash: magic mouthwash, 15 mL, Oral, QID, PRN mouth sore pain, swish and swallow, # 16 oz, 1 Refill(s), Pharmacy: [facility] [id number]  Documented Medications  Documented  Geodon 40 mg capsule: 40 mg = 1 cap(s), Oral, qPM  LORAZEPAM 0.5MG TABLETS: 0.5 mg = 1 tab(s), Oral, q6hr, PRN PRN anxiety  Lamictal 150 mg tablet: 150 mg = 1 tab(s), Oral, BID  ONDANSETRON ODT 8 MG TBDP: 8 mg = 1 tab(s), Oral, QID  Remeron 15 mg tablet: 7.5 mg = 0.5 tab(s), Oral, qPM  Vistaril 25 mg oral capsule: 25 mg = 1 cap(s), Oral, q6hr, PRN PRN anxiety, 0 Refill(s)  aspirin 81 mg oral tablet, CHEWABLE: 324 mg = 4 tab(s), Oral, Daily, 0 Refill(s)  hydroCHLOROthiazide 12.5 mg oral capsule: 12.5 mg = 1 cap(s), Oral, qAM, 0 Refill(s).        Physical Examination   General:  Alert and oriented, No acute distress.    Eye:  Pupils are equal, round and reactive to light, Extraocular movements are intact, Normal conjunctiva.    HENT:  Normocephalic, Tympanic membranes are clear.    Neck:  Supple,  Non-tender, No carotid bruit, No jugular venous distention, No lymphadenopathy, No thyromegaly.    Respiratory:  Lungs are clear to auscultation, Breath sounds are equal, Symmetrical chest wall expansion, No chest wall tenderness.    Cardiovascular:  Normal rate, Regular rhythm, No murmur, No gallop, No edema.    Gastrointestinal:  Soft, Non-tender, Non-distended, Normal bowel sounds, No organomegaly.    Genitourinary:  No costovertebral angle tenderness.    Vital Signs   5/14/2018 7:00 CDT       Temperature Oral          36.4 DegC  (Modified)                            Temperature Oral (calculated)             97.52 DegF  (Modified)                            Heart Rate Monitored      72 bpm  (Modified)                            Respiratory Rate          16 br/min                             SpO2                      100 %  (Modified)                            Systolic Blood Pressure   136 mmHg  (Modified)                            Diastolic Blood Pressure  80 mmHg  (Modified)   5/14/2018 3:59 CDT       24 HR Intake Totals       240 mL                             24 HR Output Totals       1,250 mL                             24 HR I&O Balance         -1,010 mL    5/14/2018 3:00 CDT       Temperature Temporal Artery               36.3 DegC                             Heart Rate Monitored      64 bpm                             Respiratory Rate          18 br/min                             SpO2                      95 %                             Systolic Blood Pressure   117 mmHg                             Diastolic Blood Pressure  68 mmHg                             Mean Arterial Pressure, Cuff              84 mmHg    5/13/2018 23:00 CDT      Temperature Temporal Artery               36.4 DegC                             Heart Rate Monitored      68 bpm                             Respiratory Rate          18 br/min                             SpO2                      96 %                              Systolic Blood Pressure   119 mmHg                             Diastolic Blood Pressure  70 mmHg    5/13/2018 19:00 CDT      Temperature Temporal Artery               36.6 DegC                             Heart Rate Monitored      65 bpm                             Respiratory Rate          18 br/min                             SpO2                      97 %                             Systolic Blood Pressure   116 mmHg                             Diastolic Blood Pressure  71 mmHg    5/13/2018 15:00 CDT      Temperature Temporal Artery               36.5 DegC                             Heart Rate Monitored      69 bpm                             Respiratory Rate          18 br/min                             SpO2                      100 %                             Systolic Blood Pressure   116 mmHg                             Diastolic Blood Pressure  68 mmHg    5/13/2018 11:00 CDT      Temperature Temporal Artery               36.6 DegC                             Heart Rate Monitored      71 bpm                             Respiratory Rate          18 br/min                             SpO2                      100 %                             Systolic Blood Pressure   120 mmHg                             Diastolic Blood Pressure  67 mmHg    5/13/2018 7:00 CDT       Temperature Temporal Artery               36.3 DegC                             Heart Rate Monitored      66 bpm                             Respiratory Rate          18 br/min                             SpO2                      100 %                             Systolic Blood Pressure   118 mmHg                             Diastolic Blood Pressure  70 mmHg    5/13/2018 3:00 CDT       Temperature Temporal Artery               36.1 DegC  LOW                             Heart Rate Monitored      64 bpm                             Respiratory Rate          18 br/min                             SpO2                      92 %  LOW                              Systolic Blood Pressure   100 mmHg                             Diastolic Blood Pressure  59 mmHg  LOW    5/13/2018 1:58 CDT       Temperature Temporal Artery               36.2 DegC  LOW                             Peripheral Pulse Rate     75 bpm                             Respiratory Rate          20 br/min                             Systolic Blood Pressure   113 mmHg                             Diastolic Blood Pressure  69 mmHg    5/13/2018 1:00 CDT       Oxygen Therapy            Room air    5/13/2018 0:36 CDT       Temperature Temporal Artery               36.8 DegC                             Peripheral Pulse Rate     72 bpm                             Respiratory Rate          18 br/min                             SpO2                      99 %                             Oxygen Therapy            Room air                             Systolic Blood Pressure   110 mmHg                             Diastolic Blood Pressure  68 mmHg        Vital Signs (last 24 hrs)_____  Last Charted___________  Temp Oral     36.4 DegC  (MAY 14 07:00)  Resp Rate         16 br/min  (MAY 14 07:00)  SBP      136 mmHg  (MAY 14 07:00)  DBP      80 mmHg  (MAY 14 07:00)  SpO2      100 %  (MAY 14 07:00)     Lymphatics:  No lymphadenopathy neck, axilla, groin.    Musculoskeletal:  Normal range of motion, Normal strength, No swelling.    Integumentary:  Warm.    Neurologic:  Alert, Oriented, Normal motor function, No focal deficits, Cranial Nerves II-XII are grossly intact, Normal deep tendon reflexes.    Psychiatric:  Cooperative, Non-suicidal.       Hospital Course   Hospital Course   Admitted from: from emergency department.     Transferred via: by car.     Admitting diagnosis: Bipolar disorder (ZIA40-DN F31.9), DLBCL (diffuse large B cell lymphoma) (YYQ12-CZ C83.30), Syncope (MLX45-OT R55), Syncope/Near syncope (PNED [id number]), Tobacco user (GYT76-ZT Z72.0).     Admission disposition: admit to  medical bed.       48 year old white female with a HTN, Bipolar and lymphoma, recently completed a round of chemotherapy, presents after she passed out.  Patient was sitting on the sofa when she stood she passed out.  She noted just prior to passing out she felt lightheaded, her legs felt weak, and she had a cold sweat.   This was an isolated epiosde.  She was in her usual health previous to this episode.  She has no history cardiac disease.  She does have HTN, and takes HCTZ.  On presentation her blood pressure in the low-normal range.  No orthostatics in ED.  Lab work unremarkable.   Pt sx imprived after IVF  There was no acute event since admission   Pt was seen and examined at bedside . She was determined to be suitable for d/c   D/C summary time : 35 min       Discharge Plan   Discharge Summary Plan   Discharge Status: improved.     Discharge instructions given: to patient.     Discharge disposition: discharge to home (into the care of family member, self care).     Prescriptions: continue same medications, reviewed with patient, written and given to patient.     Education and Follow-up   Counseled: patient, regarding diagnosis, regarding treatment, regarding medications.     Discharge Planning: Syncope, Easy-to-Read, Report to Emergency Department if symptoms return or worsen; Anytime the conditions worsen, return to clinic or go to ED; Sherrie Green NP In 1 week.

## 2022-04-29 NOTE — OP NOTE
PREOPERATIVE DIAGNOSES:  Microscopic hematuria, renal cyst.    POSTOPERATIVE DIAGNOSES:  Microscopic hematuria, renal cyst.    ANESTHESIA:  IV sedation and local.    PROCEDURE:  Cystoscopy.    DESCRIPTION OF PROCEDURE:  After operative consent, patient was brought to the operating room, placed on the table in supine position.  IV sedation induced.  Patient converted to dorsal lithotomy position.  Genital area prepped and draped in usual sterile fashion.  After adequate local anesthesia with 2% plain lidocaine jelly intraurethrally, the 22-[place] citizen cystoscope was advanced per urethra into the bladder under direct vision.  Bladder demonstrated diffuse mild trabeculation.  No foreign bodies or tumors identified.  Ureteral orifices normal size, shape, and position with clear effluent bilaterally.  At this time, the bladder was drained.  The cystoscope was removed.  Pelvic examination revealed no significant pelvic masses.  She had a mild cystocele.  Rectal examination unremarkable.  The patient was returned to her room in good condition.    LABORATORY DATA:  Revealed a creatinine of 1.1.  Her electrolytes were within normal limits.  PT was 13.2 and INR 1.  White blood cell count was 5.4, hematocrit 38.8.    PLAN:  Follow up in the office in a month with a repeat urinalysis.        RUPAL/JACQUELINE   DD: 05/20/2021 0848   DT: 05/20/2021 0903  Job # [id number]    cc: Monica Eldridge

## 2022-04-29 NOTE — OP NOTE
ADMITTING DIAGNOSIS:  B-cell lymphoma with a dysfunctional MediPort.    PROCEDURE:  Re-evaluation of MediPort with resection of a kinked segment of catheter that was causing her MediPort dysfunction.    BRIEF HISTORY:  The patient is a 48-year-old  female, whose primary care provider is Ms. __________ The patient was seen by Dr. Yi West and was noted to have a B-cell lymphoma on biopsy that required the patient to have a MediPort placed for chemotherapy and 6 weeks later, developed a kink in the line that required removal of the line and reattachment to the actual MediPort itself.  Initially, I tried to aspirate on the MediPort with a Patiño needle and was unsuccessful.  The C-arm showed the MediPort catheter to be kinked, at the attachment to the MediPort itself, the patient had the line and kink.  The MediPort was checked and good blood return was noted.  The MediPort detachment port from the capsule and then replaced it back in the capsule and reattached it.  The line kinked again and it was because the line was too long and as a result, I went ahead and took a segment of the line out and reattached it to the MediPort and secured it.  Once it was reattached and secured, I checked a MediPort and it functioned appropriately.  It is my opinion that the MediPort function is related to the kink in the catheter itself going to the right IJ.  The catheter was in the right place in the right IJ, but was kinked at its anastomosis to the MediPort itself and I believe this is secondary to the fact that the catheter was a little bit long.  The patient, upon removal of the kink segment, the remainder of the catheter functioned appropriately and the MediPort functioned appropriately.  The capsule and the subcu were closed with 3-0 Vicryl and skin was closed with 4-0 plain gut suture.  Dermabond was used for the skin.  Sterile dressings were applied.  No problems were encountered.  The patient tolerated  the procedure well.       I appreciate the consultation referral from Dr. West and Ms. Mathias.        ANASTASIYA/JACQUELINE   DD: 05/31/2018 1356   DT: 05/31/2018 1429  Job # [id number]    cc: Yi West   __________ [place]

## 2022-04-29 NOTE — H&P
Patient:   Carlota Oakley            MRN: [id number]            FIN: [id number]               Age:   48 years     Sex:  Female     :  1970   Associated Diagnoses:   Bipolar disorder; DLBCL (diffuse large B cell lymphoma); Syncope; Syncope/Near syncope; Tobacco user   Author:   Win James MD      Basic Information   Source of history:  Self, Medical record.    Present at bedside:  Medical personnel.    Referral source:  Emergency department.    History limitation:  None.       Chief Complaint   2018 21:30 CDT      Feeling lightheaded and woke up on floor.  Started chemo 3 weeks ago.        History of Present Illness   48 year old white female with a HTN, Bipolar and lymphoma, recently completed a round of chemotherapy, presents after she passed out.  Patient was sitting on the sofa when she stood she passed out.  She noted just prior to passing out she felt lightheaded, her legs felt weak, and she had a cold sweat.   This was an isolated epiosde.  She was in her usual health previous to this episode.  She has no history cardiac disease.  She does have HTN, and takes HCTZ.  On presentation her blood pressure in the low-normal range.  No orthostatics in ED.  Lab work unremarkable.         Review of Systems   Constitutional:  No fever.    Ear/Nose/Mouth/Throat:  Negative.    Respiratory:  No shortness of breath, No cough, No hemoptysis, No wheezing.    Cardiovascular:  No chest pain, No tachycardia, No peripheral edema.    Gastrointestinal:  No nausea, No vomiting, No diarrhea, No constipation, No heartburn, No abdominal pain, No hematemesis.    Genitourinary:  No dysuria, No hematuria.    Hematology/Lymphatics:  Negative.    Endocrine:  No polyuria, No cold intolerance, No heat intolerance.    Immunologic:  Negative.    Musculoskeletal:  Negative.    Integumentary:  No rash.    Neurologic:  Alert and oriented X4, No confusion, No numbness, No headache.    Psychiatric:  No anxiety,  "No depression.    All other systems are negative      Health Status   Allergies:    Allergic Reactions (Selected)  Severity Not Documented  Penicillin- Unkknown.,    Allergies (1) Active Reaction  penicillin unkknown     Current medications:  (Selected)   Inpatient Medications  Ordered  Lovenox: 40 mg, form: Injection, Subcutaneous, Daily, first dose 05/13/18 9:00:00 CDT, for all risk catagories  [id number] 1,000 mL + Multivitamin (MVI) additive 10 mL Daily + thiamine additive for infusion 100 mg D...: 1,000 mL, 1,000 mL, IV, 1,000 mL/hr, start date 05/12/18 23:40:00 CDT, "Banana Bag"  Zofran: 4 mg, form: Injection, IV Push, q4hr PRN for nausea, first dose 05/13/18 0:49:00 CDT, choose first if ordered with other treatments for nausea  aspirin 81 mg oral tablet, CHEWABLE: 324 mg, form: Tab-Chew, Oral, Daily, first dose 05/13/18 9:00:00 CDT, 4 chew tab = 5 grain ASA  Prescriptions  Prescribed  magic mouthwash: magic mouthwash, 15 mL, Oral, QID, PRN mouth sore pain, swish and swallow, # 16 oz, 1 Refill(s), Pharmacy: [facility] [id number]  prednisONE 20 mg oral tablet: 100 mg = 5 tab(s), Oral, Daily, X 5 day(s), # 25 tab(s), 12 Refill(s), Pharmacy: [facility] [id number]  Documented Medications  Documented  Geodon 40 mg capsule: 40 mg = 1 cap(s), Oral, qPM  LORAZEPAM 0.5MG TABLETS: 0.5 mg = 1 tab(s), Oral, q6hr, PRN PRN anxiety  Lamictal 150 mg tablet: 150 mg = 1 tab(s), Oral, BID  ONDANSETRON ODT 8 MG TBDP: 8 mg = 1 tab(s), Oral, QID  OXYCOD/APAP  TAB 5-325MG: tab(s), Oral, q6hr  Remeron 15 mg tablet: 7.5 mg = 0.5 tab(s), Oral, qPM  Vistaril 25 mg oral capsule: 25 mg = 1 cap(s), Oral, q6hr, PRN PRN anxiety, 0 Refill(s)  hydroCHLOROthiazide 12.5 mg oral capsule: 12.5 mg = 1 cap(s), Oral, qAM, 0 Refill(s)   Problem list:    All Problems  Anxiety disorder / SNOMED CT 712825647 / Confirmed  Bipolar disorder / SNOMED CT 84085237 / Confirmed  Left breast mass / SNOMED CT 871133615 / Confirmed  Chemotherapy induced " neutropenia / SNOMED CT 6256670867 / Confirmed  DLBCL (diffuse large B cell lymphoma) / SNOMED CT 229437500 / Confirmed  Lump in right breast / SNOMED CT 006684484005950 / Confirmed  Mucositis due to chemotherapy / SNOMED CT 894947932 / Confirmed  Obesity / SNOMED CT 3387242831 / Probable  Tobacco user / SNOMED CT 775355158 / Confirmed,    Active Problems (9)  Anxiety disorder   Bipolar disorder   Chemotherapy induced neutropenia   DLBCL (diffuse large B cell lymphoma)   Left breast mass   Lump in right breast   Mucositis due to chemotherapy   Obesity   Tobacco user         Histories   Past Medical History:    Active  Lump in right breast (031876700048183): Onset on 2/15/2018 at 47 years.  Bipolar disorder (73102243)  Anxiety disorder (359624322)  Resolved  PTSD - Post-traumatic stress disorder (412967736):  Resolved.  Hypertension (68025212):  Resolved.  Diffuse large B-cell lymphoma (029040240):  Resolved.   Family History:    Diabetes mellitus type 2  Mother  Stroke  Grandfather  Healthy adult  Father  Hypertension.  Father  Mother  Unknown cause of morbidity or mortality                                                                                                                                                                                                 07-JUN-2017 17:45:38<$>  Mother     Procedure history:    Catheter Insertion Mediport (Right, Upper, Torso) on 4/12/2018 at 48 Years.  Comments:  4/12/2018 12:55 - Imelda Voss RN  auto-populated from documented surgical case  Bone Marrow Biopsy (None) on 4/5/2018 at 48 Years.  Comments:  4/5/2018 08:48 - Shawnee Soliz RN  auto-populated from documented surgical case  Biopsy of breast ([id number]) on 2/15/2018 at 47 Years.  Comments:  4/5/2018 06:59 - Elaine Oakley LPN  corrina  Lymph node biopsy ([id number]).  Comments:  4/5/2018 06:59 - Elaine Oakley LPN  right    4/3/2018 11:10 - Hyacinth Goode LPN  march 2018   Social History         Social & Psychosocial Habits    Alcohol  05/13/2018  Use: Current    Type: Liquor    Frequency: 1-2 times per year    Average drinks per episode in last year: 1    Employment/School  04/04/2018  Status: Employed    Description: [employer]    Home/Environment  04/04/2018  Lives with: Alone    Living situation: Home/Independent    Sexual  04/04/2018  Self described orientation: Heterosexual    Substance Abuse  02/15/2018  Use: Current    Type: Marijuana    Frequency: 1-2 times per week    Tobacco  02/15/2018  Use: Current every day smoker    Type: Cigarettes    Tobacco use per day: 15  .        Physical Examination   General:  Alert and oriented, No acute distress.    Eye:  Pupils are equal, round and reactive to light, Extraocular movements are intact, Normal conjunctiva.    HENT:  Normocephalic, Tympanic membranes are clear.    Neck:  Supple, Non-tender, No carotid bruit, No jugular venous distention, No lymphadenopathy, No thyromegaly.    Respiratory:  Lungs are clear to auscultation, Breath sounds are equal, Symmetrical chest wall expansion, No chest wall tenderness.    Cardiovascular:  Normal rate, Regular rhythm, No murmur, No gallop, No edema.    Gastrointestinal:  Soft, Non-tender, Non-distended, Normal bowel sounds, No organomegaly.    Genitourinary:  No costovertebral angle tenderness.       Vital Signs (last 24 hrs)_____  Last Charted___________  Heart Rate Peripheral   75 bpm  (MAY 13 01:58)  Resp Rate         18 br/min  (MAY 13 15:00)  SBP      116 mmHg  (MAY 13 15:00)  DBP      68 mmHg  (MAY 13 15:00)  SpO2      100 %  (MAY 13 15:00)  Weight      87 kg  (MAY 13 00:54)  Height      163 cm  (MAY 13 00:54)  BMI      32.74  (MAY 13 00:54)     Measurements from flowsheet : Measurements   5/13/2018 0:54 CDT       Weight Dosing             87 kg                             Weight Measured           87 kg                             Weight Measured and Calculated in Lbs     191.80 lb                              Weighing Method           Standing                             Height/Length Dosing      163 cm                             Height/Length Measured    163 cm                             BSA Measured              1.98 m2                             Body Mass Index Measured  32.74 kg/m2                             Ideal Body Weight Calculated              55 kg    5/13/2018 0:23 CDT       Weight Dosing             Not Done: Task Duplication  (Not Done)                            Height/Length Dosing      Not Done: Task Duplication  (Not Done)   5/12/2018 21:30 CDT      Weight Dosing             87 kg                             Weight Measured           87 kg                             Weight Measured and Calculated in Lbs     191.80 lb                             Height/Length Dosing      163 cm                             Height/Length Measured    163 cm                             Body Mass Index Measured  32.74 kg/m2     Lymphatics:  No lymphadenopathy neck, axilla, groin.    Musculoskeletal:  Normal range of motion, Normal strength, No swelling.    Integumentary:  Warm.    Neurologic:  Alert, Oriented, Normal motor function, No focal deficits, Cranial Nerves II-XII are grossly intact, Normal deep tendon reflexes.    Psychiatric:  Cooperative, Non-suicidal.       Review / Management   Results review:     Labs (Last four charted values)  Glucose              H 121 (MAY 12) .    Laboratory Results   Last 5 Days Lab Results : PowerNote Discrete Results   5/13/2018 3:50 CDT       Total CK                  43 unit/L                             CK MB                     <0.5 ng/mL                             Troponin-I                <0.017 ng/mL    5/12/2018 23:37 CDT      UA Appear                 CLEAR                             UA Color                  STRAW                             UA Spec Grav              1.015  NA                             UA Bili                   Negative                              UA pH                     6.0  NA                             UA Urobilinogen           0.2 EU/dL                             UA Blood                  MODERATE                             UA Glucose                Negative                             UA Ketones                Negative                             UA Protein                Negative                             UA Nitrite                Negative                             UA Leuk Est               Negative                             UA WBC                    None Seen                             UA RBC                    0-2 /HPF                             UA Bacteria               Rare /HPF                             UA Squam Epithelial       Few /LPF    5/12/2018 22:13 CDT      WBC                       10.0 x10(3)/mcL                             RBC                       3.79 x10(6)/mcL  LOW                             Hgb                       11.7 gm/dL  LOW                             Hct                       35.1 %  LOW                             Platelet                  364 x10(3)/mcL                             MCV                       93 fL                             MCH                       31 pg                             MCHC                      33 gm/dL                             RDW                       12.5 %                             MPV                       9.9 fL                             Abs Neut                  6.9 x10(3)/mcL                             Neutro Auto               68 %                             Lymph Auto                22.50 %                             Mono Auto                 8 %                             Eos Auto                  0 %                             Abs Eos                   0.0 x10(3)/mcL                             Basophil Auto             1 %                             Abs Neutro                6.9 x10(3)/mcL                             Abs Lymph                 2.3  x10(3)/mcL                             Abs Mono                  0.8 x10(3)/mcL                             Abs Baso                  0.1 x10(3)/mcL                             Sodium Lvl                137 mmol/L                             Potassium Lvl             3.3 mmol/L  LOW                             Chloride                  99 mmol/L  LOW                             CO2                       29 mmol/L                             Calcium Lvl               8.6 mg/dL                             Magnesium Lvl             2.2 mg/dL                             Glucose Lvl               121 mg/dL  HI                             BUN                       17 mg/dL                             Creatinine                1.22 mg/dL                             eGFR-AA                   60 mL/min/1.73 m2  NA                             eGFR-JESSICA                  50 mL/min/1.73 m2  NA                             Bili Total                0.2 mg/dL                             Bili Direct               0.10 mg/dL                             Bili Indirect             0.10 mg/dL  NA                             AST                       15 unit/L                             ALT                       23 unit/L                             Alk Phos                  72 unit/L                             Total Protein             7.4 gm/dL                             Albumin Lvl               3.5 gm/dL                             Globulin                  3.90 gm/dL  NA                             A/G Ratio                 0.9 ratio  NA                             Total CK                  46 unit/L                             CK MB                     <0.5 ng/mL                             Troponin-I                <0.017 ng/mL    5/9/2018 8:12 CDT        WBC                       10.1 x10(3)/mcL                             RBC                       3.98 x10(6)/mcL  LOW                             Hgb                       12.3  gm/dL                             Hct                       37.0 %                             Platelet                  195 x10(3)/mcL                             MCV                       93 fL                             MCH                       31 pg                             MCHC                      33 gm/dL                             RDW                       12.4 %                             MPV                       10.2 fL  HI                             Abs Neut                  6.3 x10(3)/mcL                             Segs Man                  65 %                             Lymph Man                 26 %                             Monocyte Man              9 %                             Eos Man                   0 %                             Basophil Man              0 %                             Platelet Est              Adequate                             RBC Morph                 Normal                             Sodium Lvl                138 mmol/L                             Potassium Lvl             3.5 mmol/L  LOW                             Chloride                  100 mmol/L                             CO2                       30 mmol/L                             Calcium Lvl               8.4 mg/dL                             Glucose Lvl               133 mg/dL  HI                             BUN                       12 mg/dL                             Creatinine                1.14 mg/dL                             eGFR-AA                   >60 mL/min/1.73 m2  NA                             eGFR-JESSICA                  54 mL/min/1.73 m2  NA                             Bili Total                0.2 mg/dL                             Bili Direct               0.10 mg/dL                             Bili Indirect             0.10 mg/dL  NA                             AST                       16 unit/L                             ALT                       21 unit/L                              Alk Phos                  85 unit/L                             Total Protein             7.5 gm/dL                             Albumin Lvl               3.6 gm/dL                             Globulin                  3.90 gm/dL  NA                             A/G Ratio                 0.9 ratio  NA                             LDH                       194 unit/L           Impression and Plan   Diagnosis     Bipolar disorder (XLI00-AT F31.9).     DLBCL (diffuse large B cell lymphoma) (GRC53-UZ C83.30).     Syncope (EIK08-XA R55).     Syncope/Near syncope (PNED [id number]).     Tobacco user (XEU74-FR Z72.0).       cont IVF  cont current tx  monitor for 24 hrs   f/u am labs

## 2022-04-29 NOTE — OP NOTE
ADMITTING DIAGNOSIS:  History of B-cell lymphoma.    PROCEDURE:  MediPort placement right internal jugular using anterior Seldinger technique.    POSTOPERATIVE DIAGNOSIS:  Successful MediPort placement.    PROCEDURE IN DETAIL:  Patient is a 48-year-old with bipolar disorder, hypertension, smokes a pack a day, and had an ultrasound of the neck that showed a nodule in the submandibular gland, area about 2 cm x 1.9 cm.  Patient had CT scan that also showed matted adenopathy in the right submandibular space 4.6 x 2.6 x 2.5 cm.  She had an FNA of the neck that demonstrated a B-cell lymphoma, and as a result was scheduled for MediPort placement for further treatment.  Primary care provider is Ms. Meenu Magaña, her nurse practitioner, and her oncologist, Dr. Yi West.  The patient was brought to the operating room in supine position.  IV sedation given, prepped and draped in a sterile fashion.  Had a finder needle access of the right internal jugular vein with insertion of access needle, and then insertion of a guidewire with a dilator, and sheath placed over the guidewire.  Patient then underwent insertion of 8-Upper sorbian MediPort on the right anterior chest wall.  It was sutured in place with 0 Ethibond.  The catheter was tunneled to the right IJ insertion site, and placed through the peel-away sheath after the dilator and guidewire were removed.  Once this was done, fluoroscopy was used to verify positioning of the catheter.  Fluoroscopy was used for the entire case including placement of the guidewire from the superior to inferior vena cava via the right IJ.  Patient had suturing of the MediPort in place with 0 Ethibond.  The subcu was closed with 3-0 Vicryl and the skin was closed with 4-0 plain gut suture.  The MediPort itself had good blood return.  No pulsatile flow.  Bilateral breath sounds were noted coloration of blood was appropriate.  Sterile dressings were applied.  No problems were encountered.   Chest x-ray will be obtained to complete the procedure.         I appreciate the consultation referral from her nurse practitioner, Ms. Meenu Magaña, and the involvement of Dr. Yi West.        ANASTASIYA/JACQUELINE   DD: 04/12/2018 1252   DT: 04/12/2018 1305  Job # [id number]    cc: Ms. Yi Joy MD

## 2022-05-02 NOTE — HISTORICAL [facility] CERNER
This is a historical note converted from [employer]. Formatting and pictures may have been removed.  Please reference [employer] for original formatting and attached multimedia. Indication for Surgery  screening for colon cancer, fam hx +  Preoperative Diagnosis  Screening colon cancer  Postoperative Diagnosis  same  Operation  Colonoscopy  Surgeon(s)  Alexander Bertrand MD  Assistant  Hakeem Epstein MD  Anesthesia  MAC  Estimated Blood Loss  none  Findings  one internal hermorrhoid, no polyps identified  Specimen(s)  none  Complications  none  Technique  After timeout performed, the patient was placed Left lateral decubitus on the stretcher. Anesthesia placed the patient under MAC. A digital rectal exam was performed, no abnormality noted. The colonoscope was introduced and advanced in a retrograde fashion to the cecum. Appendiceal orifice and the ileocecal valve were identified and pictures taken. The colonoscope was then slowly withdrawn from the cecum to the rectum inspecting the colon in a 360 degree view. All pools of fluid were suctioned. No polyps were identified. At the rectum the colonoscope was then retroflexed and the anal canal inspected. There were was one internal hemorrhoid identified on retroflexion.. The patient was aroused from anesthesia having suffered no untoward event.  ?   Dr. Bertrand was present throughout the entire procedure.  ?   5 year colonoscopy schedule   Agree w above. (+) family h/o colon cancer. Repeat colonoscopy in 5 years.

## 2022-05-02 NOTE — HISTORICAL [facility] CERNER
This is a historical note converted from [employer]. Formatting and pictures may have been removed.  Please reference [employer] for original formatting and attached multimedia. Chief Complaint  new pt. referral carpal tunnel,pt states burning pain and numbness to both hands  History of Present Illness  This is a 51 year old right handed female with hx of HTN, Hypothyroidism, CKD III (GFR 50), Bipolar Disorder, Diffuse Large B Cell Lymphoma s/p R-CHOP (4/26/2018 - 8/9/2018) s/p Radiotherapy, who is referred for hand numbness after RCHOP. Patient noted that she has numbness and burning sensation?in bilateral LE and UE along muscle cramps in her hands and feet after RCHOP.?States that she had numbness in bilateral hands prior to RCHOP. She also notes radial wrist pain with exertion, which leads her to drop things. 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 Does not drink.?  ?  Current Meds -  LTG 150mg daily  ?  Review of Systems  Constitutional:?no fever, fatigue, weakness  Eye:?no vision loss, eye redness, drainage, or pain  ENMT:?no sore throat, ear pain, sinus pain/congestion, nasal congestion/drainage  Respiratory:?no cough, no wheezing, no shortness of breath  Cardiovascular:?no chest pain, no palpitations, no edema  Gastrointestinal:?no nausea, vomiting, or diarrhea. No abdominal pain  Genitourinary:?no dysuria, no urinary frequency or urgency, no hematuria  Hema/Lymph:?no abnormal bruising or bleeding  Endocrine:?no heat or cold intolerance, no excessive thirst or excessive urination  Musculoskeletal:?+This is a 29 year old Male with history of HTN, Hypothyroidism, HFpEF, Morbid Obesity, BEAN, hx of EtOH dependence, who is admitted for AMS since 3/15/2021, unable to rule out Wernickes Encephalopathy or Steroid Responsive Encephalopathy.  ?  ?   [] Thiamine 500mg IV TID for 3 days then 250mg daily IV TID for 5 days then switch back to oral 100mg daily thereafter.  [] Urine Copper, dsDNA, Anti RNP antibody, Anti-Smith Ab,  Anti-Ribosomal P Ab,?Anti-thyroglobulin Ab, paraneoplastic panel pending.  [] LP if unable to get MRI Brain.  [] consider restarting Seroquel - would avoid abrupt cessation of Seroquel due to risk of Seroquel withdrawal.  muscle or joint pain, no joint swelling  Integumentary:?no skin rash or abnormal lesion  Psychiatric: no depressed mood,?+ anxiety, no visual/auditory hallucinations, no delusions,? no suicidal or homicidal ideation  Neurologic: as per HPI  ?  Physical Exam  Vitals & Measurements  T:?36.9? ?C (Oral)? HR:?69(Peripheral)? RR:?16? BP:?121/64? SpO2:?99%? HT:?160.00?cm? WT:?85.000?kg?  General:?well-developed well-nourished in no acute distress  Eye: clear conjunctiva, eyelids normal  HENT:?TMs/ear canals clear, oropharynx without erythema/exudate, oropharynx and nasal mucosal surfaces moist, no maxillary/frontal sinus tenderness to palpation  Neck: full range of motion, no thyromegaly or lymphadenopathy  Respiratory:?clear to auscultation bilaterally  Cardiovascular:?regular rate and rhythm without murmurs, gallops or rubs  Gastrointestinal:?soft, non-tender, non-distended with normal bowel sounds, without masses to palpation  Genitourinary: no CVA tenderness to palpation  Musculoskeletal:?full range of motion of all extremities/spine without limitation or discomfort  Integumentary: no rashes or skin lesions present  Neurologic:  Mental Status- Alert and Oriented to time, self, place, Speech is fluent, with intact repetition, naming, reading, and comprehension, No Dysarthria.  CN II-XII - ?CN I Deferred, SARAH, Fundus sharp, non-pallor, no RAPD, VA grossly normal to finger counting at 6ft, No ptosis b/l, EOMI w/o nystagmus, LT/PP symmetric, intact in CN V1-V3 distribution b/l, masseter symmetric b/l, T/U midline, Shoulder shrug symmetric b/l, Hearing grossly intact b/l, VFFC, Face Symmetric.  Motor - Tone and Bulk nml throughout, No?involuntary movements, ?5/5 to confrontation throughout, FFM nml b/l,  No pronator drift.  Sensory - Temp decreased to b/l ankles and up to bilateral PIP in UE, Proprioception/Vibration decreased up to b/l Knees.  Reflexes -??1+ Throughout except absent AJ bilaterally, Babinski negative.  Cerebellar Exam - ?FNF wnl b/l no intention tremor.  Romberg - sways  Gait -?Normal, able to tandem stance b/l, able to hop and squat.  ?   Tinels and Phalens negative.  Assessment/Plan  1.?Polyneuropathy?G62.9  Ordered:  gabapentin, 300 mg = 1 cap(s), Oral, Once a day (at bedtime), # 30 cap(s), 3 Refill(s), Pharmacy: [facility][id number], 160, cm, Height/Length Dosing, 03/30/21 8:39:00 CDT, 85, kg, Weight Dosing, 03/30/21 8:39:00 CDT  Clinic Follow up, *Est. 06/30/21 3:00:00 CDT, Order for future visit, Polyneuropathy  Tenosynovitis of thumb, [facility]  Comprehensive Metabolic Panel, Routine collect, *Est. 03/30/21 3:00:00 CDT, Blood, Order for future visit, *Est. Stop date 03/30/21 3:00:00 CDT, Lab Collect, Polyneuropathy  Tenosynovitis of thumb, 03/30/21 9:14:00 CDT  Folate Level, Routine collect, *Est. 03/30/21 3:00:00 CDT, Blood, Order for future visit, *Est. Stop date 03/30/21 3:00:00 CDT, Lab Collect, Polyneuropathy  Tenosynovitis of thumb, 03/30/21 9:13:00 CDT  Free T4, Routine collect, *Est. 03/30/21 3:00:00 CDT, Blood, Order for future visit, *Est. Stop date 03/30/21 3:00:00 CDT, Lab Collect, Polyneuropathy  Tenosynovitis of thumb, 03/30/21 9:13:00 CDT  Hemoglobin A1C [facility], Routine collect, *Est. 03/30/21 3:00:00 CDT, Blood, Order for future visit, *Est. Stop date 03/30/21 3:00:00 CDT, Lab Collect, Polyneuropathy  Tenosynovitis of thumb, 03/30/21 9:14:00 CDT  Office/Outpatient Visit Level 4 New [id number] PC, Polyneuropathy  Tenosynovitis of thumb, [facility], 03/30/21 9:17:00 CDT  Thyroid Stimulating Hormone, Routine collect, *Est. 03/30/21 3:00:00 CDT, Blood, Order for future visit, *Est. Stop date 03/30/21 3:00:00 CDT, Lab Collect, Polyneuropathy  Tenosynovitis  of thumb, 03/30/21 9:13:00 CDT  Vitamin B12 Level, Routine collect, *Est. 03/30/21 3:00:00 CDT, Blood, Order for future visit, *Est. Stop date 03/30/21 3:00:00 CDT, Lab Collect, Polyneuropathy  Tenosynovitis of thumb, 03/30/21 9:13:00 CDT  ?  2.?Tenosynovitis of thumb?M65.9  Ordered:  Clinic Follow up, *Est. 06/30/21 3:00:00 CDT, Order for future visit, Polyneuropathy  Tenosynovitis of thumb, [facility]  Comprehensive Metabolic Panel, Routine collect, *Est. 03/30/21 3:00:00 CDT, Blood, Order for future visit, *Est. Stop date 03/30/21 3:00:00 CDT, Lab Collect, Polyneuropathy  Tenosynovitis of thumb, 03/30/21 9:14:00 CDT  Folate Level, Routine collect, *Est. 03/30/21 3:00:00 CDT, Blood, Order for future visit, *Est. Stop date 03/30/21 3:00:00 CDT, Lab Collect, Polyneuropathy  Tenosynovitis of thumb, 03/30/21 9:13:00 CDT  Free T4, Routine collect, *Est. 03/30/21 3:00:00 CDT, Blood, Order for future visit, *Est. Stop date 03/30/21 3:00:00 CDT, Lab Collect, Polyneuropathy  Tenosynovitis of thumb, 03/30/21 9:13:00 CDT  Hemoglobin A1C [facility], Routine collect, *Est. 03/30/21 3:00:00 CDT, Blood, Order for future visit, *Est. Stop date 03/30/21 3:00:00 CDT, Lab Collect, Polyneuropathy  Tenosynovitis of thumb, 03/30/21 9:14:00 CDT  Office/Outpatient Visit Level 4 New [id number] PC, Polyneuropathy  Tenosynovitis of thumb, [facility], 03/30/21 9:17:00 CDT  Thyroid Stimulating Hormone, Routine collect, *Est. 03/30/21 3:00:00 CDT, Blood, Order for future visit, *Est. Stop date 03/30/21 3:00:00 CDT, Lab Collect, Polyneuropathy  Tenosynovitis of thumb, 03/30/21 9:13:00 CDT  Vitamin B12 Level, Routine collect, *Est. 03/30/21 3:00:00 CDT, Blood, Order for future visit, *Est. Stop date 03/30/21 3:00:00 CDT, Lab Collect, Polyneuropathy  Tenosynovitis of thumb, 03/30/21 9:13:00 CDT  ?  This is a 51 year old right handed female with hx of HTN, Hypothyroidism, CKD III (GFR 50), Bipolar Disorder, Diffuse Large B Cell  Lymphoma s/p R-CHOP (4/26/2018 - 8/9/2018) s/p Radiotherapy, who is referred for chemo related polyneuropathy. Will r/o other reversible polyneuropathies. Also has a right wrist De Quervain Tenosynovitis.  ?  [] Reversible neuropathy labs today.  [] Start Neurontin 300mg qhs  ?  RTC 3?months  ?  I have explained the treatment plan, diagnosis, and prognosis to the patient, caretaker, family. All questions are answered to the best of my knowledge.  Face to Face Time?50 minute, including,?counseling, education, review of test results,?relevant medical records, and coordination of care. ? ?   Problem List/Past Medical History  Ongoing  Antineoplastic chemotherapy induced anemia  Anxiety disorder  Bipolar disorder  Chemotherapy induced neutropenia  Chemotherapy-induced nausea  Diffuse large B-cell lymphoma  DLBCL (diffuse large B cell lymphoma)  Left breast mass  Leg cramps  Lump in right breast  Mucositis due to chemotherapy  Neck mass  Obesity  Stomatitis  Syncopal episodes  Tobacco user  Historical  Hypertension  PTSD - Post-traumatic stress disorder  Procedure/Surgical History  Colonoscopy (None) (01/09/2020)  Colonoscopy, flexible; diagnostic, including collection of specimen(s) by brushing or washing, when performed (separate procedure) (01/09/2020)  Inspection of Lower Intestinal Tract, Via Natural or Artificial Opening Endoscopic (01/09/2020)  Catheter Removal/MP (05/31/2018)  Repair of central venous access device, with subcutaneous port or pump, central or peripheral insertion site (05/31/2018)  Revision of Totally Implantable Vascular Access Device in Trunk Subcutaneous Tissue and Fascia, External Approach (05/31/2018)  Catheter Insertion Mediport (Right, Upper, Torso) (04/12/2018)  Fluoroscopy of Superior Vena Cava using Low Osmolar Contrast, Guidance (04/12/2018)  Insertion of Infusion Device into Superior Vena Cava, Percutaneous Approach (04/12/2018)  Insertion of tunneled centrally inserted central venous  access device, with subcutaneous port; age 5 years or older (04/12/2018)  Bone Marrow Biopsy (None) (04/05/2018)  Diagnostic bone marrow; biopsy(ies) (04/05/2018)  Extraction of Iliac Bone Marrow, Percutaneous Approach, Diagnostic (04/05/2018)  Biopsy, breast, with placement of breast localization device(s) (eg, clip, metallic pellet), when performed, and imaging of the biopsy specimen, when performed, percutaneous; first lesion, including ultrasound guidance (04/02/2018)  Excision of Left Breast, Percutaneous Approach, Diagnostic (04/02/2018)  Extraction of Right Neck Lymphatic, Percutaneous Approach, Diagnostic (03/15/2018)  Fine needle aspiration; with imaging guidance (03/15/2018)  Biopsy of breast (02/15/2018)  Biopsy, breast, with placement of breast localization device(s) (eg, clip, metallic pellet), when performed, and imaging of the biopsy specimen, when performed, percutaneous; each additional lesion, including ultrasound guidance (List separately in additi (02/15/2018)  Biopsy, breast, with placement of breast localization device(s) (eg, clip, metallic pellet), when performed, and imaging of the biopsy specimen, when performed, percutaneous; each additional lesion, including ultrasound guidance (List separately in additi (02/15/2018)  Biopsy, breast, with placement of breast localization device(s) (eg, clip, metallic pellet), when performed, and imaging of the biopsy specimen, when performed, percutaneous; first lesion, including ultrasound guidance (02/15/2018)  Excision of Right Breast, Percutaneous Approach, Diagnostic (02/15/2018)  Lymph node biopsy   Medications  acetaminophen-codeine 300 mg-30 mg oral tablet., 1 tab(s), Oral, TID  escitalopram 10 mg oral tablet, 10 mg= 1 tab(s), Oral, Daily  Geodon 40 mg capsule, 40 mg= 1 cap(s), Oral, qPM  hydroCHLOROthiazide 12.5 mg oral capsule, 12.5 mg= 1 cap(s), Oral, qAM  hydrOXYzine pamoate 25 mg oral capsule, 25 mg= 1 cap(s), Oral, BID  lamoTRIgine, 150 mg,  Oral, BID  lamoTRIgine 150 mg oral tablet, 150 mg= 1 tab(s), Oral, Daily  loratadine 10 mg oral tablet, 10 mg= 1 tab(s), Oral, Daily  lovastatin 20 mg oral tablet, 20 mg= 1 tab(s), Oral, qPM  meloxicam 15 mg oral tablet, 15 mg= 1 tab(s), Oral, Daily  naproxen 500 mg oral tablet, 500 mg= 1 tab(s), Oral, BID  Neurontin 300 mg oral capsule, 300 mg= 1 cap(s), Oral, Once a day (at bedtime), 3 refills  Remeron 15 mg tablet, 7.5 mg= 0.5 tab(s), Oral, qPM  Allergies  penicillin?(unkknown)  Social History  Abuse/Neglect  No, No, 03/30/2021  No, No, Yes, 03/18/2021  Alcohol  Past, 03/30/2021  Past, 01/16/2019  Past, Liquor, 1-2 times per year, 1 drinks/episode average., 08/22/2018  Employment/School  Unemployed, 01/16/2019  Employed, Work/School description: [employer] 04/04/2018  Exercise  Home/Environment  Lives with Alone. Living situation: Home/Independent., 04/04/2018  Nutrition/Health  Regular, 05/30/2018  Sexual  Sexually active: No., 01/16/2019  Sexual orientation: Heterosexual., 04/04/2018  Substance Use  Current, Marijuana, 1-2 times per week, 02/15/2018  Tobacco  10 or more cigarettes (1/2 pack or more)/day in last 30 days, No, 03/30/2021  10 or more cigarettes (1/2 pack or more)/day in last 30 days, No, 03/18/2021  Family History  Diabetes mellitus type 2: Mother.  Healthy adult: Father.  Hypertension.: Mother and Father.  Stroke: Grandfather.  Unknown cause of morbidity or mortality.....: Mother.  Health Maintenance  Health Maintenance  ???Pending?(in the next year)  ??? ??OverDue  ??? ? ? ?Influenza Vaccine due??10/01/20??and every 1??day(s)  ??? ? ? ?Smoking Cessation due??01/01/21??and every 1??year(s)  ??? ? ? ?Alcohol Misuse Screening due??01/02/21??and every 1??year(s)  ??? ??Due?  ??? ? ? ?Lipid Screening due??03/30/21??Unknown Frequency  ??? ? ? ?Tetanus Vaccine due??03/30/21??and every 10??year(s)  ??? ? ? ?Zoster Vaccine due??03/30/21??Unknown Frequency  ??? ??Due In Future?  ??? ? ? ?Obesity  Screening not due until??01/01/22??and every 1??year(s)  ??? ? ? ?Cervical Cancer Screening not due until??01/15/22??and every 3??year(s)  ???Satisfied?(in the past 1 year)  ??? ??Satisfied?  ??? ? ? ?ADL Screening on??03/30/21.??Satisfied by Octavio LPN, Keila B  ??? ? ? ?Blood Pressure Screening on??03/30/21.??Satisfied by Octavio LPN, Keila B  ??? ? ? ?Body Mass Index Check on??03/30/21.??Satisfied by Octavio LPN, Keila B  ??? ? ? ?Breast Cancer Screening on??01/28/21.??Satisfied by Mary Chavez  ??? ? ? ?Depression Screening on??03/30/21.??Satisfied by Octavio LPN, Keila B  ??? ? ? ?Diabetes Screening on??12/02/20.??Satisfied by Luann Huynh MT  ??? ? ? ?Hypertension Management-Blood Pressure on??03/30/21.??Satisfied by Octavio LPN, Keila B  ??? ? ? ?Influenza Vaccine on??03/30/21.??Satisfied by Octavio LPN, Keila B  ??? ? ? ?Obesity Screening on??03/30/21.??Satisfied by Octavio LPN, Keila B  ?  Lab Results  Test Name Test Result Date/Time   eGFR-AA 72 (Low) 01/22/2021 10:00 CST   eGFR-JESSICA 59 mL/min/1.73 m2 (Low) 01/22/2021 10:00 CST

## 2022-05-02 NOTE — HISTORICAL [facility]ERNER
This is a historical note converted from [employer]. Formatting and pictures may have been removed.  Please reference [employer] for original formatting and attached multimedia. History of Present Illness  49F presenting for first screening colonoscopy. Hx of bipolar disorder, also has a hx of B cell lymphoma s/p chemo and radiation. Most recent scans negative for recurrence. She reports no symptoms of melena, GI bleeding, nausea vomiting. She denies any change in stool caliber or consistency. She has never had a colonoscopy or abdominal surgical operation.  Review of Systems  Negative unless otherwise stated  Physical Exam  Gen: NAD  CV: Periperhal pulses intact  Pulm: normal effort no accessory muscle use  Abd: soft nt nd  MSK: no edema, LAI  Assessment/Plan  Orders:  Discharge, 01/09/20 11:28:00 CST, Home, When Hansel score is 100% of pre-op score.  Vital Signs, 01/09/20 11:28:00 CST, q5min, during procedure, then release from procedure room when post-sedation Hansel is 80% of pre-op.  Vital Signs, 01/09/20 11:28:00 CST, q15min for 1 hr, Stop date 01/09/20 12:29:00 CST, For PACU  A: 49F with hx of bipolar disorder and B cell lymphoma s/p chemorads with resolution of cancer presenting for first screening colonoscopy  ?  P:  Colonoscopy today   Problem List/Past Medical History  Ongoing  Antineoplastic chemotherapy induced anemia  Anxiety disorder  Bipolar disorder  Chemotherapy induced neutropenia  Chemotherapy-induced nausea  Diffuse large B-cell lymphoma  DLBCL (diffuse large B cell lymphoma)  Left breast mass  Leg cramps  Lump in right breast  Mucositis due to chemotherapy  Neck mass  Obesity  Stomatitis  Syncopal episodes  Tobacco user  Historical  Hypertension  PTSD - Post-traumatic stress disorder  Procedure/Surgical History  Catheter Removal/MP (05/31/2018)  Repair of central venous access device, with subcutaneous port or pump, central or peripheral insertion site (05/31/2018)  Revision of Totally Implantable  Vascular Access Device in Trunk Subcutaneous Tissue and Fascia, External Approach (05/31/2018)  Catheter Insertion Mediport (Right, Upper, Torso) (04/12/2018)  Fluoroscopy of Superior Vena Cava using Low Osmolar Contrast, Guidance (04/12/2018)  Insertion of Infusion Device into Superior Vena Cava, Percutaneous Approach (04/12/2018)  Insertion of tunneled centrally inserted central venous access device, with subcutaneous port; age 5 years or older (04/12/2018)  Bone Marrow Biopsy (None) (04/05/2018)  Diagnostic bone marrow; biopsy(ies) (04/05/2018)  Extraction of Iliac Bone Marrow, Percutaneous Approach, Diagnostic (04/05/2018)  Biopsy, breast, with placement of breast localization device(s) (eg, clip, metallic pellet), when performed, and imaging of the biopsy specimen, when performed, percutaneous; first lesion, including ultrasound guidance (04/02/2018)  Excision of Left Breast, Percutaneous Approach, Diagnostic (04/02/2018)  Extraction of Right Neck Lymphatic, Percutaneous Approach, Diagnostic (03/15/2018)  Fine needle aspiration; with imaging guidance (03/15/2018)  Biopsy of breast (02/15/2018)  Biopsy, breast, with placement of breast localization device(s) (eg, clip, metallic pellet), when performed, and imaging of the biopsy specimen, when performed, percutaneous; each additional lesion, including ultrasound guidance (List separately in additi (02/15/2018)  Biopsy, breast, with placement of breast localization device(s) (eg, clip, metallic pellet), when performed, and imaging of the biopsy specimen, when performed, percutaneous; each additional lesion, including ultrasound guidance (List separately in additi (02/15/2018)  Biopsy, breast, with placement of breast localization device(s) (eg, clip, metallic pellet), when performed, and imaging of the biopsy specimen, when performed, percutaneous; first lesion, including ultrasound guidance (02/15/2018)  Excision of Right Breast, Percutaneous Approach, Diagnostic  (02/15/2018)  Lymph node biopsy   Medications  Inpatient  No active inpatient medications  Home  escitalopram 10 mg oral tablet, 10 mg= 1 tab(s), Oral, Daily  gabapentin 300 mg oral capsule, 300 mg= 1 cap(s), Oral, BID  Geodon 40 mg capsule, 40 mg= 1 cap(s), Oral, qPM  hydroCHLOROthiazide 12.5 mg oral capsule, 12.5 mg= 1 cap(s), Oral, qAM  lamoTRIgine 150 mg oral tablet, 150 mg= 1 tab(s), Oral, Daily  lovastatin 20 mg oral tablet, 20 mg= 1 tab(s), Oral, qPM  meloxicam 7.5 mg oral tablet, 7.5 mg= 1 tab(s), Oral, Daily  Remeron 15 mg tablet, 7.5 mg= 0.5 tab(s), Oral, qPM  Allergies  penicillin?(unkknown)  Social History  Abuse/Neglect  No, 01/08/2020  Alcohol  Past, 01/16/2019  Past, Liquor, 1-2 times per year, 1 drinks/episode average., 08/22/2018  Employment/School  Unemployed, 01/16/2019  Employed, Work/School description: [employer]., 04/04/2018  Exercise  Home/Environment  Lives with Alone. Living situation: Home/Independent., 04/04/2018  Nutrition/Health  Regular, 05/30/2018  Sexual  Sexually active: No., 01/16/2019  Sexual orientation: Heterosexual., 04/04/2018  Substance Use  Current, Marijuana, 1-2 times per week, 02/15/2018  Tobacco  Former smoker, quit more than 30 days ago, No, 01/08/2020  Family History  Diabetes mellitus type 2: Mother.  Healthy adult: Father.  Hypertension.: Mother and Father.  Stroke: Grandfather.  Unknown cause of morbidity or mortality.....: Mother.      Agree w above. Screening colonoscopy.

## 2022-05-03 DIAGNOSIS — N28.1 CYST OF KIDNEY, ACQUIRED: Primary | ICD-10-CM

## 2022-05-10 ENCOUNTER — HOSPITAL ENCOUNTER (OUTPATIENT)
Dept: RADIOLOGY | Facility: HOSPITAL | Age: 52
Discharge: HOME | End: 2022-05-10
Attending: UROLOGY
Payer: MEDICAID

## 2022-05-10 DIAGNOSIS — N28.1 CYST OF KIDNEY, ACQUIRED: ICD-10-CM

## 2022-05-10 PROCEDURE — 76770 US EXAM ABDO BACK WALL COMP: CPT | Mod: TC

## 2022-06-28 ENCOUNTER — OFFICE VISIT (OUTPATIENT)
Dept: NEUROLOGY | Facility: CLINIC | Age: 52
End: 2022-06-28
Payer: MEDICAID

## 2022-06-28 DIAGNOSIS — G62.9 POLYNEUROPATHY: Primary | ICD-10-CM

## 2022-06-28 DIAGNOSIS — M65.9 TENOSYNOVITIS OF THUMB: ICD-10-CM

## 2022-06-28 PROBLEM — M65.949 TENOSYNOVITIS OF THUMB: Status: ACTIVE | Noted: 2022-06-28

## 2022-06-28 PROCEDURE — 1159F MED LIST DOCD IN RCRD: CPT | Mod: CPTII,95,, | Performed by: NURSE PRACTITIONER

## 2022-06-28 PROCEDURE — 1159F PR MEDICATION LIST DOCUMENTED IN MEDICAL RECORD: ICD-10-PCS | Mod: CPTII,95,, | Performed by: NURSE PRACTITIONER

## 2022-06-28 PROCEDURE — 99213 OFFICE O/P EST LOW 20 MIN: CPT | Mod: 95,,, | Performed by: NURSE PRACTITIONER

## 2022-06-28 PROCEDURE — 1160F PR REVIEW ALL MEDS BY PRESCRIBER/CLIN PHARMACIST DOCUMENTED: ICD-10-PCS | Mod: CPTII,95,, | Performed by: NURSE PRACTITIONER

## 2022-06-28 PROCEDURE — 1160F RVW MEDS BY RX/DR IN RCRD: CPT | Mod: CPTII,95,, | Performed by: NURSE PRACTITIONER

## 2022-06-28 PROCEDURE — 99213 PR OFFICE/OUTPT VISIT, EST, LEVL III, 20-29 MIN: ICD-10-PCS | Mod: 95,,, | Performed by: NURSE PRACTITIONER

## 2022-06-28 RX ORDER — TRAZODONE HYDROCHLORIDE 50 MG/1
100 TABLET ORAL NIGHTLY
COMMUNITY

## 2022-06-28 RX ORDER — GABAPENTIN 100 MG/1
100 CAPSULE ORAL DAILY PRN
COMMUNITY
End: 2023-10-18

## 2022-06-28 RX ORDER — GABAPENTIN 300 MG/1
300 CAPSULE ORAL NIGHTLY
COMMUNITY
End: 2023-01-05 | Stop reason: SDUPTHER

## 2022-07-11 ENCOUNTER — LAB VISIT (OUTPATIENT)
Dept: LAB | Facility: HOSPITAL | Age: 52
End: 2022-07-11
Payer: MEDICAID

## 2022-07-11 DIAGNOSIS — G62.9 POLYNEUROPATHY: Primary | ICD-10-CM

## 2022-07-11 DIAGNOSIS — M65.9 TENOSYNOVITIS OF THUMB: ICD-10-CM

## 2022-07-11 DIAGNOSIS — G62.9 POLYNEUROPATHY: ICD-10-CM

## 2022-07-11 PROBLEM — Z95.828 PORT-A-CATH IN PLACE: Status: ACTIVE | Noted: 2022-07-11

## 2022-07-11 LAB
ALBUMIN SERPL-MCNC: 4 GM/DL (ref 3.5–5)
ALBUMIN/GLOB SERPL: 1.5 RATIO (ref 1.1–2)
ALP SERPL-CCNC: 59 UNIT/L (ref 40–150)
ALT SERPL-CCNC: 12 UNIT/L (ref 0–55)
AST SERPL-CCNC: 23 UNIT/L (ref 5–34)
BASOPHILS # BLD AUTO: 0.04 X10(3)/MCL (ref 0–0.2)
BASOPHILS NFR BLD AUTO: 0.6 %
BILIRUBIN DIRECT+TOT PNL SERPL-MCNC: 0.3 MG/DL
BUN SERPL-MCNC: 21 MG/DL (ref 9.8–20.1)
CALCIUM SERPL-MCNC: 9.5 MG/DL (ref 8.4–10.2)
CHLORIDE SERPL-SCNC: 106 MMOL/L (ref 98–107)
CO2 SERPL-SCNC: 30 MMOL/L (ref 22–29)
CREAT SERPL-MCNC: 1.27 MG/DL (ref 0.55–1.02)
EOSINOPHIL # BLD AUTO: 0.23 X10(3)/MCL (ref 0–0.9)
EOSINOPHIL NFR BLD AUTO: 3.6 %
ERYTHROCYTE [DISTWIDTH] IN BLOOD BY AUTOMATED COUNT: 12.3 % (ref 11.5–17)
GLOBULIN SER-MCNC: 2.7 GM/DL (ref 2.4–3.5)
GLUCOSE SERPL-MCNC: 80 MG/DL (ref 74–100)
HCT VFR BLD AUTO: 35.7 % (ref 37–47)
HGB BLD-MCNC: 11.4 GM/DL (ref 12–16)
IMM GRANULOCYTES # BLD AUTO: 0.02 X10(3)/MCL (ref 0–0.04)
IMM GRANULOCYTES NFR BLD AUTO: 0.3 %
LDH SERPL-CCNC: 212 U/L (ref 125–220)
LYMPHOCYTES # BLD AUTO: 2.31 X10(3)/MCL (ref 0.6–4.6)
LYMPHOCYTES NFR BLD AUTO: 35.9 %
MCH RBC QN AUTO: 32.2 PG (ref 27–31)
MCHC RBC AUTO-ENTMCNC: 31.9 MG/DL (ref 33–36)
MCV RBC AUTO: 100.8 FL (ref 80–94)
MONOCYTES # BLD AUTO: 0.37 X10(3)/MCL (ref 0.1–1.3)
MONOCYTES NFR BLD AUTO: 5.7 %
NEUTROPHILS # BLD AUTO: 3.5 X10(3)/MCL (ref 2.1–9.2)
NEUTROPHILS NFR BLD AUTO: 53.9 %
PLATELET # BLD AUTO: 169 X10(3)/MCL (ref 130–400)
PMV BLD AUTO: 10.1 FL (ref 7.4–10.4)
POTASSIUM SERPL-SCNC: 5.1 MMOL/L (ref 3.5–5.1)
PROT SERPL-MCNC: 6.7 GM/DL (ref 6.4–8.3)
RBC # BLD AUTO: 3.54 X10(6)/MCL (ref 4.2–5.4)
SODIUM SERPL-SCNC: 143 MMOL/L (ref 136–145)
WBC # SPEC AUTO: 6.4 X10(3)/MCL (ref 4.5–11.5)

## 2022-07-11 PROCEDURE — 36415 COLL VENOUS BLD VENIPUNCTURE: CPT

## 2022-07-11 PROCEDURE — 80053 COMPREHEN METABOLIC PANEL: CPT

## 2022-07-11 PROCEDURE — 83615 LACTATE (LD) (LDH) ENZYME: CPT

## 2022-07-11 PROCEDURE — 85025 COMPLETE CBC W/AUTO DIFF WBC: CPT

## 2022-07-12 ENCOUNTER — INFUSION (OUTPATIENT)
Dept: INFUSION THERAPY | Facility: HOSPITAL | Age: 52
End: 2022-07-12
Payer: MEDICAID

## 2022-07-12 ENCOUNTER — OFFICE VISIT (OUTPATIENT)
Dept: HEMATOLOGY/ONCOLOGY | Facility: CLINIC | Age: 52
End: 2022-07-12
Payer: MEDICAID

## 2022-07-12 VITALS
OXYGEN SATURATION: 97 % | HEIGHT: 62 IN | DIASTOLIC BLOOD PRESSURE: 65 MMHG | BODY MASS INDEX: 29.94 KG/M2 | SYSTOLIC BLOOD PRESSURE: 101 MMHG | TEMPERATURE: 98 F | WEIGHT: 162.69 LBS | RESPIRATION RATE: 16 BRPM | HEART RATE: 66 BPM

## 2022-07-12 DIAGNOSIS — C83.30 DIFFUSE LARGE B-CELL LYMPHOMA, UNSPECIFIED BODY REGION: Primary | ICD-10-CM

## 2022-07-12 DIAGNOSIS — D75.89 MACROCYTOSIS: ICD-10-CM

## 2022-07-12 DIAGNOSIS — D53.9 MACROCYTIC ANEMIA: ICD-10-CM

## 2022-07-12 DIAGNOSIS — Z95.828 PORT-A-CATH IN PLACE: Primary | ICD-10-CM

## 2022-07-12 PROCEDURE — 99213 PR OFFICE/OUTPT VISIT, EST, LEVL III, 20-29 MIN: ICD-10-PCS | Mod: S$PBB,,,

## 2022-07-12 PROCEDURE — 99999 PR PBB SHADOW E&M-EST. PATIENT-LVL IV: ICD-10-PCS | Mod: PBBFAC,,,

## 2022-07-12 PROCEDURE — 63600175 PHARM REV CODE 636 W HCPCS

## 2022-07-12 PROCEDURE — 1159F PR MEDICATION LIST DOCUMENTED IN MEDICAL RECORD: ICD-10-PCS | Mod: CPTII,,,

## 2022-07-12 PROCEDURE — 99214 OFFICE O/P EST MOD 30 MIN: CPT | Mod: PBBFAC

## 2022-07-12 PROCEDURE — 3078F PR MOST RECENT DIASTOLIC BLOOD PRESSURE < 80 MM HG: ICD-10-PCS | Mod: CPTII,,,

## 2022-07-12 PROCEDURE — 99999 PR PBB SHADOW E&M-EST. PATIENT-LVL IV: CPT | Mod: PBBFAC,,,

## 2022-07-12 PROCEDURE — 3078F DIAST BP <80 MM HG: CPT | Mod: CPTII,,,

## 2022-07-12 PROCEDURE — 3074F SYST BP LT 130 MM HG: CPT | Mod: CPTII,,,

## 2022-07-12 PROCEDURE — 96523 IRRIG DRUG DELIVERY DEVICE: CPT

## 2022-07-12 PROCEDURE — 3008F BODY MASS INDEX DOCD: CPT | Mod: CPTII,,,

## 2022-07-12 PROCEDURE — 3008F PR BODY MASS INDEX (BMI) DOCUMENTED: ICD-10-PCS | Mod: CPTII,,,

## 2022-07-12 PROCEDURE — 1159F MED LIST DOCD IN RCRD: CPT | Mod: CPTII,,,

## 2022-07-12 PROCEDURE — 99213 OFFICE O/P EST LOW 20 MIN: CPT | Mod: S$PBB,,,

## 2022-07-12 PROCEDURE — 3074F PR MOST RECENT SYSTOLIC BLOOD PRESSURE < 130 MM HG: ICD-10-PCS | Mod: CPTII,,,

## 2022-07-12 RX ORDER — FOLIC ACID 1 MG/1
1 TABLET ORAL DAILY
Qty: 90 TABLET | Refills: 3 | Status: SHIPPED | OUTPATIENT
Start: 2022-07-12 | End: 2023-07-10

## 2022-07-12 RX ORDER — SODIUM CHLORIDE 0.9 % (FLUSH) 0.9 %
10 SYRINGE (ML) INJECTION
Status: CANCELLED | OUTPATIENT
Start: 2022-07-12

## 2022-07-12 RX ORDER — SODIUM CHLORIDE 0.9 % (FLUSH) 0.9 %
10 SYRINGE (ML) INJECTION
Status: ACTIVE | OUTPATIENT
Start: 2022-07-12

## 2022-07-12 RX ORDER — HEPARIN 100 UNIT/ML
500 SYRINGE INTRAVENOUS
Status: ACTIVE | OUTPATIENT
Start: 2022-07-12

## 2022-07-12 RX ORDER — HEPARIN 100 UNIT/ML
500 SYRINGE INTRAVENOUS
Status: CANCELLED | OUTPATIENT
Start: 2022-07-12

## 2022-07-12 RX ADMIN — HEPARIN 500 UNITS: 100 SYRINGE at 02:07

## 2022-07-12 NOTE — PROGRESS NOTES
" [facility] Hematology/Oncology  PROGRESS NOTE      Subjective:         NAME: Carlota Oakley : 1970     52 y.o. female   MRN: [id number]    Referring Doc: Henrique Eldridge NP Dr. Ben Sabbaghian Felicia Joseph, NP      Chief Complaint:    Chief Complaint   Patient presents with    DLBCL     Pt reports unexplained bruising for about a year now.       Oncology/Hematology History:  1. DLBCL, stage II. Diag 3/2018  R submandibular LN biopsy 3/15/18: Diffuse large B cell lymphoma. Positive for CD45, CD20, BCL2, and BCL6; negative for CD3, CD10, CD56 and TTF-1. FISH studies: No evidence of MYC rearrangement, BCL2-IGH [translocation t(14;18)] gene rearrangement or BCL6 (3q27) breakpoint translocation.    Bone marrow biopsy 18: No evidence of malignancy.   R-CHOP x 6 cycles, 18 - 18  Consolidative radiotherapy 18-18    2. Bilateral breast masses  Biopsy R breast 2/15/18: Fibroadenoma  Biopsy L breast 18: Fibroadenoma    Clinical History: Ms. Oakley is a 49 yo WF with PMH of bipolar disorder and HTN who was recently diagnosed with DLBCL. Pt initially noticed the "lump" under her R chin in early 2018. She went to urgent care and was prescribed a course of antibiotics. The mass didn't improve and she subsequently developed some swallowing difficulties. On 3/10/18 she was seen at [facility] and CT neck done there demonstrated a R submandibular mass measuring 4.6 x 2.6 x 2.5 cm as well as a mass invading into the R maxillary bone. It was around this time that she had a routine follow up with Dr. Wisam Eldridge (he was performing workup for bilateral breast masses) and he ordered a biopsy of the submandibular mass. Biopsy done on 3/15/18 confirmed diagnosis of diffuse large B cell lymphoma. IHC and FISH results noted above.           Interval History:    Ms. Oakley is here today alone for follow up.  She denies any n/v/d/c, abdominal pain, pruritus, fevers, abnormal lumps, or night sweats.  She " continues with gabapentin for neuropathy at bedtime. She does follow with neurology. Neuropathy worse at night. She continues to smoke just under a pack a day. She continues with easy bruising, plts are WNL.        ROS:   GEN: normal without any fever, night sweats or weight loss  HEENT: normal with no HA's, sore throat, stiff neck, changes in vision  CV: normal with no CP, SOB, PND, LINTON or orthopnea  PULM: normal with no SOB, cough, hemoptysis, sputum or pleuritic pain  GI: normal with no abdominal pain, nausea, vomiting, constipation, diarrhea, melanotic stools, BRBPR, or hematemesis  : normal with no hematuria, dysuria  BREAST: normal with no mass, discharge, pain  SKIN: normal with no rash, erythema, bruising, or swelling    Allergies:  Review of patient's allergies indicates:   Allergen Reactions    Penicillins Hives     Other reaction(s): rash       Medications:    Current Outpatient Medications:     escitalopram oxalate (LEXAPRO) 10 MG tablet, 10 mg once daily., Disp: , Rfl: 0    gabapentin (NEURONTIN) 100 MG capsule, Take 100 mg by mouth daily as needed., Disp: , Rfl:     gabapentin (NEURONTIN) 300 MG capsule, Take 300 mg by mouth every evening., Disp: , Rfl:     lamoTRIgine (LAMICTAL) 150 MG Tab, 150 mg once daily., Disp: , Rfl:     lovastatin (MEVACOR) 20 MG tablet, TK 1 T PO QD WITH THE DANNA MEAL, Disp: , Rfl: 3    meloxicam (MOBIC) 7.5 MG tablet, Take 15 mg by mouth every evening., Disp: , Rfl:     mirtazapine (REMERON) 15 MG tablet, 15 mg every evening., Disp: , Rfl: 5    traZODone (DESYREL) 50 MG tablet, Take 50 mg by mouth every evening., Disp: , Rfl:     ziprasidone (GEODON) 40 MG Cap, Take 40 mg by mouth every evening., Disp: , Rfl:     PMHx/PSHx Updates:   Past Medical History:   Diagnosis Date    Cancer     Headache     Memory loss     Mental disorder     Movement disorder     Neuropathy      Past Surgical History:   Procedure Laterality Date    BONE MARROW BIOPSY      BREAST  "BIOPSY      COLONOSCOPY N/A 01/09/2020    CYSTOSCOPY      LYMPH NODE BIOPSY         Family History:  Family History   Problem Relation Age of Onset    Hypertension Mother     Diabetes Mother     Hypertension Father     Stroke Maternal Grandfather        Social History:  Social History     Socioeconomic History    Marital status: Single   Tobacco Use    Smoking status: Current Every Day Smoker     Packs/day: 1.00    Smokeless tobacco: Never Used   Substance and Sexual Activity    Alcohol use: Not Currently    Drug use: Yes     Frequency: 12.0 times per week     Types: Marijuana             Objective:     ECOG SCORE    1 - Restricted in strenuous activity-ambulatory and able to carry out work of a light nature         Vitals:  Blood pressure 101/65, pulse 66, temperature 98.3 °F (36.8 °C), resp. rate 16, height 5' 2" (1.575 m), weight 73.8 kg (162 lb 11.2 oz), SpO2 97 %.    Physical Examination:   GEN: no apparent distress, comfortable; AAOx3  HEAD: atraumatic and normocephalic  EYES: no pallor, no icterus, PERRLA  ENT: OMM, no pharyngeal erythema, external ears WNL; no nasal discharge; no thrush  NECK: no masses, thyroid normal, trachea midline, no LAD/LN's, supple  CV: RRR with no murmur; normal pulse; normal S1 and S2; no pedal edema  CHEST: Normal respiratory effort; CTAB; normal breath sounds; no wheeze or crackles  ABDOM: nontender and nondistended; soft; normal bowel sounds; no rebound/guarding  MUSC/Skeletal: ROM normal; no crepitus; joints normal; no deformities or arthropathy  EXTREM: no clubbing, cyanosis, inflammation or swelling  SKIN: no rashes, lesions, ulcers, petechiae or subcutaneous nodules  : no hale  NEURO: grossly intact; motor/sensory WNL; AAOx3; no tremors  PSYCH: normal mood, affect and behavior  LYMPH: normal cervical, supraclavicular, axillary and groin LN's      Labs:   Lab Visit on 07/11/2022   Component Date Value Ref Range Status    Sodium Level 07/11/2022 143  136 - 145 " mmol/L Final    Potassium Level 07/11/2022 5.1  3.5 - 5.1 mmol/L Final    Chloride 07/11/2022 106  98 - 107 mmol/L Final    Carbon Dioxide 07/11/2022 30 (A) 22 - 29 mmol/L Final    Glucose Level 07/11/2022 80  74 - 100 mg/dL Final    Blood Urea Nitrogen 07/11/2022 21.0 (A) 9.8 - 20.1 mg/dL Final    Creatinine 07/11/2022 1.27 (A) 0.55 - 1.02 mg/dL Final    Calcium Level Total 07/11/2022 9.5  8.4 - 10.2 mg/dL Final    Protein Total 07/11/2022 6.7  6.4 - 8.3 gm/dL Final    Albumin Level 07/11/2022 4.0  3.5 - 5.0 gm/dL Final    Globulin 07/11/2022 2.7  2.4 - 3.5 gm/dL Final    Albumin/Globulin Ratio 07/11/2022 1.5  1.1 - 2.0 ratio Final    Bilirubin Total 07/11/2022 0.3  <=1.5 mg/dL Final    Alkaline Phosphatase 07/11/2022 59  40 - 150 unit/L Final    Alanine Aminotransferase 07/11/2022 12  0 - 55 unit/L Final    Aspartate Aminotransferase 07/11/2022 23  5 - 34 unit/L Final    Estimated GFR-Non  07/11/2022 47  mls/min/1.73/m2 Final    Lactate Dehydrogenase 07/11/2022 212  125 - 220 U/L Final    WBC 07/11/2022 6.4  4.5 - 11.5 x10(3)/mcL Final    RBC 07/11/2022 3.54 (A) 4.20 - 5.40 x10(6)/mcL Final    Hgb 07/11/2022 11.4 (A) 12.0 - 16.0 gm/dL Final    Hct 07/11/2022 35.7 (A) 37.0 - 47.0 % Final    MCV 07/11/2022 100.8 (A) 80.0 - 94.0 fL Final    MCH 07/11/2022 32.2 (A) 27.0 - 31.0 pg Final    MCHC 07/11/2022 31.9 (A) 33.0 - 36.0 mg/dL Final    RDW 07/11/2022 12.3  11.5 - 17.0 % Final    Platelet 07/11/2022 169  130 - 400 x10(3)/mcL Final    MPV 07/11/2022 10.1  7.4 - 10.4 fL Final    Neut % 07/11/2022 53.9  % Final    Lymph % 07/11/2022 35.9  % Final    Mono % 07/11/2022 5.7  % Final    Eos % 07/11/2022 3.6  % Final    Basophil % 07/11/2022 0.6  % Final    Lymph # 07/11/2022 2.31  0.6 - 4.6 x10(3)/mcL Final    Neut # 07/11/2022 3.5  2.1 - 9.2 x10(3)/mcL Final    Mono # 07/11/2022 0.37  0.1 - 1.3 x10(3)/mcL Final    Eos # 07/11/2022 0.23  0 - 0.9 x10(3)/mcL Final    Baso  # 07/11/2022 0.04  0 - 0.2 x10(3)/mcL Final    IG# 07/11/2022 0.02  0 - 0.04 x10(3)/mcL Final    IG% 07/11/2022 0.3  % Final       Radiology/Diagnostic Studies:  CT neck 3/10/18: Indistinct soft tissue mass identified involving the right maxilla at the floor of the maxillary sinus and alveolar ridge. Matted adenopathy is identified in the R submandibular space measuring 4.6 x 2.6 x 2.5 cm.   2D echocardiogram 4/9/18: LVEF 62%   PET 4/19/18: Hypermetabolic erosive mass along the right maxillary floor/alveolar ridge. Right submandibular hypermetabolic lymph node conglomeration. Additional mildly prominent lymph nodes at left level IIa and in the low right paratracheal space with mild hypermetabolism. Nonspecific mild hypermetabolism along the posterior and posterolateral left sixth rib without correlating CT abnormality. Mild radiotracer activity associated with a lobulated soft tissue nodule in the retroareolar left breast, likely related to the recently biopsied lesion.   PET 6/20/18: Tumor regression. No new findings.   PET 8/28/18: Negative.   MRI brain 9/14/18: Mild generalized cerebral and cerebellar atrophy, normal for patient's age. Mucoperiosteal disease left maxillary sinus and ethmoid sinuses.   CT neck 11/1/18: Small lipoma at BB marker on the palpable right neck measuring up to 2.5cm.   PET 3/19/19: No evidence of hypermetabolic nika disease or suggestion of other FDG-avid or aggressive process through the head/neck, chest, abdomen, or pelvis.         I have reviewed all available lab results and radiology reports.    Assessment/Plan:   1. DLBCL (diffuse large B cell lymphoma) C83.30 Pt with DLBCL, continues in remission. Unclear explanation for weight loss, but this has been more stable recently. Will continue to follow. RTC 6 months or prn.  2. Macrocytic anemia- Hx of macrocytosis and folate deficiency with normal vit B12. Will begin Folic acid 1mg daily--> sent to pharmacy. Recheck B12 and folate  next visit.  3. Neuropathy- Continue to follow with neurology. Recommend compression socks at night.   4. Hx Hematuria- Continue to follow with urology    PLAN:    RTC 6 months  Cbc cmp ldh folate vit b12  Discussion:     I have explained all of the above in detail and the patient understands all of the current recommendation(s). I have answered all of their questions to the best of my ability and to their complete satisfaction.   The patient is to continue with the current management plan.            Electronically signed by Carolynn Fried NP

## 2022-08-10 ENCOUNTER — CLINICAL SUPPORT (OUTPATIENT)
Dept: RESPIRATORY THERAPY | Facility: HOSPITAL | Age: 52
End: 2022-08-10
Attending: NURSE PRACTITIONER
Payer: MEDICAID

## 2022-08-10 DIAGNOSIS — R00.1 BRADYCARDIA: Primary | ICD-10-CM

## 2022-08-10 DIAGNOSIS — R00.1 BRADYCARDIA: ICD-10-CM

## 2022-08-10 PROCEDURE — 93005 ELECTROCARDIOGRAM TRACING: CPT

## 2022-10-13 ENCOUNTER — INFUSION (OUTPATIENT)
Dept: INFUSION THERAPY | Facility: HOSPITAL | Age: 52
End: 2022-10-13
Payer: MEDICAID

## 2022-10-13 VITALS
OXYGEN SATURATION: 95 % | BODY MASS INDEX: 28.47 KG/M2 | SYSTOLIC BLOOD PRESSURE: 102 MMHG | RESPIRATION RATE: 18 BRPM | TEMPERATURE: 99 F | HEIGHT: 63 IN | DIASTOLIC BLOOD PRESSURE: 63 MMHG | WEIGHT: 160.69 LBS | HEART RATE: 74 BPM

## 2022-10-13 DIAGNOSIS — Z95.828 PORT-A-CATH IN PLACE: Primary | ICD-10-CM

## 2022-10-13 PROCEDURE — 96523 IRRIG DRUG DELIVERY DEVICE: CPT

## 2022-10-13 PROCEDURE — 63600175 PHARM REV CODE 636 W HCPCS

## 2022-10-13 PROCEDURE — 25000003 PHARM REV CODE 250

## 2022-10-13 PROCEDURE — A4216 STERILE WATER/SALINE, 10 ML: HCPCS

## 2022-10-13 RX ORDER — HEPARIN 100 UNIT/ML
500 SYRINGE INTRAVENOUS
Status: CANCELLED | OUTPATIENT
Start: 2022-10-13

## 2022-10-13 RX ORDER — HEPARIN 100 UNIT/ML
500 SYRINGE INTRAVENOUS
Status: ACTIVE | OUTPATIENT
Start: 2022-10-13

## 2022-10-13 RX ORDER — SODIUM CHLORIDE 0.9 % (FLUSH) 0.9 %
10 SYRINGE (ML) INJECTION
Status: ACTIVE | OUTPATIENT
Start: 2022-10-13

## 2022-10-13 RX ORDER — SODIUM CHLORIDE 0.9 % (FLUSH) 0.9 %
10 SYRINGE (ML) INJECTION
Status: CANCELLED | OUTPATIENT
Start: 2022-10-13

## 2022-10-13 RX ADMIN — Medication 10 ML: at 01:10

## 2022-10-13 RX ADMIN — HEPARIN 500 UNITS: 100 SYRINGE at 01:10

## 2023-01-03 NOTE — PROGRESS NOTES
This is a real-time audio/video visit that was performed with the originating site at patient's home and the distant site, [facility]. Verbal consent to participate in interactive audio & video visit was obtained.    I discussed with the patient regarding the nature of our telehealth visits, that:    - Our sessions are not being recorded and that personal health information is protected  - Provider would evaluate the patient and recommend diagnostics and treatments based on my assessment  - [facility] will provide follow up care in person if/when the patient needs it.     Subjective:       Patient ID: Carlota Oakley is a 52 y.o. female.     Chief Complaint: Peripheral Neuropathy     HPI   This is a 52 year old right handed female with hx of HTN, Hypothyroidism, CKD III (GFR 50), Bipolar Disorder, Diffuse Large B Cell Lymphoma s/p R-CHOP (4/26/2018 - 8/9/2018) s/p Radiotherapy, who was referred for hand numbness after RCHOP. Patient noted that she has numbness and burning sensation in bilateral LE and UE along muscle cramps in her hands and feet after RCHOP. States that she had numbness in bilateral hands prior to RCHOP. She also notes radial wrist pain with exertion, which leads her to drop things. 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 Does not drink. Pt was last seen on 6/28/2022. During that visit medications were continued.     Today, Pt states tingling has improved. New c/o cramping to L lateral/frontal area of leg. States it is not new, but seems to be worsening. Denies swelling or change in color. Will have labs drawn next week. Takes chinyere nightly, only takes daily dose when awakens in discomfort. 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     Current Meds -  LTG 150mg daily  Neurontin 300mg QHS (10/5/2021 - present)  Neurontin 100-300mg in AM (3/29/2022 - present) - only  takes PRN - effective     Review of Systems  Constitutional: no fever, fatigue, weakness  Eye: no vision loss, eye redness, drainage, or pain  ENMT: no sore throat, ear pain, sinus pain/congestion, nasal congestion/drainage  Respiratory: no cough, no wheezing, no shortness of breath  Cardiovascular: no chest pain, no palpitations, no edema  Gastrointestinal: no nausea, vomiting, or diarrhea. No abdominal pain  Genitourinary: no dysuria, no urinary frequency or urgency, no hematuria  Hema/Lymph: no abnormal bruising or bleeding  Endocrine: no heat or cold intolerance, no excessive thirst or excessive urination  Musculoskeletal: no muscle or joint pain, no joint swelling  Integumentary: no skin rash or abnormal lesion  Psychiatric: no depressed mood, + anxiety, no visual/auditory hallucinations, no delusions, no suicidal or homicidal ideation  Neurologic: as per HPI     Objective:   Physical Exam    General: well-developed well-nourished in no acute distress  Eye: clear conjunctiva, eyelids normal  HENT: oropharynx without erythema/exudate, oropharynx mucosal surface moist  Neck: full range of motion  Respiratory: no distress on RA  Cardiovascular: unable to assess due to nature of visit  Gastrointestinal: unable to assess due to nature of visit  Genitourinary: unable to assess due to nature of visit  Musculoskeletal: full range of motion of all extremities/spine without limitation or discomfort  Integumentary: no rashes or skin lesions present  Neurologic:  Mental Status- Alert and Oriented to time, self, place, Speech is fluent, with intact reading and comprehension, long term memory intact, No Dysarthria.  CN II-XII - CN I Deferred, No ptosis b/l, EOMI w/o nystagmus, T/U midline, Shoulder shrug symmetric b/l, Hearing grossly intact b/l, Face Symmetric.  Motor - Tone and Bulk nml throughout, No involuntary movements, no satelliting with orbiting, FFM nml b/l, No pronator drift.  Sensory - unable to assess due to  nature of visit  Reflexes - unable to assess due to nature of visit  Cerebellar Exam - FNF wnl b/l no intention tremor.  Romberg - mild sway  Gait - Normal, bilat arm swing intact    Tinels and Phalen's negative on 3/30/2021      Assessment:       1. Polyneuropathy     2. Tenosynovitis of thumb      Plan:       This is a 52 year old right handed female with hx of HTN, Hypothyroidism, CKD III (GFR 50), Bipolar Disorder, Diffuse Large B Cell Lymphoma s/p R-CHOP (4/26/2018 - 8/9/2018) s/p Radiotherapy, who was referred for chemo related polyneuropathy. Also has a right wrist De Quervain Tenosynovitis, discomfort improved w/brace and gabapentin. Taking folic acid at HS, tolerating better. Notes improvement in neuropathy w/AM dosing of gabapentin, only takes PRN. Discussed hydration as she works in covered, un-air conditioned area     [] c/w LTG 150mg daily  [] c/w Neurontin 300mg qhs  [] c/w chinyere 100mg-200mg QAM, may increase up to 300mg;   [] c/w folic acid supplement at HS  [] keep hydrated    RTC 6 months    I have explained the treatment plan, diagnosis, and prognosis to the patient, caretaker, family. All questions are answered to the best of my knowledge.    Face to Face Time 30 minute, including, counseling, education, review of test results, relevant medical records, and coordination of care.

## 2023-01-05 ENCOUNTER — OFFICE VISIT (OUTPATIENT)
Dept: NEUROLOGY | Facility: CLINIC | Age: 53
End: 2023-01-05
Payer: MEDICAID

## 2023-01-05 DIAGNOSIS — M65.9 TENOSYNOVITIS OF THUMB: ICD-10-CM

## 2023-01-05 DIAGNOSIS — G62.9 POLYNEUROPATHY: Primary | ICD-10-CM

## 2023-01-05 PROCEDURE — 1159F MED LIST DOCD IN RCRD: CPT | Mod: CPTII,95,, | Performed by: NURSE PRACTITIONER

## 2023-01-05 PROCEDURE — 99214 PR OFFICE/OUTPT VISIT, EST, LEVL IV, 30-39 MIN: ICD-10-PCS | Mod: 95,,, | Performed by: NURSE PRACTITIONER

## 2023-01-05 PROCEDURE — 1159F PR MEDICATION LIST DOCUMENTED IN MEDICAL RECORD: ICD-10-PCS | Mod: CPTII,95,, | Performed by: NURSE PRACTITIONER

## 2023-01-05 PROCEDURE — 1160F PR REVIEW ALL MEDS BY PRESCRIBER/CLIN PHARMACIST DOCUMENTED: ICD-10-PCS | Mod: CPTII,95,, | Performed by: NURSE PRACTITIONER

## 2023-01-05 PROCEDURE — 99214 OFFICE O/P EST MOD 30 MIN: CPT | Mod: 95,,, | Performed by: NURSE PRACTITIONER

## 2023-01-05 PROCEDURE — 1160F RVW MEDS BY RX/DR IN RCRD: CPT | Mod: CPTII,95,, | Performed by: NURSE PRACTITIONER

## 2023-01-05 RX ORDER — GABAPENTIN 300 MG/1
300 CAPSULE ORAL NIGHTLY
Qty: 90 CAPSULE | Refills: 3 | Status: SHIPPED | OUTPATIENT
Start: 2023-01-05 | End: 2024-03-06

## 2023-01-05 RX ORDER — LAMOTRIGINE 150 MG/1
150 TABLET ORAL DAILY
Qty: 90 TABLET | Refills: 3 | Status: SHIPPED | OUTPATIENT
Start: 2023-01-05

## 2023-01-05 NOTE — PATIENT INSTRUCTIONS
Continue with lamotrigine 150mg daily  Continue with gabapentin 300mg nightly  Continue with gabapentin 100-200mg daily as needed

## 2023-01-09 ENCOUNTER — LAB VISIT (OUTPATIENT)
Dept: LAB | Facility: HOSPITAL | Age: 53
End: 2023-01-09
Payer: MEDICAID

## 2023-01-09 DIAGNOSIS — D53.9 MACROCYTIC ANEMIA: ICD-10-CM

## 2023-01-09 DIAGNOSIS — C83.30 DIFFUSE LARGE B-CELL LYMPHOMA, UNSPECIFIED BODY REGION: ICD-10-CM

## 2023-01-09 LAB
ALBUMIN SERPL-MCNC: 3.7 G/DL (ref 3.5–5)
ALBUMIN/GLOB SERPL: 1.3 RATIO (ref 1.1–2)
ALP SERPL-CCNC: 69 UNIT/L (ref 40–150)
ALT SERPL-CCNC: 5 UNIT/L (ref 0–55)
AST SERPL-CCNC: 13 UNIT/L (ref 5–34)
BASOPHILS # BLD AUTO: 0.02 X10(3)/MCL (ref 0–0.2)
BASOPHILS NFR BLD AUTO: 0.3 %
BILIRUBIN DIRECT+TOT PNL SERPL-MCNC: 0.3 MG/DL
BUN SERPL-MCNC: 18 MG/DL (ref 9.8–20.1)
CALCIUM SERPL-MCNC: 9.2 MG/DL (ref 8.4–10.2)
CHLORIDE SERPL-SCNC: 101 MMOL/L (ref 98–107)
CO2 SERPL-SCNC: 29 MMOL/L (ref 22–29)
CREAT SERPL-MCNC: 1.2 MG/DL (ref 0.55–1.02)
EOSINOPHIL # BLD AUTO: 0.21 X10(3)/MCL (ref 0–0.9)
EOSINOPHIL NFR BLD AUTO: 3.2 %
ERYTHROCYTE [DISTWIDTH] IN BLOOD BY AUTOMATED COUNT: 11.5 % (ref 11–14.5)
FOLATE SERPL-MCNC: 33.4 NG/ML (ref 7–31.4)
GFR SERPLBLD CREATININE-BSD FMLA CKD-EPI: 54 MLS/MIN/1.73/M2
GLOBULIN SER-MCNC: 2.9 GM/DL (ref 2.4–3.5)
GLUCOSE SERPL-MCNC: 105 MG/DL (ref 74–100)
HCT VFR BLD AUTO: 37.8 % (ref 37–47)
HGB BLD-MCNC: 11.7 GM/DL (ref 12–16)
IMM GRANULOCYTES # BLD AUTO: 0.02 X10(3)/MCL (ref 0–0.04)
IMM GRANULOCYTES NFR BLD AUTO: 0.3 %
LDH SERPL-CCNC: 178 U/L (ref 125–220)
LYMPHOCYTES # BLD AUTO: 1.54 X10(3)/MCL (ref 0.6–4.6)
LYMPHOCYTES NFR BLD AUTO: 23.2 %
MCH RBC QN AUTO: 32.1 PG
MCHC RBC AUTO-ENTMCNC: 31 MG/DL (ref 33–36)
MCV RBC AUTO: 103.6 FL (ref 80–94)
MONOCYTES # BLD AUTO: 0.32 X10(3)/MCL (ref 0.1–1.3)
MONOCYTES NFR BLD AUTO: 4.8 %
NEUTROPHILS # BLD AUTO: 4.53 X10(3)/MCL (ref 2.1–9.2)
NEUTROPHILS NFR BLD AUTO: 68.2 %
PLATELET # BLD AUTO: 175 X10(3)/MCL (ref 140–371)
PMV BLD AUTO: 10.3 FL (ref 9.4–12.4)
POTASSIUM SERPL-SCNC: 4.2 MMOL/L (ref 3.5–5.1)
PROT SERPL-MCNC: 6.6 GM/DL (ref 6.4–8.3)
RBC # BLD AUTO: 3.65 X10(6)/MCL (ref 4.2–5.4)
SODIUM SERPL-SCNC: 137 MMOL/L (ref 136–145)
VIT B12 SERPL-MCNC: 337 PG/ML (ref 213–816)
WBC # SPEC AUTO: 6.6 X10(3)/MCL (ref 4.5–11.5)

## 2023-01-09 PROCEDURE — 85025 COMPLETE CBC W/AUTO DIFF WBC: CPT

## 2023-01-09 PROCEDURE — 36415 COLL VENOUS BLD VENIPUNCTURE: CPT

## 2023-01-09 PROCEDURE — 82607 VITAMIN B-12: CPT

## 2023-01-09 PROCEDURE — 82746 ASSAY OF FOLIC ACID SERUM: CPT

## 2023-01-09 PROCEDURE — 83615 LACTATE (LD) (LDH) ENZYME: CPT

## 2023-01-09 PROCEDURE — 80053 COMPREHEN METABOLIC PANEL: CPT

## 2023-01-12 ENCOUNTER — CLINICAL SUPPORT (OUTPATIENT)
Dept: HEMATOLOGY/ONCOLOGY | Facility: CLINIC | Age: 53
End: 2023-01-12
Payer: MEDICAID

## 2023-01-12 ENCOUNTER — INFUSION (OUTPATIENT)
Dept: INFUSION THERAPY | Facility: HOSPITAL | Age: 53
End: 2023-01-12
Payer: MEDICAID

## 2023-01-12 VITALS
TEMPERATURE: 98 F | OXYGEN SATURATION: 99 % | HEART RATE: 70 BPM | HEIGHT: 63 IN | WEIGHT: 162.25 LBS | BODY MASS INDEX: 28.75 KG/M2 | DIASTOLIC BLOOD PRESSURE: 66 MMHG | SYSTOLIC BLOOD PRESSURE: 102 MMHG | RESPIRATION RATE: 18 BRPM

## 2023-01-12 VITALS
BODY MASS INDEX: 28.75 KG/M2 | TEMPERATURE: 98 F | HEART RATE: 70 BPM | SYSTOLIC BLOOD PRESSURE: 102 MMHG | HEIGHT: 63 IN | DIASTOLIC BLOOD PRESSURE: 66 MMHG | RESPIRATION RATE: 18 BRPM | WEIGHT: 162.25 LBS | OXYGEN SATURATION: 99 %

## 2023-01-12 DIAGNOSIS — C83.30 DIFFUSE LARGE B-CELL LYMPHOMA, UNSPECIFIED BODY REGION: Primary | ICD-10-CM

## 2023-01-12 DIAGNOSIS — Z12.31 BREAST CANCER SCREENING BY MAMMOGRAM: ICD-10-CM

## 2023-01-12 DIAGNOSIS — Z95.828 PORT-A-CATH IN PLACE: Primary | ICD-10-CM

## 2023-01-12 DIAGNOSIS — D75.89 MACROCYTOSIS: ICD-10-CM

## 2023-01-12 DIAGNOSIS — D64.9 ANEMIA, UNSPECIFIED TYPE: ICD-10-CM

## 2023-01-12 DIAGNOSIS — M79.622 PAIN IN LEFT AXILLA: ICD-10-CM

## 2023-01-12 PROCEDURE — A4216 STERILE WATER/SALINE, 10 ML: HCPCS

## 2023-01-12 PROCEDURE — 99214 PR OFFICE/OUTPT VISIT, EST, LEVL IV, 30-39 MIN: ICD-10-PCS | Mod: S$PBB,,,

## 2023-01-12 PROCEDURE — 96523 IRRIG DRUG DELIVERY DEVICE: CPT

## 2023-01-12 PROCEDURE — 99999 PR PBB SHADOW E&M-EST. PATIENT-LVL V: CPT | Mod: PBBFAC,,,

## 2023-01-12 PROCEDURE — 25000003 PHARM REV CODE 250

## 2023-01-12 PROCEDURE — 99999 PR PBB SHADOW E&M-EST. PATIENT-LVL V: ICD-10-PCS | Mod: PBBFAC,,,

## 2023-01-12 PROCEDURE — 99215 OFFICE O/P EST HI 40 MIN: CPT | Mod: PBBFAC

## 2023-01-12 PROCEDURE — 99214 OFFICE O/P EST MOD 30 MIN: CPT | Mod: S$PBB,,,

## 2023-01-12 PROCEDURE — 63600175 PHARM REV CODE 636 W HCPCS

## 2023-01-12 RX ORDER — SODIUM CHLORIDE 0.9 % (FLUSH) 0.9 %
10 SYRINGE (ML) INJECTION
Status: CANCELLED | OUTPATIENT
Start: 2023-01-12

## 2023-01-12 RX ORDER — SODIUM CHLORIDE 0.9 % (FLUSH) 0.9 %
10 SYRINGE (ML) INJECTION
Status: ACTIVE | OUTPATIENT
Start: 2023-01-12

## 2023-01-12 RX ORDER — HEPARIN 100 UNIT/ML
500 SYRINGE INTRAVENOUS
Status: DISPENSED | OUTPATIENT
Start: 2023-01-12

## 2023-01-12 RX ORDER — HEPARIN 100 UNIT/ML
500 SYRINGE INTRAVENOUS
Status: CANCELLED | OUTPATIENT
Start: 2023-01-12

## 2023-01-12 RX ADMIN — HEPARIN 500 UNITS: 100 SYRINGE at 02:01

## 2023-01-12 RX ADMIN — SODIUM CHLORIDE, PRESERVATIVE FREE 10 ML: 5 INJECTION INTRAVENOUS at 02:01

## 2023-01-12 NOTE — PLAN OF CARE
Patient will have no s/s of infection to mediport site. Will perform aseptic technique when accessing and flushing

## 2023-01-12 NOTE — PROGRESS NOTES
" [facility] Hematology/Oncology  PROGRESS NOTE      Subjective:         NAME: Carlota Oakley : 1970     52 y.o. female   MRN: [id number]    Referring Doc: No ref. provider found    Dr. Wisam Magaña NP      Chief Complaint:    Chief Complaint   Patient presents with    Lymphoma     Pt reports pain in the left axilla area x1-2 weeks and also ongoing neuropathy and left leg cramps        Oncology/Hematology History:  1. DLBCL, stage II. Diag 3/2018  R submandibular LN biopsy 3/15/18: Diffuse large B cell lymphoma. Positive for CD45, CD20, BCL2, and BCL6; negative for CD3, CD10, CD56 and TTF-1. FISH studies: No evidence of MYC rearrangement, BCL2-IGH [translocation t(14;18)] gene rearrangement or BCL6 (3q27) breakpoint translocation.    Bone marrow biopsy 18: No evidence of malignancy.   R-CHOP x 6 cycles, 18 - 18  Consolidative radiotherapy 18-18    2. Bilateral breast masses  Biopsy R breast 2/15/18: Fibroadenoma  Biopsy L breast 18: Fibroadenoma    Clinical History:   Ms. Oakley is a 49 yo WF with PMH of bipolar disorder and HTN who was recently diagnosed with DLBCL. Pt initially noticed the "lump" under her R chin in early 2018. She went to urgent care and was prescribed a course of antibiotics. The mass didn't improve and she subsequently developed some swallowing difficulties. On 3/10/18 she was seen at [facility] and CT neck done there demonstrated a R submandibular mass measuring 4.6 x 2.6 x 2.5 cm as well as a mass invading into the R maxillary bone. It was around this time that she had a routine follow up with Dr. Wisam Eldridge (he was performing workup for bilateral breast masses) and he ordered a biopsy of the submandibular mass. Biopsy done on 3/15/18 confirmed diagnosis of diffuse large B cell lymphoma. IHC and FISH results noted above.           Interval History:  Ms. Oakley is here today alone for follow up.  She denies any n/v/d/c, abdominal pain, " "pruritus, fevers, abnormal lumps, or night sweats.She continues with gabapentin for neuropathy at bedtime. She does follow with neurology. She continues to smoke just under a pack a day. She continues with easy bruising, plts are WNL. She does report having pain to her left axilla that has been present for "a couple of weeks". She will be due for a mammo in feb.         ROS:   Review of Systems   Constitutional:  Negative for appetite change, chills, fatigue, fever and unexpected weight change.   HENT:  Negative for facial swelling, mouth sores, nosebleeds, sinus pressure/congestion and sore throat.    Eyes:  Negative for photophobia, pain and visual disturbance.   Respiratory:  Negative for cough, chest tightness, shortness of breath and wheezing.    Cardiovascular:  Negative for chest pain, palpitations and leg swelling.   Gastrointestinal:  Negative for abdominal pain, blood in stool, change in bowel habit, constipation, diarrhea, nausea, vomiting and change in bowel habit.   Endocrine: Negative.    Genitourinary:  Negative for dysuria, frequency, hematuria, hot flashes, pelvic pain, urgency and vaginal pain.   Musculoskeletal:  Positive for back pain (Chonic). Negative for arthralgias, gait problem, joint swelling and myalgias.   Integumentary:  Negative for pallor, rash, wound, breast mass, breast discharge and breast tenderness.        Left axillary pain    Allergic/Immunologic: Negative.  Negative for immunocompromised state.   Neurological:  Negative for dizziness, vertigo, syncope, weakness, numbness and headaches.   Hematological:  Negative for adenopathy. Does not bruise/bleed easily.   Psychiatric/Behavioral:  Negative for behavioral problems and dysphoric mood. The patient is nervous/anxious.    All other systems reviewed and are negative.  Breast: Negative for mass and tenderness     Allergies:  Review of patient's allergies indicates:   Allergen Reactions    Penicillins Hives     Other reaction(s): " rash       Medications:    Current Outpatient Medications:     escitalopram oxalate (LEXAPRO) 10 MG tablet, 10 mg once daily., Disp: , Rfl: 0    folic acid (FOLVITE) 1 MG tablet, Take 1 tablet (1 mg total) by mouth once daily., Disp: 90 tablet, Rfl: 3    gabapentin (NEURONTIN) 100 MG capsule, Take 100 mg by mouth daily as needed., Disp: , Rfl:     gabapentin (NEURONTIN) 300 MG capsule, Take 1 capsule (300 mg total) by mouth every evening., Disp: 90 capsule, Rfl: 3    lamoTRIgine (LAMICTAL) 150 MG Tab, Take 1 tablet (150 mg total) by mouth once daily., Disp: 90 tablet, Rfl: 3    lovastatin (MEVACOR) 20 MG tablet, TK 1 T PO QD WITH THE DANNA MEAL, Disp: , Rfl: 3    meloxicam (MOBIC) 7.5 MG tablet, Take 15 mg by mouth every evening., Disp: , Rfl:     mirtazapine (REMERON) 15 MG tablet, 15 mg every evening., Disp: , Rfl: 5    traZODone (DESYREL) 50 MG tablet, Take 50 mg by mouth every evening., Disp: , Rfl:     ziprasidone (GEODON) 40 MG Cap, Take 40 mg by mouth every evening., Disp: , Rfl:     Current Facility-Administered Medications:     heparin, porcine (PF) 100 unit/mL injection flush 500 Units, 500 Units, Intravenous, PRN, Abstracted Order, MD, 500 Units at 10/13/22 1326    heparin, porcine (PF) 100 unit/mL injection flush 500 Units, 500 Units, Intravenous, PRN, Abstracted Order, MD    heparin, porcine (PF) 100 unit/mL injection flush 500 Units, 500 Units, Intravenous, PRN, Abstracted Order, MD, 500 Units at 01/12/23 1420    sodium chloride 0.9% flush 10 mL, 10 mL, Intravenous, PRN, Abstracted Order, MD, 10 mL at 10/13/22 1326    sodium chloride 0.9% flush 10 mL, 10 mL, Intravenous, PRN, Abstracted Order, MD, 10 mL at 01/12/23 1420    sodium chloride 0.9% flush 10 mL, 10 mL, Intravenous, PRN, Abstracted Order, MD    PMHx/PSHx Updates:   Past Medical History:   Diagnosis Date    Cancer     Headache     Memory loss     Mental disorder     Movement disorder     Neuropathy      Past Surgical History:   Procedure  "Laterality Date    BONE MARROW BIOPSY      BREAST BIOPSY      COLONOSCOPY N/A 01/09/2020    CYSTOSCOPY      LYMPH NODE BIOPSY         Family History:  Family History   Problem Relation Age of Onset    Hypertension Mother     Diabetes Mother     Hypertension Father     Stroke Maternal Grandfather        Social History:  Social History     Socioeconomic History    Marital status: Single   Tobacco Use    Smoking status: Every Day     Packs/day: 1.00     Types: Cigarettes    Smokeless tobacco: Never   Substance and Sexual Activity    Alcohol use: Not Currently    Drug use: Yes     Frequency: 12.0 times per week     Types: Marijuana             Objective:     ECOG SCORE    1 - Restricted in strenuous activity-ambulatory and able to carry out work of a light nature         Vitals:  Blood pressure 102/66, pulse 70, temperature 98.1 °F (36.7 °C), resp. rate 18, height 5' 3" (1.6 m), weight 73.6 kg (162 lb 4.1 oz), SpO2 99 %.    Physical Examination:   Physical Exam  Vitals reviewed.   Constitutional:       General: She is not in acute distress.     Appearance: Normal appearance. She is normal weight.   HENT:      Head: Normocephalic and atraumatic.      Nose: Nose normal. No congestion or rhinorrhea.      Mouth/Throat:      Mouth: Mucous membranes are moist.      Pharynx: Oropharynx is clear. No oropharyngeal exudate or posterior oropharyngeal erythema.   Eyes:      Extraocular Movements: Extraocular movements intact.      Conjunctiva/sclera: Conjunctivae normal.      Pupils: Pupils are equal, round, and reactive to light.   Cardiovascular:      Rate and Rhythm: Normal rate and regular rhythm.      Pulses: Normal pulses.      Heart sounds: Normal heart sounds. No murmur heard.    No friction rub. No gallop.   Pulmonary:      Effort: Pulmonary effort is normal. No respiratory distress.      Breath sounds: Normal breath sounds. No wheezing, rhonchi or rales.   Chest:      Comments: No adenopathy noted to bilateral breast or " axilla   Abdominal:      General: Abdomen is flat. Bowel sounds are normal.      Palpations: Abdomen is soft. There is no mass.      Tenderness: There is no abdominal tenderness. There is no guarding.   Musculoskeletal:         General: No swelling, tenderness or signs of injury. Normal range of motion.      Cervical back: Normal range of motion and neck supple.      Right lower leg: No edema.      Left lower leg: No edema.   Lymphadenopathy:      Cervical: No cervical adenopathy.   Skin:     General: Skin is warm and dry.      Findings: No bruising, erythema, lesion or rash.   Neurological:      General: No focal deficit present.      Mental Status: She is alert and oriented to person, place, and time. Mental status is at baseline.      Cranial Nerves: No cranial nerve deficit.      Motor: No weakness.      Gait: Gait normal.   Psychiatric:         Mood and Affect: Mood normal.         Behavior: Behavior normal.         Thought Content: Thought content normal.         Judgment: Judgment normal.          Labs:   Lab Visit on 01/09/2023   Component Date Value Ref Range Status    Sodium Level 01/09/2023 137  136 - 145 mmol/L Final    Potassium Level 01/09/2023 4.2  3.5 - 5.1 mmol/L Final    Chloride 01/09/2023 101  98 - 107 mmol/L Final    Carbon Dioxide 01/09/2023 29  22 - 29 mmol/L Final    Glucose Level 01/09/2023 105 (H)  74 - 100 mg/dL Final    Blood Urea Nitrogen 01/09/2023 18.0  9.8 - 20.1 mg/dL Final    Creatinine 01/09/2023 1.20 (H)  0.55 - 1.02 mg/dL Final    Calcium Level Total 01/09/2023 9.2  8.4 - 10.2 mg/dL Final    Protein Total 01/09/2023 6.6  6.4 - 8.3 gm/dL Final    Albumin Level 01/09/2023 3.7  3.5 - 5.0 g/dL Final    Globulin 01/09/2023 2.9  2.4 - 3.5 gm/dL Final    Albumin/Globulin Ratio 01/09/2023 1.3  1.1 - 2.0 ratio Final    Bilirubin Total 01/09/2023 0.3  <=1.5 mg/dL Final    Alkaline Phosphatase 01/09/2023 69  40 - 150 unit/L Final    Alanine Aminotransferase 01/09/2023 5  0 - 55 unit/L Final     Aspartate Aminotransferase 01/09/2023 13  5 - 34 unit/L Final    eGFR 01/09/2023 54  mls/min/1.73/m2 Final    Lactate Dehydrogenase 01/09/2023 178  125 - 220 U/L Final    Folate Level 01/09/2023 33.4 (H)  7.0 - 31.4 ng/mL Final    Vitamin B12 Level 01/09/2023 337  213 - 816 pg/mL Final    WBC 01/09/2023 6.6  4.5 - 11.5 x10(3)/mcL Final    RBC 01/09/2023 3.65 (L)  4.20 - 5.40 x10(6)/mcL Final    Hgb 01/09/2023 11.7 (L)  12.0 - 16.0 gm/dL Final    Hct 01/09/2023 37.8  37.0 - 47.0 % Final    MCV 01/09/2023 103.6 (H)  80.0 - 94.0 fL Final    MCH 01/09/2023 32.1  pg Final    MCHC 01/09/2023 31.0 (L)  33.0 - 36.0 mg/dL Final    RDW 01/09/2023 11.5  11.0 - 14.5 % Final    Platelet 01/09/2023 175  140 - 371 x10(3)/mcL Final    MPV 01/09/2023 10.3  9.4 - 12.4 fL Final    Neut % 01/09/2023 68.2  % Final    Lymph % 01/09/2023 23.2  % Final    Mono % 01/09/2023 4.8  % Final    Eos % 01/09/2023 3.2  % Final    Basophil % 01/09/2023 0.3  % Final    Lymph # 01/09/2023 1.54  0.6 - 4.6 x10(3)/mcL Final    Neut # 01/09/2023 4.53  2.1 - 9.2 x10(3)/mcL Final    Mono # 01/09/2023 0.32  0.1 - 1.3 x10(3)/mcL Final    Eos # 01/09/2023 0.21  0 - 0.9 x10(3)/mcL Final    Baso # 01/09/2023 0.02  0 - 0.2 x10(3)/mcL Final    IG# 01/09/2023 0.02  0 - 0.04 x10(3)/mcL Final    IG% 01/09/2023 0.3  % Final       Radiology/Diagnostic Studies:  CT neck 3/10/18: Indistinct soft tissue mass identified involving the right maxilla at the floor of the maxillary sinus and alveolar ridge. Matted adenopathy is identified in the R submandibular space measuring 4.6 x 2.6 x 2.5 cm.   2D echocardiogram 4/9/18: LVEF 62%   PET 4/19/18: Hypermetabolic erosive mass along the right maxillary floor/alveolar ridge. Right submandibular hypermetabolic lymph node conglomeration. Additional mildly prominent lymph nodes at left level IIa and in the low right paratracheal space with mild hypermetabolism. Nonspecific mild hypermetabolism along the posterior and  posterolateral left sixth rib without correlating CT abnormality. Mild radiotracer activity associated with a lobulated soft tissue nodule in the retroareolar left breast, likely related to the recently biopsied lesion.   PET 6/20/18: Tumor regression. No new findings.   PET 8/28/18: Negative.   MRI brain 9/14/18: Mild generalized cerebral and cerebellar atrophy, normal for patient's age. Mucoperiosteal disease left maxillary sinus and ethmoid sinuses.   CT neck 11/1/18: Small lipoma at BB marker on the palpable right neck measuring up to 2.5cm.   PET 3/19/19: No evidence of hypermetabolic nika disease or suggestion of other FDG-avid or aggressive process through the head/neck, chest, abdomen, or pelvis.         I have reviewed all available lab results and radiology reports.    Assessment/Plan:   DLBCL (diffuse large B cell lymphoma) C83.30   Pt with DLBCL, continues in remission. Unclear explanation for weight loss, but this has been more stable recently. Will continue to follow. RTC 6 months or prn.  Labs are stable today and patient is a symptomatic. Continue routine follow-up.   Macrocytic anemia  Hx of macrocytosis and folate deficiency with normal vit B12.   Folate today 33.4, improved from 5.2.  Neuropathy  Managed by neurology. Continue to follow with neurology   Hx Hematuria  Continue to follow with urology  Will check iron studies as well with next visit.   Left Axillary Pain   Due for mammogram in feb 2023. Will order today as well as US of left axilla to evaluate further.    PLAN:  RTC 6 months  Mammo and US ordered.   Cbc, CMP, LDH, Folate, Vitamin B12, Iron studies, Ferritin  Discussion:     I have explained all of the above in detail and the patient understands all of the current recommendation(s). I have answered all of their questions to the best of my ability and to their complete satisfaction.   The patient is to continue with the current management plan.            Electronically signed by  Melina Mcdonnell, P

## 2023-02-01 ENCOUNTER — TELEPHONE (OUTPATIENT)
Dept: HEMATOLOGY/ONCOLOGY | Facility: CLINIC | Age: 53
End: 2023-02-01
Payer: MEDICAID

## 2023-02-01 NOTE — TELEPHONE ENCOUNTER
----- Message from Nati Bangura sent at 2/1/2023  9:22 AM CST -----  MG Screen Right scheduled 02/09/23 @ [facility] w/ 7:00 arrival  Diag MG Left & U/S scheduled 02/13/23 @ [facility] w/ 1:45 arrival    Both appt's confirmed w/ patient  ----- Message -----  From: DEVIKA Quevedo  Sent: 1/12/2023   3:59 PM CST  To: Nati Urena and US ordered to be done at next available appt

## 2023-02-13 ENCOUNTER — HOSPITAL ENCOUNTER (OUTPATIENT)
Dept: RADIOLOGY | Facility: HOSPITAL | Age: 53
Discharge: HOME | End: 2023-02-13
Payer: MEDICAID

## 2023-02-13 DIAGNOSIS — M79.622 PAIN IN LEFT AXILLA: ICD-10-CM

## 2023-02-13 PROCEDURE — 76642 ULTRASOUND BREAST LIMITED: CPT | Mod: 26,50,, | Performed by: STUDENT IN AN ORGANIZED HEALTH CARE EDUCATION/TRAINING PROGRAM

## 2023-02-13 PROCEDURE — 76642 ULTRASOUND BREAST LIMITED: CPT | Mod: TC,50

## 2023-02-13 PROCEDURE — 77066 MAMMO DIGITAL DIAGNOSTIC BILAT WITH TOMO: ICD-10-PCS | Mod: 26,,, | Performed by: STUDENT IN AN ORGANIZED HEALTH CARE EDUCATION/TRAINING PROGRAM

## 2023-02-13 PROCEDURE — 77062 MAMMO DIGITAL DIAGNOSTIC BILAT WITH TOMO: ICD-10-PCS | Mod: 26,,, | Performed by: STUDENT IN AN ORGANIZED HEALTH CARE EDUCATION/TRAINING PROGRAM

## 2023-02-13 PROCEDURE — 77062 BREAST TOMOSYNTHESIS BI: CPT | Mod: 26,,, | Performed by: STUDENT IN AN ORGANIZED HEALTH CARE EDUCATION/TRAINING PROGRAM

## 2023-02-13 PROCEDURE — 76642 US BREAST BILATERAL LIMITED: ICD-10-PCS | Mod: 26,50,, | Performed by: STUDENT IN AN ORGANIZED HEALTH CARE EDUCATION/TRAINING PROGRAM

## 2023-02-13 PROCEDURE — 77062 BREAST TOMOSYNTHESIS BI: CPT | Mod: TC

## 2023-02-13 PROCEDURE — 77066 DX MAMMO INCL CAD BI: CPT | Mod: 26,,, | Performed by: STUDENT IN AN ORGANIZED HEALTH CARE EDUCATION/TRAINING PROGRAM

## 2023-04-12 ENCOUNTER — INFUSION (OUTPATIENT)
Dept: INFUSION THERAPY | Facility: HOSPITAL | Age: 53
End: 2023-04-12
Payer: MEDICAID

## 2023-04-12 VITALS
TEMPERATURE: 98 F | HEART RATE: 64 BPM | SYSTOLIC BLOOD PRESSURE: 100 MMHG | BODY MASS INDEX: 27.46 KG/M2 | WEIGHT: 155 LBS | HEIGHT: 63 IN | RESPIRATION RATE: 18 BRPM | OXYGEN SATURATION: 97 % | DIASTOLIC BLOOD PRESSURE: 57 MMHG

## 2023-04-12 DIAGNOSIS — Z95.828 PORT-A-CATH IN PLACE: Primary | ICD-10-CM

## 2023-04-12 PROCEDURE — 96523 IRRIG DRUG DELIVERY DEVICE: CPT

## 2023-04-12 PROCEDURE — 63600175 PHARM REV CODE 636 W HCPCS

## 2023-04-12 PROCEDURE — 25000003 PHARM REV CODE 250

## 2023-04-12 PROCEDURE — A4216 STERILE WATER/SALINE, 10 ML: HCPCS

## 2023-04-12 RX ORDER — HEPARIN 100 UNIT/ML
500 SYRINGE INTRAVENOUS
Status: CANCELLED | OUTPATIENT
Start: 2023-04-12

## 2023-04-12 RX ORDER — SODIUM CHLORIDE 0.9 % (FLUSH) 0.9 %
10 SYRINGE (ML) INJECTION
Status: ACTIVE | OUTPATIENT
Start: 2023-04-12

## 2023-04-12 RX ORDER — SODIUM CHLORIDE 0.9 % (FLUSH) 0.9 %
10 SYRINGE (ML) INJECTION
Status: CANCELLED | OUTPATIENT
Start: 2023-04-12

## 2023-04-12 RX ORDER — HEPARIN 100 UNIT/ML
500 SYRINGE INTRAVENOUS
Status: ACTIVE | OUTPATIENT
Start: 2023-04-12

## 2023-04-12 RX ADMIN — SODIUM CHLORIDE, PRESERVATIVE FREE 10 ML: 5 INJECTION INTRAVENOUS at 02:04

## 2023-04-12 RX ADMIN — HEPARIN 500 UNITS: 100 SYRINGE at 02:04

## 2023-07-06 ENCOUNTER — OFFICE VISIT (OUTPATIENT)
Dept: NEUROLOGY | Facility: CLINIC | Age: 53
End: 2023-07-06
Payer: MEDICAID

## 2023-07-06 DIAGNOSIS — R25.2 MUSCLE CRAMP: Primary | ICD-10-CM

## 2023-07-06 DIAGNOSIS — M65.4 DE QUERVAIN'S TENOSYNOVITIS, BILATERAL: ICD-10-CM

## 2023-07-06 PROCEDURE — 1159F MED LIST DOCD IN RCRD: CPT | Mod: CPTII,95,, | Performed by: NURSE PRACTITIONER

## 2023-07-06 PROCEDURE — 1160F PR REVIEW ALL MEDS BY PRESCRIBER/CLIN PHARMACIST DOCUMENTED: ICD-10-PCS | Mod: CPTII,95,, | Performed by: NURSE PRACTITIONER

## 2023-07-06 PROCEDURE — 1160F RVW MEDS BY RX/DR IN RCRD: CPT | Mod: CPTII,95,, | Performed by: NURSE PRACTITIONER

## 2023-07-06 PROCEDURE — 1159F PR MEDICATION LIST DOCUMENTED IN MEDICAL RECORD: ICD-10-PCS | Mod: CPTII,95,, | Performed by: NURSE PRACTITIONER

## 2023-07-06 PROCEDURE — 99214 PR OFFICE/OUTPT VISIT, EST, LEVL IV, 30-39 MIN: ICD-10-PCS | Mod: 95,,, | Performed by: NURSE PRACTITIONER

## 2023-07-06 PROCEDURE — 99214 OFFICE O/P EST MOD 30 MIN: CPT | Mod: 95,,, | Performed by: NURSE PRACTITIONER

## 2023-07-06 RX ORDER — LIDOCAINE 50 MG/G
1 PATCH TOPICAL
COMMUNITY
Start: 2023-05-15

## 2023-07-06 RX ORDER — MELOXICAM 15 MG/1
15 TABLET ORAL DAILY
COMMUNITY
Start: 2023-06-08

## 2023-07-06 RX ORDER — BACLOFEN 10 MG/1
10 TABLET ORAL DAILY PRN
Qty: 30 TABLET | Refills: 6 | Status: SHIPPED | OUTPATIENT
Start: 2023-07-06 | End: 2023-07-06

## 2023-07-06 RX ORDER — BACLOFEN 10 MG/1
10 TABLET ORAL DAILY PRN
Qty: 30 TABLET | Refills: 3 | Status: SHIPPED | OUTPATIENT
Start: 2023-07-06 | End: 2024-01-08 | Stop reason: SDUPTHER

## 2023-07-06 RX ORDER — ESCITALOPRAM OXALATE 20 MG/1
20 TABLET ORAL DAILY
COMMUNITY
Start: 2023-06-29

## 2023-07-06 NOTE — PROGRESS NOTES
[facility] Neurology Telemedicine Follow Up Visit Note    This is a real-time audio/video visit that was performed with the originating site at patient's home and the distant site, [facility]. Verbal consent to participate in interactive audio & video visit was obtained.    I discussed with the patient regarding the nature of our telehealth visits, that:    - Our sessions are not being recorded and that personal health information is protected  - Provider would evaluate the patient and recommend diagnostics and treatments based on my assessment  - [facility] will provide follow up care in person if/when the patient needs it.     Subjective:       Patient ID: Carlota Oakley is a 52 y.o. female.     Chief Complaint: Peripheral Neuropathy     HPI   This is a 53 year old right handed female with hx of HTN, Hypothyroidism, CKD III (GFR 50), Bipolar Disorder, Diffuse Large B Cell Lymphoma s/p R-CHOP (4/26/2018 - 8/9/2018) s/p Radiotherapy, who was referred for hand numbness after RCHOP. Patient noted that she has numbness and burning sensation in bilateral LE and UE along muscle cramps in her hands and feet after RCHOP. States that she had numbness in bilateral hands prior to RCHOP. She also notes radial wrist pain with exertion, which leads her to drop things. 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 Does not drink. Pt was last seen on 1/5/2023. During that visit medications were continued.     Today, Pt states tingling has improved. New c/o cramping to L lateral/frontal area of leg. States it is not new, but seems to be worsening. Denies swelling or change in color. Will have labs drawn next week. Takes chinyere nightly, daily dose of chinyere may consist of 100mg in AM and sometimes in afternoon. Works on her feel all day. 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 Continues w/hand tingling and numbness w/occasional cramping  into a claw. Difficulty w/holding objects as he hand will begin to cramp. Has never been evaluated by ortho.    Current Meds -  LTG 150mg daily  Neurontin 300mg QHS (10/5/2021 - present)  Neurontin 100-300mg in AM (3/29/2022 - present) - only takes PRN - effective     Review of Systems  Constitutional: no fever, fatigue, weakness  Eye: no vision loss, eye redness, drainage, or pain  ENMT: no sore throat, ear pain, sinus pain/congestion, nasal congestion/drainage  Respiratory: no cough, no wheezing, no shortness of breath  Cardiovascular: no chest pain, no palpitations, no edema  Gastrointestinal: no nausea, vomiting, or diarrhea. No abdominal pain  Genitourinary: no dysuria, no urinary frequency or urgency, no hematuria  Hema/Lymph: no abnormal bruising or bleeding  Endocrine: no heat or cold intolerance, no excessive thirst or excessive urination  Musculoskeletal: + muscle or joint pain, no joint swelling  Integumentary: no skin rash or abnormal lesion  Psychiatric: no depressed mood, + anxiety, no visual/auditory hallucinations, no delusions, no suicidal or homicidal ideation  Neurologic: as per HPI     Objective:   Physical Exam    General: well-developed well-nourished in no acute distress  Eye: clear conjunctiva, eyelids normal  HENT: oropharynx without erythema/exudate, oropharynx mucosal surface moist  Neck: full range of motion  Respiratory: no distress on RA  Cardiovascular: unable to assess due to nature of visit  Gastrointestinal: unable to assess due to nature of visit  Musculoskeletal: full range of motion of all extremities/spine without limitation or discomfort  Integumentary: no rashes or skin lesions present    Neurologic:  Mental Status- Alert and Oriented to time, self, place, Speech is fluent, with intact reading and comprehension, long term memory intact, No Dysarthria.  CN II-XII - CN I Deferred, No ptosis b/l, EOMI w/o nystagmus, T/U midline, Shoulder shrug symmetric b/l, Hearing grossly  intact b/l, Face Symmetric.  Motor - Tone and Bulk nml throughout, No involuntary movements, no satelliting with orbiting, FFM nml b/l, No pronator drift.  Sensory - unable to assess due to nature of visit  Reflexes - unable to assess due to nature of visit  Cerebellar Exam - FNF wnl b/l no intention tremor.  Romberg - mild sway  Gait - Normal, bilat arm swing intact    Tinels and Phalen's negative on 3/30/2021      Assessment:       1. Polyneuropathy G62.9     2. Tenosynovitis of thumb M65.9    3. Muscle cramp R25.2    4. De Quervain's tenosynovitis, bilateral M65.4      Plan:       This is a 53 year old right handed female with hx of HTN, Hypothyroidism, CKD III (GFR 50), Bipolar Disorder, Diffuse Large B Cell Lymphoma s/p R-CHOP (4/26/2018 - 8/9/2018) s/p Radiotherapy, who was referred for chemo related polyneuropathy. Also has a right wrist De Quervain Tenosynovitis, discomfort improved w/brace and gabapentin. Taking folic acid at HS, tolerating better. Notes improvement in neuropathy w/AM dosing of gabapentin, only takes PRN. Discussed hydration as she works in covered, un-air conditioned area. Numbness/tingling/cramping worse to hands. Sometimes cramps into claw-like formation. Has never been evaluated by ortho.     [] c/w LTG 150mg daily  [] c/w Neurontin 300mg qhs  [] c/w chinyere 100mg-200mg QAM, may increase up to 300mg;   [] start Baclofen 10mg 1 PO Qday PRN muscle cramp  [] c/w folic acid supplement at HS  [] keep hydrated  [] referral to orthopedics    RTC 6 months    I have explained the treatment plan, diagnosis, and prognosis to the patient, caretaker, family. All questions are answered to the best of my knowledge.    Face to Face Time 30 minute, including, counseling, education, review of test results, relevant medical records, and coordination of care.

## 2023-07-08 DIAGNOSIS — C83.30 DIFFUSE LARGE B-CELL LYMPHOMA, UNSPECIFIED BODY REGION: ICD-10-CM

## 2023-07-08 DIAGNOSIS — D53.9 MACROCYTIC ANEMIA: ICD-10-CM

## 2023-07-10 RX ORDER — FOLIC ACID 1 MG/1
TABLET ORAL
Qty: 90 TABLET | Refills: 3 | Status: SHIPPED | OUTPATIENT
Start: 2023-07-10

## 2023-07-24 ENCOUNTER — LAB VISIT (OUTPATIENT)
Dept: LAB | Facility: HOSPITAL | Age: 53
End: 2023-07-24
Payer: MEDICAID

## 2023-07-24 DIAGNOSIS — D75.89 MACROCYTOSIS: ICD-10-CM

## 2023-07-24 DIAGNOSIS — C83.30 DIFFUSE LARGE B-CELL LYMPHOMA, UNSPECIFIED BODY REGION: ICD-10-CM

## 2023-07-24 DIAGNOSIS — D64.9 ANEMIA, UNSPECIFIED TYPE: ICD-10-CM

## 2023-07-24 LAB
ALBUMIN SERPL-MCNC: 3.9 G/DL (ref 3.5–5)
ALBUMIN/GLOB SERPL: 1.2 RATIO (ref 1.1–2)
ALP SERPL-CCNC: 61 UNIT/L (ref 40–150)
ALT SERPL-CCNC: 8 UNIT/L (ref 0–55)
AST SERPL-CCNC: 17 UNIT/L (ref 5–34)
BASOPHILS # BLD AUTO: 0.02 X10(3)/MCL
BASOPHILS NFR BLD AUTO: 0.3 %
BILIRUBIN DIRECT+TOT PNL SERPL-MCNC: 0.2 MG/DL
BUN SERPL-MCNC: 18 MG/DL (ref 9.8–20.1)
CALCIUM SERPL-MCNC: 9.8 MG/DL (ref 8.4–10.2)
CHLORIDE SERPL-SCNC: 104 MMOL/L (ref 98–107)
CO2 SERPL-SCNC: 33 MMOL/L (ref 22–29)
CREAT SERPL-MCNC: 1.24 MG/DL (ref 0.55–1.02)
EOSINOPHIL # BLD AUTO: 0.22 X10(3)/MCL (ref 0–0.9)
EOSINOPHIL NFR BLD AUTO: 3.7 %
ERYTHROCYTE [DISTWIDTH] IN BLOOD BY AUTOMATED COUNT: 12.6 % (ref 11.5–17)
FERRITIN SERPL-MCNC: 250.44 NG/ML (ref 4.63–204)
FOLATE SERPL-MCNC: >80 NG/ML (ref 7–31.4)
GFR SERPLBLD CREATININE-BSD FMLA CKD-EPI: 52 MLS/MIN/1.73/M2
GLOBULIN SER-MCNC: 3.2 GM/DL (ref 2.4–3.5)
GLUCOSE SERPL-MCNC: 80 MG/DL (ref 74–100)
HCT VFR BLD AUTO: 38.7 % (ref 37–47)
HGB BLD-MCNC: 12.3 G/DL (ref 12–16)
IMM GRANULOCYTES # BLD AUTO: 0.01 X10(3)/MCL (ref 0–0.04)
IMM GRANULOCYTES NFR BLD AUTO: 0.2 %
IRON SATN MFR SERPL: 18 % (ref 20–50)
IRON SERPL-MCNC: 43 UG/DL (ref 50–170)
LDH SERPL-CCNC: 210 U/L (ref 125–220)
LYMPHOCYTES # BLD AUTO: 2.23 X10(3)/MCL (ref 0.6–4.6)
LYMPHOCYTES NFR BLD AUTO: 37.7 %
MCH RBC QN AUTO: 31.6 PG (ref 27–31)
MCHC RBC AUTO-ENTMCNC: 31.8 G/DL (ref 33–36)
MCV RBC AUTO: 99.5 FL (ref 80–94)
MONOCYTES # BLD AUTO: 0.44 X10(3)/MCL (ref 0.1–1.3)
MONOCYTES NFR BLD AUTO: 7.4 %
NEUTROPHILS # BLD AUTO: 2.99 X10(3)/MCL (ref 2.1–9.2)
NEUTROPHILS NFR BLD AUTO: 50.7 %
PLATELET # BLD AUTO: 171 X10(3)/MCL (ref 130–400)
PMV BLD AUTO: 9.7 FL (ref 7.4–10.4)
POTASSIUM SERPL-SCNC: 4.5 MMOL/L (ref 3.5–5.1)
PROT SERPL-MCNC: 7.1 GM/DL (ref 6.4–8.3)
RBC # BLD AUTO: 3.89 X10(6)/MCL (ref 4.2–5.4)
SODIUM SERPL-SCNC: 144 MMOL/L (ref 136–145)
TIBC SERPL-MCNC: 190 UG/DL (ref 70–310)
TIBC SERPL-MCNC: 233 UG/DL (ref 250–450)
VIT B12 SERPL-MCNC: 577 PG/ML (ref 213–816)
WBC # SPEC AUTO: 5.91 X10(3)/MCL (ref 4.5–11.5)

## 2023-07-24 PROCEDURE — 83550 IRON BINDING TEST: CPT

## 2023-07-24 PROCEDURE — 85025 COMPLETE CBC W/AUTO DIFF WBC: CPT

## 2023-07-24 PROCEDURE — 82746 ASSAY OF FOLIC ACID SERUM: CPT

## 2023-07-24 PROCEDURE — 80053 COMPREHEN METABOLIC PANEL: CPT

## 2023-07-24 PROCEDURE — 82607 VITAMIN B-12: CPT

## 2023-07-24 PROCEDURE — 36415 COLL VENOUS BLD VENIPUNCTURE: CPT

## 2023-07-24 PROCEDURE — 83615 LACTATE (LD) (LDH) ENZYME: CPT

## 2023-07-24 PROCEDURE — 82728 ASSAY OF FERRITIN: CPT

## 2023-07-26 ENCOUNTER — OFFICE VISIT (OUTPATIENT)
Dept: HEMATOLOGY/ONCOLOGY | Facility: CLINIC | Age: 53
End: 2023-07-26
Payer: MEDICAID

## 2023-07-26 ENCOUNTER — INFUSION (OUTPATIENT)
Dept: INFUSION THERAPY | Facility: HOSPITAL | Age: 53
End: 2023-07-26
Attending: UROLOGY
Payer: MEDICAID

## 2023-07-26 VITALS
OXYGEN SATURATION: 98 % | HEART RATE: 66 BPM | BODY MASS INDEX: 26.75 KG/M2 | WEIGHT: 151 LBS | HEIGHT: 63 IN | DIASTOLIC BLOOD PRESSURE: 58 MMHG | RESPIRATION RATE: 18 BRPM | TEMPERATURE: 99 F | SYSTOLIC BLOOD PRESSURE: 93 MMHG

## 2023-07-26 VITALS
DIASTOLIC BLOOD PRESSURE: 58 MMHG | HEART RATE: 66 BPM | RESPIRATION RATE: 18 BRPM | BODY MASS INDEX: 26.75 KG/M2 | TEMPERATURE: 99 F | OXYGEN SATURATION: 98 % | SYSTOLIC BLOOD PRESSURE: 93 MMHG | WEIGHT: 151 LBS | HEIGHT: 63 IN

## 2023-07-26 DIAGNOSIS — Z95.828 PORT-A-CATH IN PLACE: Primary | ICD-10-CM

## 2023-07-26 DIAGNOSIS — C83.30 DIFFUSE LARGE B-CELL LYMPHOMA, UNSPECIFIED BODY REGION: Primary | ICD-10-CM

## 2023-07-26 DIAGNOSIS — D53.9 MACROCYTIC ANEMIA: Primary | ICD-10-CM

## 2023-07-26 DIAGNOSIS — M13.0 POLYARTHRITIS: ICD-10-CM

## 2023-07-26 DIAGNOSIS — C83.30 DIFFUSE LARGE B-CELL LYMPHOMA, UNSPECIFIED BODY REGION: ICD-10-CM

## 2023-07-26 PROCEDURE — 3008F BODY MASS INDEX DOCD: CPT | Mod: CPTII,,, | Performed by: INTERNAL MEDICINE

## 2023-07-26 PROCEDURE — 3008F PR BODY MASS INDEX (BMI) DOCUMENTED: ICD-10-PCS | Mod: CPTII,,, | Performed by: INTERNAL MEDICINE

## 2023-07-26 PROCEDURE — 3074F PR MOST RECENT SYSTOLIC BLOOD PRESSURE < 130 MM HG: ICD-10-PCS | Mod: CPTII,,, | Performed by: INTERNAL MEDICINE

## 2023-07-26 PROCEDURE — 99999 PR PBB SHADOW E&M-EST. PATIENT-LVL V: ICD-10-PCS | Mod: PBBFAC,,, | Performed by: INTERNAL MEDICINE

## 2023-07-26 PROCEDURE — 3078F PR MOST RECENT DIASTOLIC BLOOD PRESSURE < 80 MM HG: ICD-10-PCS | Mod: CPTII,,, | Performed by: INTERNAL MEDICINE

## 2023-07-26 PROCEDURE — 3074F SYST BP LT 130 MM HG: CPT | Mod: CPTII,,, | Performed by: INTERNAL MEDICINE

## 2023-07-26 PROCEDURE — A4216 STERILE WATER/SALINE, 10 ML: HCPCS | Performed by: NURSE PRACTITIONER

## 2023-07-26 PROCEDURE — 96523 IRRIG DRUG DELIVERY DEVICE: CPT

## 2023-07-26 PROCEDURE — 99215 OFFICE O/P EST HI 40 MIN: CPT | Mod: PBBFAC | Performed by: INTERNAL MEDICINE

## 2023-07-26 PROCEDURE — 3078F DIAST BP <80 MM HG: CPT | Mod: CPTII,,, | Performed by: INTERNAL MEDICINE

## 2023-07-26 PROCEDURE — 25000003 PHARM REV CODE 250: Performed by: NURSE PRACTITIONER

## 2023-07-26 PROCEDURE — 99214 PR OFFICE/OUTPT VISIT, EST, LEVL IV, 30-39 MIN: ICD-10-PCS | Mod: S$PBB,,, | Performed by: INTERNAL MEDICINE

## 2023-07-26 PROCEDURE — 99214 OFFICE O/P EST MOD 30 MIN: CPT | Mod: S$PBB,,, | Performed by: INTERNAL MEDICINE

## 2023-07-26 PROCEDURE — 63600175 PHARM REV CODE 636 W HCPCS: Performed by: NURSE PRACTITIONER

## 2023-07-26 PROCEDURE — 99999 PR PBB SHADOW E&M-EST. PATIENT-LVL V: CPT | Mod: PBBFAC,,, | Performed by: INTERNAL MEDICINE

## 2023-07-26 RX ORDER — SODIUM CHLORIDE 0.9 % (FLUSH) 0.9 %
10 SYRINGE (ML) INJECTION
Status: DISCONTINUED | OUTPATIENT
Start: 2023-07-26 | End: 2023-07-26 | Stop reason: HOSPADM

## 2023-07-26 RX ORDER — HEPARIN 100 UNIT/ML
500 SYRINGE INTRAVENOUS
Status: CANCELLED | OUTPATIENT
Start: 2023-07-26

## 2023-07-26 RX ORDER — HEPARIN 100 UNIT/ML
500 SYRINGE INTRAVENOUS
Status: DISCONTINUED | OUTPATIENT
Start: 2023-07-26 | End: 2023-07-26 | Stop reason: HOSPADM

## 2023-07-26 RX ORDER — SODIUM CHLORIDE 0.9 % (FLUSH) 0.9 %
10 SYRINGE (ML) INJECTION
Status: CANCELLED | OUTPATIENT
Start: 2023-07-26

## 2023-07-26 RX ADMIN — HEPARIN 500 UNITS: 100 SYRINGE at 10:07

## 2023-07-26 RX ADMIN — SODIUM CHLORIDE, PRESERVATIVE FREE 10 ML: 5 INJECTION INTRAVENOUS at 10:07

## 2023-07-26 NOTE — PROGRESS NOTES
" [facility] Hematology/Oncology  PROGRESS NOTE      Subjective:         NAME: Carlota Oakley : 1970     53 y.o. female   MRN: [id number]    Referring Doc: No ref. provider found    Dr. Wisam Magaña NP      Chief Complaint:    No chief complaint on file.      Oncology/Hematology History:  1. DLBCL, stage II. Diag 3/2018  R submandibular LN biopsy 3/15/18: Diffuse large B cell lymphoma. Positive for CD45, CD20, BCL2, and BCL6; negative for CD3, CD10, CD56 and TTF-1. FISH studies: No evidence of MYC rearrangement, BCL2-IGH [translocation t(14;18)] gene rearrangement or BCL6 (3q27) breakpoint translocation.    Bone marrow biopsy 18: No evidence of malignancy.   R-CHOP x 6 cycles, 18 - 18  Consolidative radiotherapy 18-18    2. Bilateral breast masses  Biopsy R breast 2/15/18: Fibroadenoma  Biopsy L breast 18: Fibroadenoma    Clinical History:   Ms. Oakley is a 54 yo WF with PMH of bipolar disorder and HTN who was recently diagnosed with DLBCL. Pt initially noticed the "lump" under her R chin in early 2018. She went to urgent care and was prescribed a course of antibiotics. The mass didn't improve and she subsequently developed some swallowing difficulties. On 3/10/18 she was seen at [facility] and CT neck done there demonstrated a R submandibular mass measuring 4.6 x 2.6 x 2.5 cm as well as a mass invading into the R maxillary bone. It was around this time that she had a routine follow up with Dr. Wisam Eldridge (he was performing workup for bilateral breast masses) and he ordered a biopsy of the submandibular mass. Biopsy done on 3/15/18 confirmed diagnosis of diffuse large B cell lymphoma. IHC and FISH results noted above.     Interval History:  22 iron and irion sat low, ferritin is elevated,250 HGB 12.3, Cr 1.28. Sister was just diagnosed with AML and had a stem cell transplant. Patient complains of total body pain and has ongoing weight loss. Not impressive " "deformity of joints of fingers; will order RF and DANIEL, ESR and CRP today.   7/12/22MsManisha Oakley is here today alone for follow up.  She denies any n/v/d/c, abdominal pain, pruritus, fevers, abnormal lumps, or night sweats.She continues with gabapentin for neuropathy at bedtime. She does follow with neurology. She continues to smoke just under a pack a day. She continues with easy bruising, plts are WNL. She does report having pain to her left axilla that has been present for "a couple of weeks". She will be due for a mammo in feb.         ROS:   Review of Systems   Constitutional:  Negative for appetite change, chills, fatigue, fever and unexpected weight change.   HENT:  Negative for facial swelling, mouth sores, nosebleeds, sinus pressure/congestion and sore throat.    Eyes:  Negative for photophobia, pain and visual disturbance.   Respiratory:  Negative for cough, chest tightness, shortness of breath and wheezing.    Cardiovascular:  Negative for chest pain, palpitations and leg swelling.   Gastrointestinal:  Negative for abdominal pain, blood in stool, change in bowel habit, constipation, diarrhea, nausea, vomiting and change in bowel habit.   Endocrine: Negative.    Genitourinary:  Negative for dysuria, frequency, hematuria, hot flashes, pelvic pain, urgency and vaginal pain.   Musculoskeletal:  Positive for back pain (Chonic). Negative for arthralgias, gait problem, joint swelling and myalgias.   Integumentary:  Negative for pallor, rash, wound, breast mass, breast discharge and breast tenderness.        Left axillary pain    Allergic/Immunologic: Negative.  Negative for immunocompromised state.   Neurological:  Negative for dizziness, vertigo, syncope, weakness, numbness and headaches.   Hematological:  Negative for adenopathy. Does not bruise/bleed easily.   Psychiatric/Behavioral:  Negative for behavioral problems and dysphoric mood. The patient is nervous/anxious.    All other systems reviewed and are " negative.  Breast: Negative for mass and tenderness     Allergies:  Review of patient's allergies indicates:   Allergen Reactions    Penicillins Hives     Other reaction(s): rash       Medications:    Current Outpatient Medications:     baclofen (LIORESAL) 10 MG tablet, Take 1 tablet (10 mg total) by mouth daily as needed., Disp: 30 tablet, Rfl: 3    EScitalopram oxalate (LEXAPRO) 20 MG tablet, Take 20 mg by mouth once daily., Disp: , Rfl:     folic acid (FOLVITE) 1 MG tablet, TAKE 1 TABLET(1 MG) BY MOUTH EVERY DAY, Disp: 90 tablet, Rfl: 3    gabapentin (NEURONTIN) 100 MG capsule, Take 100 mg by mouth daily as needed., Disp: , Rfl:     gabapentin (NEURONTIN) 300 MG capsule, Take 1 capsule (300 mg total) by mouth every evening., Disp: 90 capsule, Rfl: 3    lamoTRIgine (LAMICTAL) 150 MG Tab, Take 1 tablet (150 mg total) by mouth once daily., Disp: 90 tablet, Rfl: 3    LIDOcaine (LIDODERM) 5 %, Place 1 patch onto the skin as needed., Disp: , Rfl:     lovastatin (MEVACOR) 20 MG tablet, TK 1 T PO QD WITH THE DANNA MEAL, Disp: , Rfl: 3    meloxicam (MOBIC) 15 MG tablet, Take 15 mg by mouth once daily., Disp: , Rfl:     traZODone (DESYREL) 50 MG tablet, Take 50 mg by mouth every evening., Disp: , Rfl:     ziprasidone (GEODON) 40 MG Cap, Take 40 mg by mouth every evening., Disp: , Rfl:     Current Facility-Administered Medications:     heparin, porcine (PF) 100 unit/mL injection flush 500 Units, 500 Units, Intravenous, PRN, Abstracted Order, MD, 500 Units at 10/13/22 1326    heparin, porcine (PF) 100 unit/mL injection flush 500 Units, 500 Units, Intravenous, PRN, Abstracted Order, MD    heparin, porcine (PF) 100 unit/mL injection flush 500 Units, 500 Units, Intravenous, PRN, Abstracted Order, MD, 500 Units at 01/12/23 1420    heparin, porcine (PF) 100 unit/mL injection flush 500 Units, 500 Units, Intravenous, PRN, Abstracted Order, MD, 500 Units at 04/12/23 1400    sodium chloride 0.9% flush 10 mL, 10 mL, Intravenous, PRN,  Abstracted MD Sisi, 10 mL at 10/13/22 1326    sodium chloride 0.9% flush 10 mL, 10 mL, Intravenous, PRN, Mandy Laird MD, 10 mL at 01/12/23 1420    sodium chloride 0.9% flush 10 mL, 10 mL, Intravenous, PRN, Mandy Laird MD    sodium chloride 0.9% flush 10 mL, 10 mL, Intravenous, PRN, Mandy Laird MD, 10 mL at 04/12/23 1400    PMHx/PSHx Updates:   Past Medical History:   Diagnosis Date    Cancer     Headache     Memory loss     Mental disorder     Movement disorder     Neuropathy      Past Surgical History:   Procedure Laterality Date    BONE MARROW BIOPSY      BREAST BIOPSY      COLONOSCOPY N/A 01/09/2020    CYSTOSCOPY      LYMPH NODE BIOPSY         Family History:  Family History   Problem Relation Age of Onset    Hypertension Mother     Diabetes Mother     Hypertension Father     Stroke Maternal Grandfather        Social History:  Social History     Socioeconomic History    Marital status: Single   Tobacco Use    Smoking status: Every Day     Packs/day: 1.00     Types: Cigarettes    Smokeless tobacco: Never   Substance and Sexual Activity    Alcohol use: Not Currently    Drug use: Yes     Frequency: 12.0 times per week     Types: Marijuana             Objective:     ECOG SCORE             Vitals:  There were no vitals taken for this visit.    Physical Examination:   Physical Exam  Vitals reviewed.   Constitutional:       General: She is not in acute distress.     Appearance: Normal appearance. She is normal weight.   HENT:      Head: Normocephalic and atraumatic.      Nose: Nose normal. No congestion or rhinorrhea.      Mouth/Throat:      Mouth: Mucous membranes are moist.      Pharynx: Oropharynx is clear. No oropharyngeal exudate or posterior oropharyngeal erythema.   Eyes:      Extraocular Movements: Extraocular movements intact.      Conjunctiva/sclera: Conjunctivae normal.      Pupils: Pupils are equal, round, and reactive to light.   Cardiovascular:      Rate and Rhythm: Normal rate and  regular rhythm.      Pulses: Normal pulses.      Heart sounds: Normal heart sounds. No murmur heard.    No friction rub. No gallop.   Pulmonary:      Effort: Pulmonary effort is normal. No respiratory distress.      Breath sounds: Normal breath sounds. No wheezing, rhonchi or rales.   Chest:      Comments: No adenopathy noted to bilateral breast or axilla   Abdominal:      General: Abdomen is flat. Bowel sounds are normal.      Palpations: Abdomen is soft. There is no mass.      Tenderness: There is no abdominal tenderness. There is no guarding.   Musculoskeletal:         General: No swelling, tenderness or signs of injury. Normal range of motion.      Cervical back: Normal range of motion and neck supple.      Right lower leg: No edema.      Left lower leg: No edema.   Lymphadenopathy:      Cervical: No cervical adenopathy.   Skin:     General: Skin is warm and dry.      Findings: No bruising, erythema, lesion or rash.   Neurological:      General: No focal deficit present.      Mental Status: She is alert and oriented to person, place, and time. Mental status is at baseline.      Cranial Nerves: No cranial nerve deficit.      Motor: No weakness.      Gait: Gait normal.   Psychiatric:         Mood and Affect: Mood normal.         Behavior: Behavior normal.         Thought Content: Thought content normal.         Judgment: Judgment normal.          Labs:   Lab Visit on 07/24/2023   Component Date Value Ref Range Status    Sodium Level 07/24/2023 144  136 - 145 mmol/L Final    Potassium Level 07/24/2023 4.5  3.5 - 5.1 mmol/L Final    Chloride 07/24/2023 104  98 - 107 mmol/L Final    Carbon Dioxide 07/24/2023 33 (H)  22 - 29 mmol/L Final    Glucose Level 07/24/2023 80  74 - 100 mg/dL Final    Blood Urea Nitrogen 07/24/2023 18.0  9.8 - 20.1 mg/dL Final    Creatinine 07/24/2023 1.24 (H)  0.55 - 1.02 mg/dL Final    Calcium Level Total 07/24/2023 9.8  8.4 - 10.2 mg/dL Final    Protein Total 07/24/2023 7.1  6.4 - 8.3 gm/dL  Final    Albumin Level 07/24/2023 3.9  3.5 - 5.0 g/dL Final    Globulin 07/24/2023 3.2  2.4 - 3.5 gm/dL Final    Albumin/Globulin Ratio 07/24/2023 1.2  1.1 - 2.0 ratio Final    Bilirubin Total 07/24/2023 0.2  <=1.5 mg/dL Final    Alkaline Phosphatase 07/24/2023 61  40 - 150 unit/L Final    Alanine Aminotransferase 07/24/2023 8  0 - 55 unit/L Final    Aspartate Aminotransferase 07/24/2023 17  5 - 34 unit/L Final    eGFR 07/24/2023 52  mls/min/1.73/m2 Final    Ferritin Level 07/24/2023 250.44 (H)  4.63 - 204.00 ng/mL Final    Folate Level 07/24/2023 >80.0 (H)  7.0 - 31.4 ng/mL Final    Vitamin B12 Level 07/24/2023 577  213 - 816 pg/mL Final    Iron Binding Capacity Unsaturated 07/24/2023 190  70 - 310 ug/dL Final    Iron Level 07/24/2023 43 (L)  50 - 170 ug/dL Final    Iron Binding Capacity Total 07/24/2023 233 (L)  250 - 450 ug/dL Final    Iron Saturation 07/24/2023 18 (L)  20 - 50 % Final    Lactate Dehydrogenase 07/24/2023 210  125 - 220 U/L Final    WBC 07/24/2023 5.91  4.50 - 11.50 x10(3)/mcL Final    RBC 07/24/2023 3.89 (L)  4.20 - 5.40 x10(6)/mcL Final    Hgb 07/24/2023 12.3  12.0 - 16.0 g/dL Final    Hct 07/24/2023 38.7  37.0 - 47.0 % Final    MCV 07/24/2023 99.5 (H)  80.0 - 94.0 fL Final    MCH 07/24/2023 31.6 (H)  27.0 - 31.0 pg Final    MCHC 07/24/2023 31.8 (L)  33.0 - 36.0 g/dL Final    RDW 07/24/2023 12.6  11.5 - 17.0 % Final    Platelet 07/24/2023 171  130 - 400 x10(3)/mcL Final    MPV 07/24/2023 9.7  7.4 - 10.4 fL Final    Neut % 07/24/2023 50.7  % Final    Lymph % 07/24/2023 37.7  % Final    Mono % 07/24/2023 7.4  % Final    Eos % 07/24/2023 3.7  % Final    Basophil % 07/24/2023 0.3  % Final    Lymph # 07/24/2023 2.23  0.6 - 4.6 x10(3)/mcL Final    Neut # 07/24/2023 2.99  2.1 - 9.2 x10(3)/mcL Final    Mono # 07/24/2023 0.44  0.1 - 1.3 x10(3)/mcL Final    Eos # 07/24/2023 0.22  0 - 0.9 x10(3)/mcL Final    Baso # 07/24/2023 0.02  <=0.2 x10(3)/mcL Final    IG# 07/24/2023 0.01  0 - 0.04 x10(3)/mcL Final     IG% 07/24/2023 0.2  % Final       Radiology/Diagnostic Studies:  CT neck 3/10/18: Indistinct soft tissue mass identified involving the right maxilla at the floor of the maxillary sinus and alveolar ridge. Matted adenopathy is identified in the R submandibular space measuring 4.6 x 2.6 x 2.5 cm.   2D echocardiogram 4/9/18: LVEF 62%   PET 4/19/18: Hypermetabolic erosive mass along the right maxillary floor/alveolar ridge. Right submandibular hypermetabolic lymph node conglomeration. Additional mildly prominent lymph nodes at left level IIa and in the low right paratracheal space with mild hypermetabolism. Nonspecific mild hypermetabolism along the posterior and posterolateral left sixth rib without correlating CT abnormality. Mild radiotracer activity associated with a lobulated soft tissue nodule in the retroareolar left breast, likely related to the recently biopsied lesion.   PET 6/20/18: Tumor regression. No new findings.   PET 8/28/18: Negative.   MRI brain 9/14/18: Mild generalized cerebral and cerebellar atrophy, normal for patient's age. Mucoperiosteal disease left maxillary sinus and ethmoid sinuses.   CT neck 11/1/18: Small lipoma at BB marker on the palpable right neck measuring up to 2.5cm.   PET 3/19/19: No evidence of hypermetabolic nika disease or suggestion of other FDG-avid or aggressive process through the head/neck, chest, abdomen, or pelvis.         I have reviewed all available lab results and radiology reports.    Assessment/Plan:   DLBCL (diffuse large B cell lymphoma) C83.30 MAITE x 5 years from end of chemotherapy  Pt with DLBCL, continues in remission. Unclear explanation for weight loss, has lost 10 more pounds since her last visit 6 months ago. . Will continue to follow. RTC 12 months or prn.  Labs are stable today and patient is a symptomatic. Continue routine follow-up.   Macrocytic anemia  Hx of macrocytosis and folate deficiency with normal vit B12.   Folate today > 80 improved from  5.2., asked her to take every other day  Neuropathy  Managed by neurology. Continue to follow with neurology   Hx Hematuria  Continue to follow with urology  Will check iron studies as well with next visit.   Left Axillary Pain   Due for mammogram in feb 2023. Will order today as well as US of left axilla to evaluate further.  6. One polyp on colonosopy 1-2 years ago (she is not sure) rec repeat in 5 year  UTD on MMG.  PLAN:  RF and DANIEL ESR and CRP today; telemed a week later to go over the results, also,   RTC 12 months w/ Cbc, CMP,  Folate, Vitamin B12, Iron studies, Ferritin  Discussion:     I have explained all of the above in detail and the patient understands all of the current recommendation(s). I have answered all of their questions to the best of my ability and to their complete satisfaction.   The patient is to continue with the current management plan.            Electronically signed by Francisca Brown MD

## 2023-08-04 ENCOUNTER — OFFICE VISIT (OUTPATIENT)
Dept: HEMATOLOGY/ONCOLOGY | Facility: CLINIC | Age: 53
End: 2023-08-04
Payer: MEDICAID

## 2023-08-04 DIAGNOSIS — C83.30 DIFFUSE LARGE B-CELL LYMPHOMA, UNSPECIFIED BODY REGION: Primary | ICD-10-CM

## 2023-08-04 DIAGNOSIS — D64.9 ANEMIA, UNSPECIFIED TYPE: ICD-10-CM

## 2023-08-04 DIAGNOSIS — D53.9 MACROCYTIC ANEMIA: ICD-10-CM

## 2023-08-04 DIAGNOSIS — M13.0 POLYARTHRITIS: ICD-10-CM

## 2023-08-04 PROCEDURE — 1159F MED LIST DOCD IN RCRD: CPT | Mod: CPTII,NDTC,, | Performed by: NURSE PRACTITIONER

## 2023-08-04 PROCEDURE — 99214 OFFICE O/P EST MOD 30 MIN: CPT | Mod: S$PBB,NDTC,, | Performed by: NURSE PRACTITIONER

## 2023-08-04 PROCEDURE — 1160F PR REVIEW ALL MEDS BY PRESCRIBER/CLIN PHARMACIST DOCUMENTED: ICD-10-PCS | Mod: CPTII,NDTC,, | Performed by: NURSE PRACTITIONER

## 2023-08-04 PROCEDURE — 1160F RVW MEDS BY RX/DR IN RCRD: CPT | Mod: CPTII,NDTC,, | Performed by: NURSE PRACTITIONER

## 2023-08-04 PROCEDURE — 99214 PR OFFICE/OUTPT VISIT, EST, LEVL IV, 30-39 MIN: ICD-10-PCS | Mod: S$PBB,NDTC,, | Performed by: NURSE PRACTITIONER

## 2023-08-04 PROCEDURE — 1159F PR MEDICATION LIST DOCUMENTED IN MEDICAL RECORD: ICD-10-PCS | Mod: CPTII,NDTC,, | Performed by: NURSE PRACTITIONER

## 2023-08-04 NOTE — PROGRESS NOTES
" [facility] Hematology/Oncology  PROGRESS NOTE      Subjective:         NAME: Carlota Oakley : 1970     53 y.o. female   MRN: [id number]    Referring Doc: No ref. provider found    Dr. Wisam Magaña NP      Chief Complaint:    Chief Complaint   Patient presents with    Lymphoma     Pt reports bilateral hand and leg pain as well as joint swelling in hands       Oncology/Hematology History:  1. DLBCL, stage II. Diag 3/2018  R submandibular LN biopsy 3/15/18: Diffuse large B cell lymphoma. Positive for CD45, CD20, BCL2, and BCL6; negative for CD3, CD10, CD56 and TTF-1. FISH studies: No evidence of MYC rearrangement, BCL2-IGH [translocation t(14;18)] gene rearrangement or BCL6 (3q27) breakpoint translocation.    Bone marrow biopsy 18: No evidence of malignancy.   R-CHOP x 6 cycles, 18 - 18  Consolidative radiotherapy 18-18    2. Bilateral breast masses  Biopsy R breast 2/15/18: Fibroadenoma  Biopsy L breast 18: Fibroadenoma    Clinical History:   Ms. Oakley is a 52 yo WF with PMH of bipolar disorder and HTN who was diagnosed with DLBCL. Pt initially noticed the "lump" under her R chin in early 2018. She went to urgent care and was prescribed a course of antibiotics. The mass didn't improve and she subsequently developed some swallowing difficulties. On 3/10/18 she was seen at [facility] and CT neck done there demonstrated a R submandibular mass measuring 4.6 x 2.6 x 2.5 cm as well as a mass invading into the R maxillary bone. It was around this time that she had a routine follow up with Dr. Wisam Eldridge (he was performing workup for bilateral breast masses) and he ordered a biopsy of the submandibular mass. Biopsy done on 3/15/18 confirmed diagnosis of diffuse large B cell lymphoma. IHC and FISH results noted above.     Interval History:    23: Ms. Oakley presents today for follow-up and discussion of her labs. She reports bilateral hand and leg pain. She has " "swelling in her joints in both hands. She has not seen ortho yet. She denies fevers, chills, night sweats, adenopathy. She has been increasing her calorie intake.    7/25/22 iron and irion sat low, ferritin is elevated,250 HGB 12.3, Cr 1.28. Sister was just diagnosed with AML and had a stem cell transplant. Patient complains of total body pain and has ongoing weight loss. Not impressive deformity of joints of fingers; will order RF and DANIEL, ESR and CRP today.     7/12/22MsManisha Oakley is here today alone for follow up.  She denies any n/v/d/c, abdominal pain, pruritus, fevers, abnormal lumps, or night sweats.She continues with gabapentin for neuropathy at bedtime. She does follow with neurology. She continues to smoke just under a pack a day. She continues with easy bruising, plts are WNL. She does report having pain to her left axilla that has been present for "a couple of weeks". She will be due for a mammo in feb.         ROS:   Review of Systems   Constitutional:  Negative for appetite change, chills, fatigue, fever and unexpected weight change.   HENT:  Negative for facial swelling, mouth sores, nosebleeds, sinus pressure/congestion and sore throat.    Eyes:  Negative for photophobia, pain and visual disturbance.   Respiratory:  Negative for cough, chest tightness, shortness of breath and wheezing.    Cardiovascular:  Negative for chest pain, palpitations and leg swelling.   Gastrointestinal:  Negative for abdominal pain, blood in stool, change in bowel habit, constipation, diarrhea, nausea, vomiting and change in bowel habit.   Endocrine: Negative.    Genitourinary:  Negative for dysuria, frequency, hematuria, hot flashes, pelvic pain, urgency and vaginal pain.   Musculoskeletal:  Positive for back pain (Chonic). Negative for arthralgias, gait problem, joint swelling and myalgias.   Integumentary:  Negative for pallor, rash, wound, breast mass, breast discharge and breast tenderness.        Left axillary pain  "   Allergic/Immunologic: Negative.  Negative for immunocompromised state.   Neurological:  Negative for dizziness, vertigo, syncope, weakness, numbness and headaches.   Hematological:  Negative for adenopathy. Does not bruise/bleed easily.   Psychiatric/Behavioral:  Negative for behavioral problems and dysphoric mood. The patient is nervous/anxious.    All other systems reviewed and are negative.  Breast: Negative for mass and tenderness       Allergies:  Review of patient's allergies indicates:   Allergen Reactions    Penicillins Hives     Other reaction(s): rash       Medications:    Current Outpatient Medications:     baclofen (LIORESAL) 10 MG tablet, Take 1 tablet (10 mg total) by mouth daily as needed., Disp: 30 tablet, Rfl: 3    EScitalopram oxalate (LEXAPRO) 20 MG tablet, Take 20 mg by mouth once daily., Disp: , Rfl:     folic acid (FOLVITE) 1 MG tablet, TAKE 1 TABLET(1 MG) BY MOUTH EVERY DAY, Disp: 90 tablet, Rfl: 3    gabapentin (NEURONTIN) 100 MG capsule, Take 100 mg by mouth daily as needed., Disp: , Rfl:     gabapentin (NEURONTIN) 300 MG capsule, Take 1 capsule (300 mg total) by mouth every evening., Disp: 90 capsule, Rfl: 3    lamoTRIgine (LAMICTAL) 150 MG Tab, Take 1 tablet (150 mg total) by mouth once daily., Disp: 90 tablet, Rfl: 3    LIDOcaine (LIDODERM) 5 %, Place 1 patch onto the skin as needed., Disp: , Rfl:     lovastatin (MEVACOR) 20 MG tablet, TK 1 T PO QD WITH THE DANNA MEAL, Disp: , Rfl: 3    meloxicam (MOBIC) 15 MG tablet, Take 15 mg by mouth once daily., Disp: , Rfl:     traZODone (DESYREL) 50 MG tablet, Take 50 mg by mouth every evening., Disp: , Rfl:     ziprasidone (GEODON) 40 MG Cap, Take 40 mg by mouth every evening., Disp: , Rfl:     Current Facility-Administered Medications:     heparin, porcine (PF) 100 unit/mL injection flush 500 Units, 500 Units, Intravenous, PRN, Order, MD Mandy, 500 Units at 10/13/22 1326    heparin, porcine (PF) 100 unit/mL injection flush 500 Units, 500  Units, Intravenous, PRN, OrderMandy MD    heparin, porcine (PF) 100 unit/mL injection flush 500 Units, 500 Units, Intravenous, PRN, Mandy Laird MD, 500 Units at 01/12/23 1420    heparin, porcine (PF) 100 unit/mL injection flush 500 Units, 500 Units, Intravenous, PRN, OrderMandy MD, 500 Units at 04/12/23 1400    sodium chloride 0.9% flush 10 mL, 10 mL, Intravenous, PRN, OrderMandy MD, 10 mL at 10/13/22 1326    sodium chloride 0.9% flush 10 mL, 10 mL, Intravenous, PRN, OrderMandy MD, 10 mL at 01/12/23 1420    sodium chloride 0.9% flush 10 mL, 10 mL, Intravenous, PRN, OrderMandy MD    sodium chloride 0.9% flush 10 mL, 10 mL, Intravenous, PRN, OrderMandy MD, 10 mL at 04/12/23 1400    PMHx/PSHx Updates:   Past Medical History:   Diagnosis Date    Cancer     Headache     Memory loss     Mental disorder     Movement disorder     Neuropathy      Past Surgical History:   Procedure Laterality Date    BONE MARROW BIOPSY      BREAST BIOPSY      COLONOSCOPY N/A 01/09/2020    CYSTOSCOPY      LYMPH NODE BIOPSY         Family History:  Family History   Problem Relation Age of Onset    Hypertension Mother     Diabetes Mother     Hypertension Father     Stroke Maternal Grandfather        Social History:  Social History     Socioeconomic History    Marital status: Single   Tobacco Use    Smoking status: Every Day     Current packs/day: 1.00     Types: Cigarettes    Smokeless tobacco: Never   Substance and Sexual Activity    Alcohol use: Not Currently    Drug use: Yes     Frequency: 12.0 times per week     Types: Marijuana       Vitals:  There were no vitals taken for this visit.    Physical Examination:   Unable to perform due to the nature of the visit.        Labs:   No visits with results within 1 Week(s) from this visit.   Latest known visit with results is:   Lab Visit on 07/26/2023   Component Date Value Ref Range Status    Sodium Level 07/26/2023 141  136 - 145 mmol/L Final     Potassium Level 07/26/2023 5.2 (H)  3.5 - 5.1 mmol/L Final    Chloride 07/26/2023 102  98 - 107 mmol/L Final    Carbon Dioxide 07/26/2023 33 (H)  22 - 29 mmol/L Final    Glucose Level 07/26/2023 83  74 - 100 mg/dL Final    Blood Urea Nitrogen 07/26/2023 21.0 (H)  9.8 - 20.1 mg/dL Final    Creatinine 07/26/2023 1.50 (H)  0.55 - 1.02 mg/dL Final    Calcium Level Total 07/26/2023 9.7  8.4 - 10.2 mg/dL Final    Protein Total 07/26/2023 7.5  6.4 - 8.3 gm/dL Final    Albumin Level 07/26/2023 4.2  3.5 - 5.0 g/dL Final    Globulin 07/26/2023 3.3  2.4 - 3.5 gm/dL Final    Albumin/Globulin Ratio 07/26/2023 1.3  1.1 - 2.0 ratio Final    Bilirubin Total 07/26/2023 0.6  <=1.5 mg/dL Final    Alkaline Phosphatase 07/26/2023 61  40 - 150 unit/L Final    Alanine Aminotransferase 07/26/2023 11  0 - 55 unit/L Final    Aspartate Aminotransferase 07/26/2023 20  5 - 34 unit/L Final    eGFR 07/26/2023 41  mls/min/1.73/m2 Final    Ferritin Level 07/26/2023 263.87 (H)  4.63 - 204.00 ng/mL Final    Vitamin B12 Level 07/26/2023 480  213 - 816 pg/mL Final    Folate Level 07/26/2023 34.1 (H)  7.0 - 31.4 ng/mL Final    Rheumatoid Factor Quantitative 07/26/2023 <13  <=30 IU/mL Final    Antinuclear Ab, HEp-2 Substrate 07/26/2023 <1:80 (Negative)  <1:80 (Negative) Final       -------------------ADDITIONAL INFORMATION-------------------  Method: Immunofluorescence using HEp-2 cellular substrate.     Test Performed by:  [facility]  [address] [place] [address]  : Khanh Taylor M.D. Ph.D.; CLIA# [id number]    Sed Rate 07/26/2023 23 (H)  0 - 20 mm/hr Final    C-Reactive Protein 07/26/2023 8.60 (H)  <5.00 mg/L Final    WBC 07/26/2023 5.81  4.50 - 11.50 x10(3)/mcL Final    RBC 07/26/2023 3.73 (L)  4.20 - 5.40 x10(6)/mcL Final    Hgb 07/26/2023 11.7 (L)  12.0 - 16.0 g/dL Final    Hct 07/26/2023 37.0  37.0 - 47.0 % Final    MCV 07/26/2023 99.2 (H)  80.0 - 94.0 fL Final    MCH  07/26/2023 31.4 (H)  27.0 - 31.0 pg Final    MCHC 07/26/2023 31.6 (L)  33.0 - 36.0 g/dL Final    RDW 07/26/2023 12.5  11.5 - 17.0 % Final    Platelet 07/26/2023 174  130 - 400 x10(3)/mcL Final    MPV 07/26/2023 9.9  7.4 - 10.4 fL Final    Neut % 07/26/2023 53.4  % Final    Lymph % 07/26/2023 34.9  % Final    Mono % 07/26/2023 7.6  % Final    Eos % 07/26/2023 3.4  % Final    Basophil % 07/26/2023 0.5  % Final    Lymph # 07/26/2023 2.03  0.6 - 4.6 x10(3)/mcL Final    Neut # 07/26/2023 3.10  2.1 - 9.2 x10(3)/mcL Final    Mono # 07/26/2023 0.44  0.1 - 1.3 x10(3)/mcL Final    Eos # 07/26/2023 0.20  0 - 0.9 x10(3)/mcL Final    Baso # 07/26/2023 0.03  <=0.2 x10(3)/mcL Final    IG# 07/26/2023 0.01  0 - 0.04 x10(3)/mcL Final    IG% 07/26/2023 0.2  % Final    Iron Binding Capacity Unsaturated 07/26/2023 176  70 - 310 ug/dL Final    Iron Level 07/26/2023 61  50 - 170 ug/dL Final    Iron Binding Capacity Total 07/26/2023 237 (L)  250 - 450 ug/dL Final    Iron Saturation 07/26/2023 26  20 - 50 % Final       Radiology/Diagnostic Studies:  CT neck 3/10/18: Indistinct soft tissue mass identified involving the right maxilla at the floor of the maxillary sinus and alveolar ridge. Matted adenopathy is identified in the R submandibular space measuring 4.6 x 2.6 x 2.5 cm.   2D echocardiogram 4/9/18: LVEF 62%   PET 4/19/18: Hypermetabolic erosive mass along the right maxillary floor/alveolar ridge. Right submandibular hypermetabolic lymph node conglomeration. Additional mildly prominent lymph nodes at left level IIa and in the low right paratracheal space with mild hypermetabolism. Nonspecific mild hypermetabolism along the posterior and posterolateral left sixth rib without correlating CT abnormality. Mild radiotracer activity associated with a lobulated soft tissue nodule in the retroareolar left breast, likely related to the recently biopsied lesion.   PET 6/20/18: Tumor regression. No new findings.   PET 8/28/18: Negative.   MRI  brain 9/14/18: Mild generalized cerebral and cerebellar atrophy, normal for patient's age. Mucoperiosteal disease left maxillary sinus and ethmoid sinuses.   CT neck 11/1/18: Small lipoma at BB marker on the palpable right neck measuring up to 2.5cm.   PET 3/19/19: No evidence of hypermetabolic nika disease or suggestion of other FDG-avid or aggressive process through the head/neck, chest, abdomen, or pelvis.         I have reviewed all available lab results and radiology reports.    Assessment/Plan:   DLBCL (diffuse large B cell lymphoma) C83.30 MAITE x 5 years from end of chemotherapy  Pt with DLBCL, patient see by Dr. Wallace  last week and noted the patient continues in remission. Will continue to follow. RTC 12 months or prn.  Labs are stable today and patient is asymptomatic. Continue routine follow-up.   Macrocytic anemia  Hx of macrocytosis and folate deficiency with normal vit B12.   Folate today 34.1 continue to take every other day  Neuropathy  Managed by neurology. Continue to follow with neurology   Hx Hematuria  Continue to follow with urology  Iron stores are replete.   Left Axillary Pain   Mammogram in feb 2023 was negative. F/u due Feb 2024  6. One polyp on colonosopy 1-2 years ago (she is not sure) rec repeat in 5 year    Bone and joint pain (polyarthritis) :   RF and DANIEL negative, ESR  and CRP minmally elevated. She has been following with neurology and will continue gabapentin 300mg nightly, and  daily dose of chinyere which consists of 100mg in AM and sometimes in afternoon. She was started on  Baclofen 10mg 1 PO Qday PRN muscle cramp, and referred to ortho which she will make an appointment.     Informed patient to call if she has s/s of lymphoma recurrence including adenopathy, fevers, night sweats, ongoing weightloss etc.     RTC 12 months w/ Cbc, CMP,  Folate, Vitamin B12, Iron studies, Ferritin  Discussion:     I have explained all of the above in detail and the patient understands all of the  current recommendation(s). I have answered all of their questions to the best of my ability and to their complete satisfaction.   The patient is to continue with the current management plan.            Electronically signed by Paula Ferreira NP

## 2023-09-26 ENCOUNTER — TELEPHONE (OUTPATIENT)
Dept: NEUROLOGY | Facility: CLINIC | Age: 53
End: 2023-09-26
Payer: MEDICAID

## 2023-09-26 DIAGNOSIS — R25.2 MUSCLE CRAMP: ICD-10-CM

## 2023-09-26 DIAGNOSIS — M65.4 DE QUERVAIN'S TENOSYNOVITIS, BILATERAL: Primary | ICD-10-CM

## 2023-09-26 NOTE — TELEPHONE ENCOUNTER
----- Message from Rosa Hernandez sent at 9/26/2023  8:35 AM CDT -----  Regarding: Please call pt  Pt called and stated she recv's a v/m from KEELEY Soler about xrays for her hand. Pt is asking that an xray of her knee be added as she fell in the beginning of the year and her knee is still numb. Pt can be reached @ [phone]          Thank You  Yue PEARSON

## 2023-09-26 NOTE — TELEPHONE ENCOUNTER
----- Message from Jamison Slater LPN sent at 8/1/2023  9:19 AM CDT -----  Regarding: Orthopedic referral  Referral reviewed by DEVIKA Garcia, denied due to no XR.

## 2023-10-16 ENCOUNTER — INFUSION (OUTPATIENT)
Dept: INFUSION THERAPY | Facility: HOSPITAL | Age: 53
End: 2023-10-16
Payer: MEDICAID

## 2023-10-16 VITALS
WEIGHT: 152.81 LBS | OXYGEN SATURATION: 98 % | RESPIRATION RATE: 18 BRPM | HEART RATE: 75 BPM | DIASTOLIC BLOOD PRESSURE: 63 MMHG | TEMPERATURE: 98 F | HEIGHT: 63 IN | BODY MASS INDEX: 27.07 KG/M2 | SYSTOLIC BLOOD PRESSURE: 102 MMHG

## 2023-10-16 DIAGNOSIS — Z95.828 PORT-A-CATH IN PLACE: Primary | ICD-10-CM

## 2023-10-16 PROCEDURE — A4216 STERILE WATER/SALINE, 10 ML: HCPCS | Performed by: NURSE PRACTITIONER

## 2023-10-16 PROCEDURE — 96523 IRRIG DRUG DELIVERY DEVICE: CPT

## 2023-10-16 PROCEDURE — 63600175 PHARM REV CODE 636 W HCPCS: Performed by: NURSE PRACTITIONER

## 2023-10-16 PROCEDURE — 25000003 PHARM REV CODE 250: Performed by: NURSE PRACTITIONER

## 2023-10-16 RX ORDER — HEPARIN 100 UNIT/ML
500 SYRINGE INTRAVENOUS
Status: DISCONTINUED | OUTPATIENT
Start: 2023-10-16 | End: 2023-10-16 | Stop reason: HOSPADM

## 2023-10-16 RX ORDER — SODIUM CHLORIDE 0.9 % (FLUSH) 0.9 %
10 SYRINGE (ML) INJECTION
Status: CANCELLED | OUTPATIENT
Start: 2023-10-16

## 2023-10-16 RX ORDER — HEPARIN 100 UNIT/ML
500 SYRINGE INTRAVENOUS
Status: CANCELLED | OUTPATIENT
Start: 2023-10-16

## 2023-10-16 RX ORDER — SODIUM CHLORIDE 0.9 % (FLUSH) 0.9 %
10 SYRINGE (ML) INJECTION
Status: DISCONTINUED | OUTPATIENT
Start: 2023-10-16 | End: 2023-10-16 | Stop reason: HOSPADM

## 2023-10-16 RX ADMIN — HEPARIN 500 UNITS: 100 SYRINGE at 12:10

## 2023-10-16 RX ADMIN — SODIUM CHLORIDE, PRESERVATIVE FREE 10 ML: 5 INJECTION INTRAVENOUS at 12:10

## 2023-10-16 NOTE — PLAN OF CARE
Patient will have no s/s of infection to mediport site. Will perform aseptic technique when accessing and flushing    
no

## 2023-10-18 RX ORDER — GABAPENTIN 100 MG/1
CAPSULE ORAL
Qty: 90 CAPSULE | Refills: 3 | Status: SHIPPED | OUTPATIENT
Start: 2023-10-18 | End: 2024-01-08 | Stop reason: SDUPTHER

## 2023-11-29 DIAGNOSIS — C83.30 DIFFUSE LARGE B-CELL LYMPHOMA, UNSPECIFIED BODY REGION: Primary | ICD-10-CM

## 2023-12-05 DIAGNOSIS — Z12.31 OTHER SCREENING MAMMOGRAM: Primary | ICD-10-CM

## 2023-12-06 ENCOUNTER — HOSPITAL ENCOUNTER (OUTPATIENT)
Dept: RADIOLOGY | Facility: HOSPITAL | Age: 53
Discharge: HOME | End: 2023-12-06
Payer: MEDICAID

## 2023-12-06 DIAGNOSIS — C83.30 DIFFUSE LARGE B-CELL LYMPHOMA, UNSPECIFIED BODY REGION: ICD-10-CM

## 2023-12-06 PROCEDURE — A9552 F18 FDG: HCPCS

## 2023-12-07 DIAGNOSIS — D64.9 ANEMIA, UNSPECIFIED TYPE: ICD-10-CM

## 2023-12-07 DIAGNOSIS — C83.30 DIFFUSE LARGE B-CELL LYMPHOMA, UNSPECIFIED BODY REGION: Primary | ICD-10-CM

## 2023-12-11 NOTE — PROGRESS NOTES
" [facility]  PROGRESS NOTE      Subjective:         NAME: Carlota Oakley : 1970     53 y.o. female   MRN: [id number]    Referring Doc: No ref. provider found    Dr. Wisam Magaña NP      Chief Complaint:    Chief Complaint   Patient presents with    Lymphoma     Pt reports increase neuropathy in right leg. Pt reports multiple falls in past few months. Last fall was in September. Pt reports feeling dizzy and difficulty with legs.        Oncology/Hematology History:  1. DLBCL, stage II. Diag 3/2018  R submandibular LN biopsy 3/15/18: Diffuse large B cell lymphoma. Positive for CD45, CD20, BCL2, and BCL6; negative for CD3, CD10, CD56 and TTF-1. FISH studies: No evidence of MYC rearrangement, BCL2-IGH [translocation t(14;18)] gene rearrangement or BCL6 (3q27) breakpoint translocation.    Bone marrow biopsy 18: No evidence of malignancy.   R-CHOP x 6 cycles, 18 - 18  Consolidative radiotherapy 18-18    2. Bilateral breast masses  Biopsy R breast 2/15/18: Fibroadenoma  Biopsy L breast 18: Fibroadenoma    Clinical History:   Ms. Oakley is a 52 yo WF with PMH of bipolar disorder and HTN who was diagnosed with DLBCL. Pt initially noticed the "lump" under her R chin in early 2018. She went to urgent care and was prescribed a course of antibiotics. The mass didn't improve and she subsequently developed some swallowing difficulties. On 3/10/18 she was seen at [facility] and CT neck done there demonstrated a R submandibular mass measuring 4.6 x 2.6 x 2.5 cm as well as a mass invading into the R maxillary bone. It was around this time that she had a routine follow up with Dr. Wisam Eldridge (he was performing workup for bilateral breast masses) and he ordered a biopsy of the submandibular mass. Biopsy done on 3/15/18 confirmed diagnosis of diffuse large B cell lymphoma. IHC and FISH results noted above.     Interval History:  Ms. Oakley presents today for " follow-up with PET/CT results and discussion of her labs. She reports bilateral hand and leg pain. She has swelling in her joints in both hands. She has not had a follow up with ortho in a long time, she will be calling to make an appointment. She reports bilateral lower and upper extremity neuropathy. She is followed by neurology. She has had 4-5 falls within the last 6 months, she said most of them are related to her feeling dizzy. She does report night sweats. Her weight goes ups and down and this has been happening since she has the chemotherapy.  She denies SOB, chest pain, fevers, adenopathy. She take geodon and balofen which make her feel fatigue.    ROS:   Review of Systems   Constitutional:  Negative for appetite change, chills, fatigue, fever and unexpected weight change.   HENT:  Negative for facial swelling, mouth sores, nosebleeds, sinus pressure/congestion and sore throat.    Eyes:  Negative for photophobia, pain and visual disturbance.   Respiratory:  Negative for cough, chest tightness, shortness of breath and wheezing.    Cardiovascular:  Negative for chest pain, palpitations and leg swelling.   Gastrointestinal:  Negative for abdominal pain, blood in stool, change in bowel habit, constipation, diarrhea, nausea and vomiting.   Endocrine: Negative.    Genitourinary:  Negative for dysuria, frequency, hematuria, hot flashes, pelvic pain, urgency and vaginal pain.   Musculoskeletal:  Positive for back pain (Chonic). Negative for arthralgias, gait problem, joint swelling and myalgias.   Integumentary:  Negative for pallor, rash, wound, breast mass, breast discharge and breast tenderness.        Left axillary pain    Allergic/Immunologic: Negative.  Negative for immunocompromised state.   Neurological:  Negative for dizziness, vertigo, syncope, weakness, numbness and headaches.   Hematological:  Negative for adenopathy. Does not bruise/bleed easily.   Psychiatric/Behavioral:  Negative for behavioral  problems and dysphoric mood. The patient is nervous/anxious.    All other systems reviewed and are negative.  Breast: Negative for mass and tenderness       Allergies:  Review of patient's allergies indicates:   Allergen Reactions    Penicillins Hives     Other reaction(s): rash       Medications:    Current Outpatient Medications:     baclofen (LIORESAL) 10 MG tablet, Take 1 tablet (10 mg total) by mouth daily as needed., Disp: 30 tablet, Rfl: 3    EScitalopram oxalate (LEXAPRO) 20 MG tablet, Take 20 mg by mouth once daily., Disp: , Rfl:     folic acid (FOLVITE) 1 MG tablet, TAKE 1 TABLET(1 MG) BY MOUTH EVERY DAY, Disp: 90 tablet, Rfl: 3    gabapentin (NEURONTIN) 100 MG capsule, TAKE 1 CAPSULE BY MOUTH THREE TIMES DAILY( CONTINUE WITH 300 MG AT BEDTIME), Disp: 90 capsule, Rfl: 3    gabapentin (NEURONTIN) 300 MG capsule, Take 1 capsule (300 mg total) by mouth every evening., Disp: 90 capsule, Rfl: 3    lamoTRIgine (LAMICTAL) 150 MG Tab, Take 1 tablet (150 mg total) by mouth once daily., Disp: 90 tablet, Rfl: 3    LIDOcaine (LIDODERM) 5 %, Place 1 patch onto the skin as needed., Disp: , Rfl:     lovastatin (MEVACOR) 20 MG tablet, TK 1 T PO QD WITH THE DANNA MEAL, Disp: , Rfl: 3    meloxicam (MOBIC) 15 MG tablet, Take 15 mg by mouth once daily., Disp: , Rfl:     traZODone (DESYREL) 50 MG tablet, Take 50 mg by mouth every evening., Disp: , Rfl:     ziprasidone (GEODON) 40 MG Cap, Take 40 mg by mouth every evening., Disp: , Rfl:     Current Facility-Administered Medications:     heparin, porcine (PF) 100 unit/mL injection flush 500 Units, 500 Units, Intravenous, PRN, OrderMandy MD, 500 Units at 10/13/22 1326    heparin, porcine (PF) 100 unit/mL injection flush 500 Units, 500 Units, Intravenous, PRN, OrderMandy MD    heparin, porcine (PF) 100 unit/mL injection flush 500 Units, 500 Units, Intravenous, PRN, OrderMandy MD, 500 Units at 01/12/23 1420    heparin, porcine (PF) 100 unit/mL injection flush  "500 Units, 500 Units, Intravenous, PRN, OrderMandy MD, 500 Units at 04/12/23 1400    sodium chloride 0.9% flush 10 mL, 10 mL, Intravenous, PRN, Mandy Laird MD, 10 mL at 10/13/22 1326    sodium chloride 0.9% flush 10 mL, 10 mL, Intravenous, PRN, OrderMandy MD, 10 mL at 01/12/23 1420    sodium chloride 0.9% flush 10 mL, 10 mL, Intravenous, PRN, Mandy Laird MD    sodium chloride 0.9% flush 10 mL, 10 mL, Intravenous, PRN, OrderMandy MD, 10 mL at 04/12/23 1400    PMHx/PSHx Updates:   Past Medical History:   Diagnosis Date    Cancer     Headache     Memory loss     Mental disorder     Movement disorder     Neuropathy      Past Surgical History:   Procedure Laterality Date    BONE MARROW BIOPSY      BREAST BIOPSY      COLONOSCOPY N/A 01/09/2020    CYSTOSCOPY      LYMPH NODE BIOPSY         Family History:  Family History   Problem Relation Age of Onset    Hypertension Mother     Diabetes Mother     Hypertension Father     Stroke Maternal Grandfather        Social History:  Social History     Socioeconomic History    Marital status: Single   Tobacco Use    Smoking status: Every Day     Current packs/day: 1.00     Types: Cigarettes    Smokeless tobacco: Never   Substance and Sexual Activity    Alcohol use: Not Currently    Drug use: Yes     Frequency: 12.0 times per week     Types: Marijuana       Vitals:  Blood pressure 90/60, pulse 74, temperature 98.2 °F (36.8 °C), resp. rate 18, height 5' 2.99" (1.6 m), weight 66.2 kg (146 lb), SpO2 100 %.    Physical Examination:   Physical Exam  Vitals and nursing note reviewed.   Constitutional:       Appearance: Normal appearance.   HENT:      Head: Normocephalic and atraumatic.   Eyes:      General: No scleral icterus.     Extraocular Movements: Extraocular movements intact.      Conjunctiva/sclera: Conjunctivae normal.   Neck:      Vascular: No JVD.   Cardiovascular:      Rate and Rhythm: Normal rate and regular rhythm.      Heart sounds: No " murmur heard.  Pulmonary:      Effort: Pulmonary effort is normal.      Breath sounds: Normal breath sounds. No wheezing or rhonchi.   Abdominal:      General: Bowel sounds are normal. There is no distension.      Palpations: Abdomen is soft.      Tenderness: There is no abdominal tenderness.   Musculoskeletal:      Cervical back: Neck supple.   Lymphadenopathy:      Head:      Right side of head: No submental or submandibular adenopathy.      Left side of head: No submental or submandibular adenopathy.      Cervical: No cervical adenopathy.      Upper Body:      Right upper body: No supraclavicular or axillary adenopathy.      Left upper body: No supraclavicular or axillary adenopathy.      Lower Body: No right inguinal adenopathy. No left inguinal adenopathy.   Skin:     General: Skin is warm.      Coloration: Skin is not jaundiced.      Findings: No lesion or rash.   Neurological:      General: No focal deficit present.      Mental Status: She is alert and oriented to person, place, and time.      Cranial Nerves: Cranial nerves 2-12 are intact.   Psychiatric:         Attention and Perception: Attention normal.         Speech: Speech normal.         Behavior: Behavior is cooperative.         Cognition and Memory: Cognition normal.         Judgment: Judgment normal.             Labs:   Lab Visit on 12/12/2023   Component Date Value Ref Range Status    WBC 12/12/2023 7.00  4.50 - 11.50 x10(3)/mcL Final    RBC 12/12/2023 4.20  4.20 - 5.40 x10(6)/mcL Final    Hgb 12/12/2023 13.1  12.0 - 16.0 g/dL Final    Hct 12/12/2023 41.0  37.0 - 47.0 % Final    MCV 12/12/2023 97.6 (H)  80.0 - 94.0 fL Final    MCH 12/12/2023 31.2 (H)  27.0 - 31.0 pg Final    MCHC 12/12/2023 32.0 (L)  33.0 - 36.0 g/dL Final    RDW 12/12/2023 12.1  11.5 - 17.0 % Final    Platelet 12/12/2023 194  130 - 400 x10(3)/mcL Final    MPV 12/12/2023 9.9  7.4 - 10.4 fL Final    Neut % 12/12/2023 65.6  % Final    Lymph % 12/12/2023 23.1  % Final    Mono %  12/12/2023 7.4  % Final    Eos % 12/12/2023 3.0  % Final    Basophil % 12/12/2023 0.6  % Final    Lymph # 12/12/2023 1.62  0.6 - 4.6 x10(3)/mcL Final    Neut # 12/12/2023 4.59  2.1 - 9.2 x10(3)/mcL Final    Mono # 12/12/2023 0.52  0.1 - 1.3 x10(3)/mcL Final    Eos # 12/12/2023 0.21  0 - 0.9 x10(3)/mcL Final    Baso # 12/12/2023 0.04  <=0.2 x10(3)/mcL Final    IG# 12/12/2023 0.02  0 - 0.04 x10(3)/mcL Final    IG% 12/12/2023 0.3  % Final       Radiology/Diagnostic Studies:  CT neck 3/10/18: Indistinct soft tissue mass identified involving the right maxilla at the floor of the maxillary sinus and alveolar ridge. Matted adenopathy is identified in the R submandibular space measuring 4.6 x 2.6 x 2.5 cm.   2D echocardiogram 4/9/18: LVEF 62%   PET 4/19/18: Hypermetabolic erosive mass along the right maxillary floor/alveolar ridge. Right submandibular hypermetabolic lymph node conglomeration. Additional mildly prominent lymph nodes at left level IIa and in the low right paratracheal space with mild hypermetabolism. Nonspecific mild hypermetabolism along the posterior and posterolateral left sixth rib without correlating CT abnormality. Mild radiotracer activity associated with a lobulated soft tissue nodule in the retroareolar left breast, likely related to the recently biopsied lesion.   PET 6/20/18: Tumor regression. No new findings.   PET 8/28/18: Negative.   MRI brain 9/14/18: Mild generalized cerebral and cerebellar atrophy, normal for patient's age. Mucoperiosteal disease left maxillary sinus and ethmoid sinuses.   CT neck 11/1/18: Small lipoma at BB marker on the palpable right neck measuring up to 2.5cm.   PET 3/19/19: No evidence of hypermetabolic nika disease or suggestion of other FDG-avid or aggressive process through the head/neck, chest, abdomen, or pelvis.   PET 12/6/23: Asymmetric left tonsillar hypermetabolism with mild asymmetry on the CT images, nonspecific.  Correlation with direct visualization can be  considered.  Otherwise, no tracer avid metastatic disease identified      I have reviewed all available lab results and radiology reports.    Assessment/Plan:   DLBCL (diffuse large B cell lymphoma) C83.30 MAITE x 5 years from end of chemotherapy  Pt with DLBCL, patient see by Dr. Wallace  last week and noted the patient continues in remission. Will continue to follow. RTC 12 months or prn.  Labs are stable today and patient is asymptomatic. Continue routine follow-up.   Macrocytic anemia  Hx of macrocytosis and folate deficiency with normal vit B12.   Folate today 34.1 continue to take every other day  Neuropathy  Managed by neurology. Continue to follow with neurology   Hx Hematuria  Continue to follow with urology  Iron stores are replete.   Left Axillary Pain   Mammogram in feb 2023 was negative. F/u due Feb 2024  6. One polyp on colonosopy 1-2 years ago (she is not sure) rec repeat in 5 year    Bone and joint pain (polyarthritis) :   RF and DANIEL negative, ESR  and CRP minmally elevated. She has been following with neurology and will continue gabapentin 300mg nightly, and  daily dose of chinyere which consists of 100mg in AM and sometimes in afternoon. She was started on  Baclofen 10mg 1 PO Qday PRN muscle cramp, and referred to ortho which she will make an appointment.     Informed patient to call if she has s/s of lymphoma recurrence including adenopathy, fevers, night sweats, ongoing weightloss etc.       Discussion:   Patient with no LAD on physical exam. She does reports off and on sweats and weight changes since she has her chemotherapy but PET CT with tonsillar hypermetabolism. This need to be evaluated further.     Refer to Dr. Springer ENT for  Asymmetric left tonsillar hypermetabolism with mild asymmetry on the CT images, nonspecific.  Correlation with direct visualization can be considered.  RTC 5 weeks with MD after ENT eval for more definite recommendations.  No labs    I have explained all of the above in  detail and the patient understands all of the current recommendation(s). I have answered all of their questions to the best of my ability and to their complete satisfaction.   The patient is to continue with the current management plan.      Radha Melissa MD  Hematology/Oncology  [facility]       Professional Services   I, Jeni Tristan LPN, acted solely as a scribe for and in the presence of Dr. Radha Melissa, who performed these services.

## 2023-12-12 ENCOUNTER — OFFICE VISIT (OUTPATIENT)
Dept: HEMATOLOGY/ONCOLOGY | Facility: CLINIC | Age: 53
End: 2023-12-12
Payer: MEDICAID

## 2023-12-12 VITALS
OXYGEN SATURATION: 100 % | TEMPERATURE: 98 F | HEIGHT: 63 IN | BODY MASS INDEX: 25.87 KG/M2 | SYSTOLIC BLOOD PRESSURE: 90 MMHG | RESPIRATION RATE: 18 BRPM | WEIGHT: 146 LBS | HEART RATE: 74 BPM | DIASTOLIC BLOOD PRESSURE: 60 MMHG

## 2023-12-12 DIAGNOSIS — C83.30 DIFFUSE LARGE B-CELL LYMPHOMA, UNSPECIFIED BODY REGION: Primary | ICD-10-CM

## 2023-12-12 DIAGNOSIS — J35.8 ASYMMETRIC TONSILS: Primary | ICD-10-CM

## 2023-12-12 PROCEDURE — 3074F SYST BP LT 130 MM HG: CPT | Mod: CPTII,,, | Performed by: INTERNAL MEDICINE

## 2023-12-12 PROCEDURE — 99999 PR PBB SHADOW E&M-EST. PATIENT-LVL V: ICD-10-PCS | Mod: PBBFAC,,, | Performed by: INTERNAL MEDICINE

## 2023-12-12 PROCEDURE — 3074F PR MOST RECENT SYSTOLIC BLOOD PRESSURE < 130 MM HG: ICD-10-PCS | Mod: CPTII,,, | Performed by: INTERNAL MEDICINE

## 2023-12-12 PROCEDURE — 3078F DIAST BP <80 MM HG: CPT | Mod: CPTII,,, | Performed by: INTERNAL MEDICINE

## 2023-12-12 PROCEDURE — 1159F MED LIST DOCD IN RCRD: CPT | Mod: CPTII,,, | Performed by: INTERNAL MEDICINE

## 2023-12-12 PROCEDURE — 99215 PR OFFICE/OUTPT VISIT, EST, LEVL V, 40-54 MIN: ICD-10-PCS | Mod: S$PBB,,, | Performed by: INTERNAL MEDICINE

## 2023-12-12 PROCEDURE — 3078F PR MOST RECENT DIASTOLIC BLOOD PRESSURE < 80 MM HG: ICD-10-PCS | Mod: CPTII,,, | Performed by: INTERNAL MEDICINE

## 2023-12-12 PROCEDURE — 99215 OFFICE O/P EST HI 40 MIN: CPT | Mod: S$PBB,,, | Performed by: INTERNAL MEDICINE

## 2023-12-12 PROCEDURE — 3008F PR BODY MASS INDEX (BMI) DOCUMENTED: ICD-10-PCS | Mod: CPTII,,, | Performed by: INTERNAL MEDICINE

## 2023-12-12 PROCEDURE — 99215 OFFICE O/P EST HI 40 MIN: CPT | Mod: PBBFAC | Performed by: INTERNAL MEDICINE

## 2023-12-12 PROCEDURE — 1159F PR MEDICATION LIST DOCUMENTED IN MEDICAL RECORD: ICD-10-PCS | Mod: CPTII,,, | Performed by: INTERNAL MEDICINE

## 2023-12-12 PROCEDURE — 3008F BODY MASS INDEX DOCD: CPT | Mod: CPTII,,, | Performed by: INTERNAL MEDICINE

## 2023-12-12 PROCEDURE — 99999 PR PBB SHADOW E&M-EST. PATIENT-LVL V: CPT | Mod: PBBFAC,,, | Performed by: INTERNAL MEDICINE

## 2023-12-12 RX ORDER — FLUTICASONE PROPIONATE 50 MCG
SPRAY, SUSPENSION (ML) NASAL
COMMUNITY
Start: 2023-10-17

## 2023-12-14 DIAGNOSIS — J35.8 ASYMMETRIC TONSILS: Primary | ICD-10-CM

## 2023-12-28 ENCOUNTER — DOCUMENTATION ONLY (OUTPATIENT)
Dept: HEMATOLOGY/ONCOLOGY | Facility: CLINIC | Age: 53
End: 2023-12-28
Payer: MEDICAID

## 2024-01-08 ENCOUNTER — OFFICE VISIT (OUTPATIENT)
Dept: NEUROLOGY | Facility: CLINIC | Age: 54
End: 2024-01-08
Payer: MEDICAID

## 2024-01-08 DIAGNOSIS — G62.9 POLYNEUROPATHY: Primary | ICD-10-CM

## 2024-01-08 DIAGNOSIS — F17.200 SMOKING: ICD-10-CM

## 2024-01-08 DIAGNOSIS — M65.4 DE QUERVAIN'S TENOSYNOVITIS, BILATERAL: ICD-10-CM

## 2024-01-08 DIAGNOSIS — R25.2 MUSCLE CRAMP: ICD-10-CM

## 2024-01-08 PROCEDURE — 99214 OFFICE O/P EST MOD 30 MIN: CPT | Mod: 95,,, | Performed by: NURSE PRACTITIONER

## 2024-01-08 PROCEDURE — 1160F RVW MEDS BY RX/DR IN RCRD: CPT | Mod: CPTII,95,, | Performed by: NURSE PRACTITIONER

## 2024-01-08 PROCEDURE — 1159F MED LIST DOCD IN RCRD: CPT | Mod: CPTII,95,, | Performed by: NURSE PRACTITIONER

## 2024-01-08 RX ORDER — GABAPENTIN 100 MG/1
200 CAPSULE ORAL 2 TIMES DAILY
Qty: 90 CAPSULE | Refills: 3 | Status: SHIPPED | OUTPATIENT
Start: 2024-01-08

## 2024-01-08 RX ORDER — LANOLIN ALCOHOL/MO/W.PET/CERES
400 CREAM (GRAM) TOPICAL NIGHTLY
Qty: 30 TABLET | Refills: 3 | Status: SHIPPED | OUTPATIENT
Start: 2024-01-08

## 2024-01-08 RX ORDER — BACLOFEN 10 MG/1
10 TABLET ORAL 3 TIMES DAILY
Qty: 90 TABLET | Refills: 3 | Status: SHIPPED | OUTPATIENT
Start: 2024-01-08 | End: 2025-01-07

## 2024-01-08 NOTE — PROGRESS NOTES
[facility] Neurology Telemedicine Follow Up Visit Note    This is a real-time audio/video visit that was performed with the originating site at patient's home and the distant site, [facility]. Verbal consent to participate in interactive audio & video visit was obtained.    I discussed with the patient regarding the nature of our telehealth visits, that:    - Our sessions are not being recorded and that personal health information is protected  - Provider would evaluate the patient and recommend diagnostics and treatments based on my assessment  - [facility] will provide follow up care in person if/when the patient needs it.     Subjective:      Patient ID: Carlota Oakley is a 52 y.o. female.     Chief Complaint: Peripheral Neuropathy     HPI   This is a 53 year old right handed female with hx of HTN, Hypothyroidism, CKD III (GFR 50), Bipolar Disorder, Diffuse Large B Cell Lymphoma s/p R-CHOP (4/26/2018 - 8/9/2018) s/p Radiotherapy and chemotherapy, who was referred for hand numbness after RCHOP. Patient noted that she has numbness and burning sensation in bilateral LE and UE along muscle cramps in her hands and feet after RCHOP. States that she had numbness in bilateral hands prior to RCHOP. She also notes radial wrist pain with exertion, which leads her to drop things. 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 Does not drink. Pt was last seen on 7/6/2023. During that visit Baclofen 10mg PO QHS was initiated. LTG 150mg PO Q day, chinyere 100mg in AM, 200mg in afternoon and 300mg QHS were continued. X-ray of hand was ordered and Pt was referred to orthopedics.     Today, Pt states numbness to L hand numbness that has worsened in last 6 mths. R hand tingling continues. Cramping to L lateral/frontal area of leg has not improved, toes cramp, no improvement on Baclofen daily. States it is not new, but seems to be worsening. Denies swelling or change  in color. Will have labs drawn next week. Takes chinyere nightly, daily dose of chinyere may consist of 100mg in AM and sometimes in afternoon. Works on her feel all day. 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 Continues w/hand tingling and numbness w/occasional cramping into a claw. Difficulty w/holding objects as he hand will begin to cramp. Has never been evaluated by ortho.    Current Meds -  LTG 150mg daily  Neurontin 300mg QHS (10/5/2021 - present)  Neurontin 100-300mg in AM (3/29/2022 - present) - only takes PRN - effective     Review of Systems  Constitutional: no fever, fatigue, weakness  Eye: no vision loss, eye redness, drainage, or pain  ENMT: no sore throat, ear pain, sinus pain/congestion, nasal congestion/drainage  Respiratory: no cough, no wheezing, no shortness of breath  Cardiovascular: no chest pain, no palpitations, no edema  Gastrointestinal: no nausea, vomiting, or diarrhea. No abdominal pain  Genitourinary: no dysuria, no urinary frequency or urgency, no hematuria  Hema/Lymph: no abnormal bruising or bleeding  Endocrine: no heat or cold intolerance, no excessive thirst or excessive urination  Musculoskeletal: + muscle or joint pain, no joint swelling  Integumentary: no skin rash or abnormal lesion  Psychiatric: no depressed mood, + anxiety, no visual/auditory hallucinations, no delusions, no suicidal or homicidal ideation  Neurologic: as per HPI     Objective:   Physical Exam    General: well-developed well-nourished in no acute distress  Eye: clear conjunctiva, eyelids normal  HENT: oropharynx without erythema/exudate, oropharynx mucosal surface moist  Neck: full range of motion  Respiratory: no distress on RA  Cardiovascular: unable to assess due to nature of visit  Gastrointestinal: flat, no guarding or distension  Musculoskeletal: full range of motion of all extremities/spine without limitation or discomfort  Integumentary: no rashes or skin lesions  present    Neurologic:  Mental Status- Alert and Oriented to time, self, place, Speech is fluent, with intact reading and comprehension, long term memory intact, No Dysarthria.  CN II-XII - CN I Deferred, No ptosis b/l, EOMI w/o nystagmus, T/U midline, Shoulder shrug symmetric b/l, Hearing grossly intact b/l, Face Symmetric.  Motor - Tone and Bulk nml throughout, No involuntary movements, no satelliting with orbiting, FFM nml b/l, No pronator drift.  Sensory - unable to assess due to nature of visit  Reflexes - unable to assess due to nature of visit  Cerebellar Exam - FNF wnl b/l no intention tremor.  Romberg - mild sway  Gait - Normal, bilat arm swing intact    Tinels and Phalen's negative on 3/30/2021      Assessment:       1. Polyneuropathy G62.9     2. Tenosynovitis of thumb M65.9    3. Muscle cramp R25.2    4. De Quervain's tenosynovitis, bilateral M65.4    5. Smoking F17.200      Plan:       This is a 53 year old right handed female with hx of HTN, Hypothyroidism, CKD III (GFR 50), Bipolar Disorder, Diffuse Large B Cell Lymphoma s/p R-CHOP (4/26/2018 - 8/9/2018) s/p Radiotherapy, who was referred for chemo related polyneuropathy. Also has a right wrist De Quervain Tenosynovitis, continues w/discomfort w/brace and gabapentin. Taking folic acid at HS, tolerating better. L hand numbness has worsened. R hand tingling continues. Takes chinyere 100mg in AM, 200mg in afternoon and 300mg nightly. Continues w/toe cramping while standing at work, leg cramping at night. No improvement on Baclofen nightly PRN. X-ray hand and ortho referral pending. Smoking during telemed visit. Has had recent PET scan. Will visit with oncology soon.     [] c/w LTG 150mg daily  [] c/w Neurontin 300mg qhs  [] increase chinyere to 200mg QAM and afternoon  [] increase Baclofen 10mg  PO TID PRN muscle cramp  [] start MagOx 400mg nightly for muscle cramps  [] c/w folic acid supplement at HS  [] keep hydrated  [] referral to orthopedics  [] x-ray bilat  hands    RTC 3 months    I have explained the treatment plan, diagnosis, and prognosis to the patient, caretaker, family. All questions are answered to the best of my knowledge.    Face to Face Time 30 minute, including, counseling, education, review of test results, relevant medical records, and coordination of care.

## 2024-01-09 ENCOUNTER — OFFICE VISIT (OUTPATIENT)
Dept: SURGICAL ONCOLOGY | Facility: CLINIC | Age: 54
End: 2024-01-09
Payer: MEDICAID

## 2024-01-09 VITALS
BODY MASS INDEX: 26.87 KG/M2 | SYSTOLIC BLOOD PRESSURE: 159 MMHG | WEIGHT: 151.63 LBS | HEART RATE: 60 BPM | DIASTOLIC BLOOD PRESSURE: 75 MMHG | HEIGHT: 63 IN

## 2024-01-09 DIAGNOSIS — J35.03 CHRONIC ADENOTONSILLITIS: Primary | ICD-10-CM

## 2024-01-09 DIAGNOSIS — J35.03 CHRONIC ADENOTONSILLITIS: ICD-10-CM

## 2024-01-09 PROCEDURE — 99999 PR PBB SHADOW E&M-EST. PATIENT-LVL IV: CPT | Mod: PBBFAC,,, | Performed by: OTOLARYNGOLOGY

## 2024-01-09 PROCEDURE — 1159F MED LIST DOCD IN RCRD: CPT | Mod: CPTII,,, | Performed by: OTOLARYNGOLOGY

## 2024-01-09 PROCEDURE — 99204 OFFICE O/P NEW MOD 45 MIN: CPT | Mod: 25,S$PBB,, | Performed by: OTOLARYNGOLOGY

## 2024-01-09 PROCEDURE — 31575 DIAGNOSTIC LARYNGOSCOPY: CPT | Mod: PBBFAC | Performed by: OTOLARYNGOLOGY

## 2024-01-09 PROCEDURE — 3078F DIAST BP <80 MM HG: CPT | Mod: CPTII,,, | Performed by: OTOLARYNGOLOGY

## 2024-01-09 PROCEDURE — 3077F SYST BP >= 140 MM HG: CPT | Mod: CPTII,,, | Performed by: OTOLARYNGOLOGY

## 2024-01-09 PROCEDURE — 31575 DIAGNOSTIC LARYNGOSCOPY: CPT | Mod: S$PBB,,, | Performed by: OTOLARYNGOLOGY

## 2024-01-09 PROCEDURE — 99214 OFFICE O/P EST MOD 30 MIN: CPT | Mod: PBBFAC,25 | Performed by: OTOLARYNGOLOGY

## 2024-01-09 PROCEDURE — 3008F BODY MASS INDEX DOCD: CPT | Mod: CPTII,,, | Performed by: OTOLARYNGOLOGY

## 2024-01-09 RX ORDER — LEVOFLOXACIN 500 MG/1
500 TABLET, FILM COATED ORAL DAILY
Qty: 10 TABLET | Refills: 0 | Status: SHIPPED | OUTPATIENT
Start: 2024-01-09 | End: 2024-01-19

## 2024-01-09 NOTE — PROGRESS NOTES
[facility]  Head and Neck Surgical Oncology Clinic     Patient ID: [id number]     Chief Complaint: Left tonsillar hyper metabolism      HPI:     Carlota Oakley is a 53 y.o. female here today for evaluation of some nonspecific uptake on a recent PET-CT scan in the left nasopharynx and tonsillar region.  She does have a history of lymphoma treated back in 2018.    She has been asymptomatic as it pertains to her throat.  No odynophagia, dysphagia, referred otalgia, neck adenopathy, or any other concerning findings.    Past Medical History:   Diagnosis Date    Cancer     Headache     Memory loss     Mental disorder     Movement disorder     Neuropathy         Past Surgical History:   Procedure Laterality Date    BONE MARROW BIOPSY      BREAST BIOPSY      COLONOSCOPY N/A 01/09/2020    CYSTOSCOPY      LYMPH NODE BIOPSY          Social History     Tobacco Use    Smoking status: Every Day     Current packs/day: 1.00     Types: Cigarettes    Smokeless tobacco: Never   Substance and Sexual Activity    Alcohol use: Not Currently    Drug use: Yes     Frequency: 12.0 times per week     Types: Marijuana    Sexual activity: Not on file        Current Outpatient Medications   Medication Instructions    baclofen (LIORESAL) 10 mg, Oral, 3 times daily    EScitalopram oxalate (LEXAPRO) 20 mg, Oral, Daily    fluticasone propionate (FLONASE) 50 mcg/actuation nasal spray SHAKE LIQUID AND USE 1 SPRAY IN EACH NOSTRIL TWICE DAILY FOR 10 DAYS    folic acid (FOLVITE) 1 MG tablet TAKE 1 TABLET(1 MG) BY MOUTH EVERY DAY    gabapentin (NEURONTIN) 300 mg, Oral, Nightly    gabapentin (NEURONTIN) 200 mg, Oral, 2 times daily    lamoTRIgine (LAMICTAL) 150 mg, Oral, Daily    LIDOcaine (LIDODERM) 5 % 1 patch, Transdermal, As needed (PRN)    lovastatin (MEVACOR) 20 MG tablet TK 1 T PO QD WITH THE DANNA MEAL    magnesium oxide (MAG-OX) 400 mg, Oral, Nightly    meloxicam (MOBIC) 15 mg, Oral, Daily    traZODone  "(DESYREL) 100 mg, Oral, Nightly    ziprasidone (GEODON) 40 mg, Oral, Nightly       Review of patient's allergies indicates:   Allergen Reactions    Penicillins Hives     Other reaction(s): rash        Patient Care Team:  Henrique Eldridge NP as PCP - General (Family Medicine)     Subjective:     Review of Systems    12 point review of systems conducted, negative except as stated in the history of present illness. See HPI for details.    Objective:     Visit Vitals  BP (!) 159/75   Pulse 60   Ht 5' 3" (1.6 m)   Wt 68.8 kg (151 lb 9.6 oz)   BMI 26.85 kg/m²       ENT Physical Exam     General: AOx3, NAD  Cardiac: Well perfused  Respiratory: Breathing without stridor or distress  Right Ear: External Auditory Canal WNL,TM w/o masses/lesions/perforations  Left Ear:  External Auditory Canal WNL,TM w/o masses/lesions/perforations  Nose: No gross nasal septal deviation.  Inferior Turbinates WNL bilaterally.  No septal perforation.  No masses/lesions.  Oral Cavity: FOM Soft, no masses palpated.  Oral Tongue mobile.  Hard Palate WNL.  Oropharynx: BOT WNL.  No masses/lesions noted.  Tonsillar fossa without lesions.  Soft palate without masses.  Midline uvula.  Mild chronic tonsillitis findings with a small tonsil stone noted on the left side  Neck: No palpable lymphadenopathy at I - VI.    Face: House Brackmann I bilaterally.  Eyes: Normal extra ocular motion bilaterally.    Flexible laryngoscopy:  Flexible endoscopy was pursued.  The nasopharynx is notable for some erythema and some chronic nasal pharyngitis findings more in the left than on the right with some purulent postnasal drainage in that region.  The scope was advanced and the base of tongue larynx and hypopharynx and the backside of the tonsil region was not concerning for any ominous pathology or friability or ulceration    Diagnostic Imaging:     All pertinent head and neck imaging independently reviewed. Discussed these findings in detail with the " patient.    Assessment:       ICD-10-CM ICD-9-CM   1. Chronic adenotonsillitis  J35.03 474.02        Plan:     1. Chronic adenotonsillitis  Overview:  1. History of B-cell lymphoma with recent PET-CT scan with some nonspecific uptake in the left nasopharynx and left tonsillar region    Assessment & Plan:  Overall, I did not find any ominous findings on physical exam, but rather some findings consistent with chronic nasal pharyngitis and chronic tonsillitis, more on the left than on the right with some mild purulent drainage overlying them in the nasopharynx.    Given that, I will treat with levofloxacin given her penicillin allergy.  I will check her back in about 4 weeks for repeat examination and make sure nothing is progressing or more worrisome looking.  Overall, my suspicion for malignancy is low.    Orders:  -     Ambulatory referral/consult to ENT         No follow-ups on file. In addition to their scheduled follow up, the patient has also been instructed to follow up on as needed basis.     Future Appointments   Date Time Provider Department Center   1/16/2024  9:20 AM Radha Melissa MD [facility]   1/16/2024 10:00 AM CHAIR 01, [facility] CHEMOTHERAPY INFUSION [facility] CHEMO [place]   2/6/2024 10:00 AM [facility]   2/15/2024  8:00 AM [facility] MAMMO1 [facility] MAMMO [place]   4/8/2024 10:30 AM Mandy Soler, DINESH [facility] NEURO [place]   7/26/2024 10:30 AM Melina Mcdonnell FNP [facility]        Zurdo Springer Jr, MD

## 2024-01-09 NOTE — ASSESSMENT & PLAN NOTE
Overall, I did not find any ominous findings on physical exam, but rather some findings consistent with chronic nasal pharyngitis and chronic tonsillitis, more on the left than on the right with some mild purulent drainage overlying them in the nasopharynx.    Given that, I will treat with levofloxacin given her penicillin allergy.  I will check her back in about 4 weeks for repeat examination and make sure nothing is progressing or more worrisome looking.  Overall, my suspicion for malignancy is low.

## 2024-01-16 ENCOUNTER — OFFICE VISIT (OUTPATIENT)
Dept: HEMATOLOGY/ONCOLOGY | Facility: CLINIC | Age: 54
End: 2024-01-16
Payer: MEDICAID

## 2024-01-16 DIAGNOSIS — F17.200 SMOKING: ICD-10-CM

## 2024-01-16 DIAGNOSIS — C83.30 DIFFUSE LARGE B-CELL LYMPHOMA, UNSPECIFIED BODY REGION: ICD-10-CM

## 2024-01-16 DIAGNOSIS — C34.90 MALIGNANT NEOPLASM OF UNSPECIFIED PART OF UNSPECIFIED BRONCHUS OR LUNG: Primary | ICD-10-CM

## 2024-01-16 DIAGNOSIS — Z95.828 PORT-A-CATH IN PLACE: ICD-10-CM

## 2024-01-16 DIAGNOSIS — J35.8 ASYMMETRIC TONSILS: ICD-10-CM

## 2024-01-16 PROCEDURE — 1159F MED LIST DOCD IN RCRD: CPT | Mod: CPTII,95,, | Performed by: INTERNAL MEDICINE

## 2024-01-16 PROCEDURE — 99214 OFFICE O/P EST MOD 30 MIN: CPT | Mod: 95,,, | Performed by: INTERNAL MEDICINE

## 2024-01-16 PROCEDURE — 1160F RVW MEDS BY RX/DR IN RCRD: CPT | Mod: CPTII,95,, | Performed by: INTERNAL MEDICINE

## 2024-01-16 NOTE — PROGRESS NOTES
The patient location is: [facility]  The chief complaint leading to consultation is: followup, bad weather    Visit type: audiovisual    Face to Face time with patient: 25 minutes of total time spent on the encounter, which includes face to face time and non-face to face time preparing to see the patient (eg, review of tests), Obtaining and/or reviewing separately obtained history, Documenting clinical information in the electronic or other health record, Independently interpreting results (not separately reported) and communicating results to the patient/family/caregiver, or Care coordination (not separately reported).         Each patient to whom he or she provides medical services by telemedicine is:  (1) informed of the relationship between the physician and patient and the respective role of any other health care provider with respect to management of the patient; and (2) notified that he or she may decline to receive medical services by telemedicine and may withdraw from such care at any time.       [facility] Hematology/Oncology  PROGRESS NOTE      Subjective:         NAME: Carlota Oakley : 1970     53 y.o. female   MRN: [id number]    Referring Doc: No ref. provider found    Dr. Wisam Magaña NP      Chief Complaint:    No chief complaint on file.      Oncology/Hematology History:  1. DLBCL, stage II. Diag 3/2018  R submandibular LN biopsy 3/15/18: Diffuse large B cell lymphoma. Positive for CD45, CD20, BCL2, and BCL6; negative for CD3, CD10, CD56 and TTF-1. FISH studies: No evidence of MYC rearrangement, BCL2-IGH [translocation t(14;18)] gene rearrangement or BCL6 (3q27) breakpoint translocation.    Bone marrow biopsy 18: No evidence of malignancy.   R-CHOP x 6 cycles, 18 - 18  Consolidative radiotherapy 18-18    2. Bilateral breast masses  Biopsy R breast 2/15/18: Fibroadenoma  Biopsy L breast 18: Fibroadenoma    Clinical History:   Ms. Oakley is a 52 yo WF  "with PMH of bipolar disorder and HTN who was diagnosed with DLBCL. Pt initially noticed the "lump" under her R chin in early Feb 2018. She went to urgent care and was prescribed a course of antibiotics. The mass didn't improve and she subsequently developed some swallowing difficulties. On 3/10/18 she was seen at [facility] and CT neck done there demonstrated a R submandibular mass measuring 4.6 x 2.6 x 2.5 cm as well as a mass invading into the R maxillary bone. It was around this time that she had a routine follow up with Dr. Wisam Eldridge (he was performing workup for bilateral breast masses) and he ordered a biopsy of the submandibular mass. Biopsy done on 3/15/18 confirmed diagnosis of diffuse large B cell lymphoma. IHC and FISH results noted above.     Interval History:  Ms. Oakley presents today for follow-up with PET/CT results and discussion of her labs. She reports bilateral hand and leg pain. She has swelling in her joints in both hands. She has not had a follow up with ortho in a long time, she will be calling to make an appointment. She reports bilateral lower and upper extremity neuropathy. She is followed by neurology. She has had 4-5 falls within the last 6 months, she said most of them are related to her feeling dizzy. She does report night sweats. Her weight goes ups and down and this has been happening since she has the chemotherapy.  She denies SOB, chest pain, fevers, adenopathy. She take geodon and balofen which make her feel fatigue.    01/16/2024: Patient is evaluated via telemedicine due to weather conditions.   Patient was seen by dr Springer 1/9/2024 . She was referred due to abnormal PET: Asymmetric left tonsillar hypermetabolism with mild asymmetry on the CT images, nonspecific.  Correlation with direct visualization can be considered. Otherwise, no tracer avid metastatic disease identified.    Overall, Dr Springer "did not find any ominous findings on physical exam, but rather some findings " "consistent with chronic nasal pharyngitis and chronic tonsillitis, more on the left than on the right with some mild purulent drainage overlying them in the nasopharynx.   Given that, I will treat with levofloxacin given her penicillin allergy.  I will check her back in about 4 weeks for repeat examination and make sure nothing is progressing or more worrisome looking.  Overall, my suspicion for malignancy is low."    Today She cont with night sweats x > 6months.  PET-CT was done to evaluate the night sweats and was essentially negative except with the hyperactivity in the tonsillar area.  See above for details.  She was seen by ENT and finding as above.  Her gabapentin was just increased by her her neurologist, she was seen by SANDRA Gunderson, to due worsening neuropathy.     ROS:   Review of Systems   Constitutional:  Negative for appetite change, chills, fatigue, fever and unexpected weight change.   HENT:  Negative for facial swelling, mouth sores, nosebleeds, sinus pressure/congestion and sore throat.    Eyes:  Negative for photophobia, pain and visual disturbance.   Respiratory:  Negative for cough, chest tightness, shortness of breath and wheezing.    Cardiovascular:  Negative for chest pain, palpitations and leg swelling.   Gastrointestinal:  Negative for abdominal pain, blood in stool, change in bowel habit, constipation, diarrhea, nausea and vomiting.   Endocrine: Negative.    Genitourinary:  Negative for dysuria, frequency, hematuria, hot flashes, pelvic pain, urgency and vaginal pain.   Musculoskeletal:  Positive for back pain (Chonic). Negative for arthralgias, gait problem, joint swelling and myalgias.   Integumentary:  Negative for pallor, rash, wound, breast mass, breast discharge and breast tenderness.        Left axillary pain    Allergic/Immunologic: Negative.  Negative for immunocompromised state.   Neurological:  Negative for dizziness, vertigo, syncope, weakness, numbness and headaches. "   Hematological:  Negative for adenopathy. Does not bruise/bleed easily.   Psychiatric/Behavioral:  Negative for behavioral problems and dysphoric mood. The patient is nervous/anxious.    All other systems reviewed and are negative.  Breast: Negative for mass and tenderness       Allergies:  Review of patient's allergies indicates:   Allergen Reactions    Penicillins Hives     Other reaction(s): rash       Medications:    Current Outpatient Medications:     baclofen (LIORESAL) 10 MG tablet, Take 1 tablet (10 mg total) by mouth 3 (three) times daily., Disp: 90 tablet, Rfl: 3    EScitalopram oxalate (LEXAPRO) 20 MG tablet, Take 20 mg by mouth once daily., Disp: , Rfl:     fluticasone propionate (FLONASE) 50 mcg/actuation nasal spray, SHAKE LIQUID AND USE 1 SPRAY IN EACH NOSTRIL TWICE DAILY FOR 10 DAYS, Disp: , Rfl:     folic acid (FOLVITE) 1 MG tablet, TAKE 1 TABLET(1 MG) BY MOUTH EVERY DAY, Disp: 90 tablet, Rfl: 3    gabapentin (NEURONTIN) 100 MG capsule, Take 2 capsules (200 mg total) by mouth 2 (two) times daily., Disp: 90 capsule, Rfl: 3    gabapentin (NEURONTIN) 300 MG capsule, Take 1 capsule (300 mg total) by mouth every evening., Disp: 90 capsule, Rfl: 3    lamoTRIgine (LAMICTAL) 150 MG Tab, Take 1 tablet (150 mg total) by mouth once daily., Disp: 90 tablet, Rfl: 3    levoFLOXacin (LEVAQUIN) 500 MG tablet, Take 1 tablet (500 mg total) by mouth once daily. for 10 days, Disp: 10 tablet, Rfl: 0    LIDOcaine (LIDODERM) 5 %, Place 1 patch onto the skin as needed., Disp: , Rfl:     lovastatin (MEVACOR) 20 MG tablet, TK 1 T PO QD WITH THE DANNA MEAL, Disp: , Rfl: 3    magnesium oxide (MAG-OX) 400 mg (241.3 mg magnesium) tablet, Take 1 tablet (400 mg total) by mouth every evening., Disp: 30 tablet, Rfl: 3    meloxicam (MOBIC) 15 MG tablet, Take 15 mg by mouth once daily., Disp: , Rfl:     traZODone (DESYREL) 50 MG tablet, Take 100 mg by mouth every evening., Disp: , Rfl:     ziprasidone (GEODON) 40 MG Cap, Take 40 mg by  mouth every evening., Disp: , Rfl:     Current Facility-Administered Medications:     heparin, porcine (PF) 100 unit/mL injection flush 500 Units, 500 Units, Intravenous, PRN, OrderMandy MD, 500 Units at 10/13/22 1326    heparin, porcine (PF) 100 unit/mL injection flush 500 Units, 500 Units, Intravenous, PRN, OrderMandy MD    heparin, porcine (PF) 100 unit/mL injection flush 500 Units, 500 Units, Intravenous, PRN, OrderMandy MD, 500 Units at 01/12/23 1420    heparin, porcine (PF) 100 unit/mL injection flush 500 Units, 500 Units, Intravenous, PRN, Mandy Laird MD, 500 Units at 04/12/23 1400    sodium chloride 0.9% flush 10 mL, 10 mL, Intravenous, PRN, OrderMandy MD, 10 mL at 10/13/22 1326    sodium chloride 0.9% flush 10 mL, 10 mL, Intravenous, PRN, Mandy Laird MD, 10 mL at 01/12/23 1420    sodium chloride 0.9% flush 10 mL, 10 mL, Intravenous, PRN, OrderMandy MD    sodium chloride 0.9% flush 10 mL, 10 mL, Intravenous, PRN, OrderMandy MD, 10 mL at 04/12/23 1400    PMHx/PSHx Updates:   Past Medical History:   Diagnosis Date    Cancer     Headache     Memory loss     Mental disorder     Movement disorder     Neuropathy      Past Surgical History:   Procedure Laterality Date    BONE MARROW BIOPSY      BREAST BIOPSY      COLONOSCOPY N/A 01/09/2020    CYSTOSCOPY      LYMPH NODE BIOPSY         Family History:  Family History   Problem Relation Age of Onset    Hypertension Mother     Diabetes Mother     Hypertension Father     Stroke Maternal Grandfather        Social History:  Social History     Socioeconomic History    Marital status: Single   Tobacco Use    Smoking status: Every Day     Current packs/day: 1.00     Types: Cigarettes    Smokeless tobacco: Never   Substance and Sexual Activity    Alcohol use: Not Currently    Drug use: Yes     Frequency: 12.0 times per week     Types: Marijuana       Vitals:  There were no vitals taken for this visit.    Physical  Examination:   Physical Exam  Constitutional:       General: She is awake.      Appearance: Normal appearance.   HENT:      Head: Normocephalic and atraumatic.   Eyes:      General: No scleral icterus.     Extraocular Movements: Extraocular movements intact.      Conjunctiva/sclera: Conjunctivae normal.   Musculoskeletal:      Cervical back: Neck supple.   Neurological:      Mental Status: She is alert.   Psychiatric:         Attention and Perception: Attention normal.         Judgment: Judgment normal.             Labs:   No visits with results within 1 Week(s) from this visit.   Latest known visit with results is:   Lab Visit on 12/12/2023   Component Date Value Ref Range Status    Sodium Level 12/12/2023 143  136 - 145 mmol/L Final    Potassium Level 12/12/2023 5.1  3.5 - 5.1 mmol/L Final    Chloride 12/12/2023 105  98 - 107 mmol/L Final    Carbon Dioxide 12/12/2023 31 (H)  22 - 29 mmol/L Final    Glucose Level 12/12/2023 84  74 - 100 mg/dL Final    Blood Urea Nitrogen 12/12/2023 24.0 (H)  9.8 - 20.1 mg/dL Final    Creatinine 12/12/2023 1.32 (H)  0.55 - 1.02 mg/dL Final    Calcium Level Total 12/12/2023 9.4  8.4 - 10.2 mg/dL Final    Protein Total 12/12/2023 7.2  6.4 - 8.3 gm/dL Final    Albumin Level 12/12/2023 3.9  3.5 - 5.0 g/dL Final    Globulin 12/12/2023 3.3  2.4 - 3.5 gm/dL Final    Albumin/Globulin Ratio 12/12/2023 1.2  1.1 - 2.0 ratio Final    Bilirubin Total 12/12/2023 0.4  <=1.5 mg/dL Final    Alkaline Phosphatase 12/12/2023 76  40 - 150 unit/L Final    Alanine Aminotransferase 12/12/2023 11  0 - 55 unit/L Final    Aspartate Aminotransferase 12/12/2023 17  5 - 34 unit/L Final    eGFR 12/12/2023 48  mls/min/1.73/m2 Final    Lactate Dehydrogenase 12/12/2023 165  125 - 220 U/L Final    Iron Binding Capacity Unsaturated 12/12/2023 184  70 - 310 ug/dL Final    Iron Level 12/12/2023 54  50 - 170 ug/dL Final    Iron Binding Capacity Total 12/12/2023 238 (L)  250 - 450 ug/dL Final    Iron Saturation 12/12/2023  23  20 - 50 % Final    Folate Level 12/12/2023 18.4  7.0 - 31.4 ng/mL Final    Ferritin Level 12/12/2023 267.09 (H)  4.63 - 204.00 ng/mL Final    Vitamin B12 Level 12/12/2023 565  213 - 816 pg/mL Final    WBC 12/12/2023 7.00  4.50 - 11.50 x10(3)/mcL Final    RBC 12/12/2023 4.20  4.20 - 5.40 x10(6)/mcL Final    Hgb 12/12/2023 13.1  12.0 - 16.0 g/dL Final    Hct 12/12/2023 41.0  37.0 - 47.0 % Final    MCV 12/12/2023 97.6 (H)  80.0 - 94.0 fL Final    MCH 12/12/2023 31.2 (H)  27.0 - 31.0 pg Final    MCHC 12/12/2023 32.0 (L)  33.0 - 36.0 g/dL Final    RDW 12/12/2023 12.1  11.5 - 17.0 % Final    Platelet 12/12/2023 194  130 - 400 x10(3)/mcL Final    MPV 12/12/2023 9.9  7.4 - 10.4 fL Final    Neut % 12/12/2023 65.6  % Final    Lymph % 12/12/2023 23.1  % Final    Mono % 12/12/2023 7.4  % Final    Eos % 12/12/2023 3.0  % Final    Basophil % 12/12/2023 0.6  % Final    Lymph # 12/12/2023 1.62  0.6 - 4.6 x10(3)/mcL Final    Neut # 12/12/2023 4.59  2.1 - 9.2 x10(3)/mcL Final    Mono # 12/12/2023 0.52  0.1 - 1.3 x10(3)/mcL Final    Eos # 12/12/2023 0.21  0 - 0.9 x10(3)/mcL Final    Baso # 12/12/2023 0.04  <=0.2 x10(3)/mcL Final    IG# 12/12/2023 0.02  0 - 0.04 x10(3)/mcL Final    IG% 12/12/2023 0.3  % Final       Radiology/Diagnostic Studies:  CT neck 3/10/18: Indistinct soft tissue mass identified involving the right maxilla at the floor of the maxillary sinus and alveolar ridge. Matted adenopathy is identified in the R submandibular space measuring 4.6 x 2.6 x 2.5 cm.   2D echocardiogram 4/9/18: LVEF 62%   PET 4/19/18: Hypermetabolic erosive mass along the right maxillary floor/alveolar ridge. Right submandibular hypermetabolic lymph node conglomeration. Additional mildly prominent lymph nodes at left level IIa and in the low right paratracheal space with mild hypermetabolism. Nonspecific mild hypermetabolism along the posterior and posterolateral left sixth rib without correlating CT abnormality. Mild radiotracer activity  associated with a lobulated soft tissue nodule in the retroareolar left breast, likely related to the recently biopsied lesion.   PET 6/20/18: Tumor regression. No new findings.   PET 8/28/18: Negative.   MRI brain 9/14/18: Mild generalized cerebral and cerebellar atrophy, normal for patient's age. Mucoperiosteal disease left maxillary sinus and ethmoid sinuses.   CT neck 11/1/18: Small lipoma at BB marker on the palpable right neck measuring up to 2.5cm.   PET 3/19/19: No evidence of hypermetabolic nika disease or suggestion of other FDG-avid or aggressive process through the head/neck, chest, abdomen, or pelvis.   PET 12/6/23: Asymmetric left tonsillar hypermetabolism with mild asymmetry on the CT images, nonspecific.  Correlation with direct visualization can be considered.  Otherwise, no tracer avid metastatic disease identified      I have reviewed all available lab results and radiology reports.    Assessment/Plan:   DLBCL (diffuse large B cell lymphoma) C83.30 MAITE x 5 years from end of chemotherapy  Pt with DLBCL, patient see by Dr. Wallace  last week and noted the patient continues in remission. Will continue to follow. RTC 12 months or prn.  Labs are stable today and patient is asymptomatic. Continue routine follow-up.   Macrocytic anemia  Hx of macrocytosis and folate deficiency with normal vit B12.   Folate today 34.1 continue to take every other day  Neuropathy  Managed by neurology. Continue to follow with neurology   Hx Hematuria  Continue to follow with urology  Iron stores are replete.   Left Axillary Pain   Mammogram in feb 2023 was negative. F/u due Feb 2024  6. One polyp on colonosopy 1-2 years ago (she is not sure) rec repeat in 5 year    Bone and joint pain (polyarthritis) :   RF and DANIEL negative, ESR  and CRP minmally elevated. She has been following with neurology and will continue gabapentin 300mg nightly, and  daily dose of chinyere which consists of 100mg in AM and sometimes in afternoon.  She was started on  Baclofen 10mg 1 PO Qday PRN muscle cramp, and referred to ortho which she will make an appointment.     Informed patient to call if she has s/s of lymphoma recurrence including adenopathy, fevers, night sweats, ongoing weightloss etc.       Discussion:   PET CT with tonsillar hypermetabolism. She was  evaluated by Dr Springer, ENT and will see her again in a month.    Keep appt with Dr. Springer ENT   RTC 3 months with MD after ENT  PET/CT to eval stability of findings  Labs prior to visit  I have explained all of the above in detail and the patient understands all of the current recommendation(s). I have answered all of their questions to the best of my ability and to their complete satisfaction.   The patient is to continue with the current management plan.      Radha Melissa MD  Hematology/Oncology  [facility]

## 2024-01-19 ENCOUNTER — HOSPITAL ENCOUNTER (OUTPATIENT)
Dept: RADIOLOGY | Facility: HOSPITAL | Age: 54
Discharge: HOME | End: 2024-01-19
Attending: NURSE PRACTITIONER
Payer: MEDICAID

## 2024-01-19 ENCOUNTER — INFUSION (OUTPATIENT)
Dept: INFUSION THERAPY | Facility: HOSPITAL | Age: 54
End: 2024-01-19
Attending: NURSE PRACTITIONER
Payer: MEDICAID

## 2024-01-19 VITALS
OXYGEN SATURATION: 97 % | TEMPERATURE: 98 F | RESPIRATION RATE: 18 BRPM | HEART RATE: 69 BPM | DIASTOLIC BLOOD PRESSURE: 74 MMHG | HEIGHT: 63 IN | WEIGHT: 155 LBS | SYSTOLIC BLOOD PRESSURE: 126 MMHG | BODY MASS INDEX: 27.46 KG/M2

## 2024-01-19 DIAGNOSIS — Z95.828 PORT-A-CATH IN PLACE: Primary | ICD-10-CM

## 2024-01-19 DIAGNOSIS — M65.4 DE QUERVAIN'S TENOSYNOVITIS, BILATERAL: ICD-10-CM

## 2024-01-19 PROCEDURE — 73120 X-RAY EXAM OF HAND: CPT | Mod: TC

## 2024-01-19 PROCEDURE — 63600175 PHARM REV CODE 636 W HCPCS

## 2024-01-19 PROCEDURE — 96523 IRRIG DRUG DELIVERY DEVICE: CPT

## 2024-01-19 RX ORDER — HEPARIN 100 UNIT/ML
500 SYRINGE INTRAVENOUS
Status: CANCELLED | OUTPATIENT
Start: 2024-01-19

## 2024-01-19 RX ORDER — HEPARIN 100 UNIT/ML
500 SYRINGE INTRAVENOUS
Status: DISCONTINUED | OUTPATIENT
Start: 2024-01-19 | End: 2024-01-19 | Stop reason: HOSPADM

## 2024-01-19 RX ORDER — SODIUM CHLORIDE 0.9 % (FLUSH) 0.9 %
10 SYRINGE (ML) INJECTION
Status: CANCELLED | OUTPATIENT
Start: 2024-01-19

## 2024-01-19 RX ORDER — SODIUM CHLORIDE 0.9 % (FLUSH) 0.9 %
10 SYRINGE (ML) INJECTION
Status: DISCONTINUED | OUTPATIENT
Start: 2024-01-19 | End: 2024-01-19 | Stop reason: HOSPADM

## 2024-01-19 RX ADMIN — HEPARIN 500 UNITS: 100 SYRINGE at 08:01

## 2024-01-29 ENCOUNTER — TELEPHONE (OUTPATIENT)
Dept: NEUROLOGY | Facility: CLINIC | Age: 54
End: 2024-01-29
Payer: MEDICAID

## 2024-02-06 ENCOUNTER — OFFICE VISIT (OUTPATIENT)
Dept: SURGICAL ONCOLOGY | Facility: CLINIC | Age: 54
End: 2024-02-06
Payer: MEDICAID

## 2024-02-06 VITALS
SYSTOLIC BLOOD PRESSURE: 104 MMHG | HEIGHT: 63 IN | BODY MASS INDEX: 27.75 KG/M2 | DIASTOLIC BLOOD PRESSURE: 70 MMHG | WEIGHT: 156.63 LBS

## 2024-02-06 DIAGNOSIS — J35.03 CHRONIC ADENOTONSILLITIS: Primary | ICD-10-CM

## 2024-02-06 PROCEDURE — 99999 PR PBB SHADOW E&M-EST. PATIENT-LVL III: CPT | Mod: PBBFAC,,,

## 2024-02-06 PROCEDURE — 99213 OFFICE O/P EST LOW 20 MIN: CPT | Mod: PBBFAC

## 2024-02-06 PROCEDURE — 3078F DIAST BP <80 MM HG: CPT | Mod: CPTII,,,

## 2024-02-06 PROCEDURE — 99214 OFFICE O/P EST MOD 30 MIN: CPT | Mod: S$PBB,,,

## 2024-02-06 PROCEDURE — 1159F MED LIST DOCD IN RCRD: CPT | Mod: CPTII,,,

## 2024-02-06 PROCEDURE — 3008F BODY MASS INDEX DOCD: CPT | Mod: CPTII,,,

## 2024-02-06 PROCEDURE — 3074F SYST BP LT 130 MM HG: CPT | Mod: CPTII,,,

## 2024-02-06 RX ORDER — CLINDAMYCIN HYDROCHLORIDE 300 MG/1
300 CAPSULE ORAL EVERY 8 HOURS
Qty: 30 CAPSULE | Refills: 0 | Status: SHIPPED | OUTPATIENT
Start: 2024-02-06 | End: 2024-02-16

## 2024-02-06 NOTE — PROGRESS NOTES
[facility]     Patient ID: [id number]     Chief Complaint: Follow-up      HPI:     Carlota Oakley is a 53 y.o. female here today for  follow-up.  She had uptake on a PET scan in the left nasopharynx and tonsillar region.  She has remained asymptomatic.  She is here today to follow-up from the findings of chronic nasal pharyngitis and chronic tonsillitis that were seen on flex laryngoscopy last visit.  She is doing well today and completed the course of Levaquin.    Past Medical History:   Diagnosis Date    Cancer     Headache     Memory loss     Mental disorder     Movement disorder     Neuropathy         Past Surgical History:   Procedure Laterality Date    BONE MARROW BIOPSY      BREAST BIOPSY      COLONOSCOPY N/A 01/09/2020    CYSTOSCOPY      LYMPH NODE BIOPSY          Social History     Tobacco Use    Smoking status: Every Day     Current packs/day: 1.00     Types: Cigarettes    Smokeless tobacco: Never   Substance and Sexual Activity    Alcohol use: Not Currently    Drug use: Yes     Frequency: 12.0 times per week     Types: Marijuana    Sexual activity: Not on file        Current Outpatient Medications   Medication Instructions    baclofen (LIORESAL) 10 mg, Oral, 3 times daily    clindamycin (CLEOCIN) 300 mg, Oral, Every 8 hours    EScitalopram oxalate (LEXAPRO) 20 mg, Oral, Daily    fluticasone propionate (FLONASE) 50 mcg/actuation nasal spray SHAKE LIQUID AND USE 1 SPRAY IN EACH NOSTRIL TWICE DAILY FOR 10 DAYS    folic acid (FOLVITE) 1 MG tablet TAKE 1 TABLET(1 MG) BY MOUTH EVERY DAY    gabapentin (NEURONTIN) 300 mg, Oral, Nightly    gabapentin (NEURONTIN) 200 mg, Oral, 2 times daily    lamoTRIgine (LAMICTAL) 150 mg, Oral, Daily    LIDOcaine (LIDODERM) 5 % 1 patch, Transdermal, As needed (PRN)    lovastatin (MEVACOR) 20 MG tablet TK 1 T PO QD WITH THE DANNA MEAL    magnesium oxide (MAG-OX) 400 mg, Oral, Nightly     "meloxicam (MOBIC) 15 mg, Oral, Daily    traZODone (DESYREL) 100 mg, Oral, Nightly    ziprasidone (GEODON) 40 mg, Oral, Nightly       Review of patient's allergies indicates:   Allergen Reactions    Penicillins Hives     Other reaction(s): rash        Patient Care Team:  Henrique Eldridge NP as PCP - General (Family Medicine)     Subjective:     Review of Systems    12 point review of systems conducted, negative except as stated in the history of present illness. See HPI for details.    Objective:     Visit Vitals  /70   Pulse (!) (P) 58   Ht 5' 3" (1.6 m)   Wt 71 kg (156 lb 9.6 oz)   BMI 27.74 kg/m²       ENT Physical Exam     General: AOx3, NAD  Cardiac: Well perfused  Respiratory: Breathing without stridor or distress  Right Ear: External Auditory Canal WNL,TM w/o masses/lesions/perforations  Left Ear:  External Auditory Canal WNL,TM w/o masses/lesions/perforations  Nose: No gross nasal septal deviation.  Inferior Turbinates WNL bilaterally.  No septal perforation.  No masses/lesions.  Oral Cavity: FOM Soft, no masses palpated.  Oral Tongue mobile.  Hard Palate WNL.  Oropharynx: BOT WNL.  No masses/lesions noted.  Tonsillar fossa without lesions.  Soft palate without masses.  Midline uvula.  Neck: No palpable lymphadenopathy at I - VI.    Face: House Brackmann I bilaterally.  Eyes: Normal extra ocular motion bilaterally.    Flexible laryngoscopy: Flex laryngoscopy was pursued.  The nasopharynx did have some remaining mucoid postnasal drainage.  There was also drainage overlying the tonsils bilaterally.  There were no concerning masses, or lesions.    Diagnostic Imaging:     All pertinent head and neck imaging independently reviewed. Discussed these findings in detail with the patient.    Assessment:       ICD-10-CM ICD-9-CM   1. Chronic adenotonsillitis  J35.03 474.02        Plan:     1. Chronic adenotonsillitis  Overview:  1. History of B-cell lymphoma with recent PET-CT scan with some nonspecific uptake " in the left nasopharynx and left tonsillar region    Assessment & Plan:  -  The findings of chronic nasal pharyngitis and chronic tonsillitis were still present on exam today.  She had some remaining purulent drainage postnasally.  She previously finished 10 days of Levaquin.  We will treat with antibiotics again given the continued drainage.    -She does have an upcoming PET scan.  We will see her back in 6 weeks again to ensure improvement.      Other orders  -     clindamycin (CLEOCIN) 300 MG capsule; Take 1 capsule (300 mg total) by mouth every 8 (eight) hours. for 10 days  Dispense: 30 capsule; Refill: 0         No follow-ups on file. In addition to their scheduled follow up, the patient has also been instructed to follow up on as needed basis.     Future Appointments   Date Time Provider Department Center   2/15/2024  8:00 AM [facility] MAMMO1 [facility] MAMMO [facility]   3/19/2024  9:30 AM [facility]   4/8/2024 10:30 AM Mandy Soler, ANP [facility] NEURO [place]    4/15/2024  2:00 PM CHAIR 02, [facility] CHEMOTHERAPY INFUSION [facility] CHEMO [facility]   4/16/2024 11:40 AM Radha Melissa MD [facility]   7/26/2024 10:30 AM Melina Mcdonnell, STEPHANIEP [facility]        SANDRA Toure

## 2024-02-06 NOTE — ASSESSMENT & PLAN NOTE
-  The findings of chronic nasal pharyngitis and chronic tonsillitis were still present on exam today.  She had some remaining purulent drainage postnasally.  She previously finished 10 days of Levaquin.  We will treat with antibiotics again given the continued drainage.    -She does have an upcoming PET scan.  We will see her back in 6 weeks again to ensure improvement.

## 2024-02-15 ENCOUNTER — HOSPITAL ENCOUNTER (OUTPATIENT)
Dept: RADIOLOGY | Facility: HOSPITAL | Age: 54
Discharge: HOME | End: 2024-02-15
Attending: NURSE PRACTITIONER
Payer: MEDICAID

## 2024-02-15 DIAGNOSIS — Z12.31 OTHER SCREENING MAMMOGRAM: ICD-10-CM

## 2024-02-15 PROCEDURE — 77067 SCR MAMMO BI INCL CAD: CPT | Mod: TC

## 2024-02-15 PROCEDURE — 77063 BREAST TOMOSYNTHESIS BI: CPT | Mod: 26,,, | Performed by: STUDENT IN AN ORGANIZED HEALTH CARE EDUCATION/TRAINING PROGRAM

## 2024-02-15 PROCEDURE — 77067 SCR MAMMO BI INCL CAD: CPT | Mod: 26,,, | Performed by: STUDENT IN AN ORGANIZED HEALTH CARE EDUCATION/TRAINING PROGRAM

## 2024-03-06 RX ORDER — GABAPENTIN 300 MG/1
300 CAPSULE ORAL NIGHTLY
Qty: 90 CAPSULE | Refills: 0 | Status: SHIPPED | OUTPATIENT
Start: 2024-03-06 | End: 2024-04-08 | Stop reason: SDUPTHER

## 2024-03-19 ENCOUNTER — OFFICE VISIT (OUTPATIENT)
Dept: SURGICAL ONCOLOGY | Facility: CLINIC | Age: 54
End: 2024-03-19
Payer: MEDICAID

## 2024-03-19 VITALS
WEIGHT: 154.63 LBS | DIASTOLIC BLOOD PRESSURE: 71 MMHG | SYSTOLIC BLOOD PRESSURE: 108 MMHG | BODY MASS INDEX: 27.4 KG/M2 | HEIGHT: 63 IN

## 2024-03-19 DIAGNOSIS — J35.03 CHRONIC ADENOTONSILLITIS: Primary | ICD-10-CM

## 2024-03-19 PROCEDURE — 99213 OFFICE O/P EST LOW 20 MIN: CPT | Mod: PBBFAC | Performed by: OTOLARYNGOLOGY

## 2024-03-19 PROCEDURE — 3074F SYST BP LT 130 MM HG: CPT | Mod: CPTII,,, | Performed by: OTOLARYNGOLOGY

## 2024-03-19 PROCEDURE — 31575 DIAGNOSTIC LARYNGOSCOPY: CPT | Mod: PBBFAC | Performed by: OTOLARYNGOLOGY

## 2024-03-19 PROCEDURE — 3078F DIAST BP <80 MM HG: CPT | Mod: CPTII,,, | Performed by: OTOLARYNGOLOGY

## 2024-03-19 PROCEDURE — 99999 PR PBB SHADOW E&M-EST. PATIENT-LVL III: CPT | Mod: PBBFAC,,, | Performed by: OTOLARYNGOLOGY

## 2024-03-19 PROCEDURE — 31575 DIAGNOSTIC LARYNGOSCOPY: CPT | Mod: S$PBB,,, | Performed by: OTOLARYNGOLOGY

## 2024-03-19 PROCEDURE — 99214 OFFICE O/P EST MOD 30 MIN: CPT | Mod: 25,S$PBB,, | Performed by: OTOLARYNGOLOGY

## 2024-03-19 PROCEDURE — 3008F BODY MASS INDEX DOCD: CPT | Mod: CPTII,,, | Performed by: OTOLARYNGOLOGY

## 2024-03-19 PROCEDURE — 1159F MED LIST DOCD IN RCRD: CPT | Mod: CPTII,,, | Performed by: OTOLARYNGOLOGY

## 2024-03-19 NOTE — PROGRESS NOTES
[facility]     Patient ID: [id number]     Chief Complaint: Follow-up (6 wk f/u for Left tonsillar hyper metabolism/)      HPI:     Carlota Oakley is a 53 y.o. female here today for ongoing surveillance.  Recall that she had abnormal uptake in the left tonsillar region on a PET scan.  Since that time she has been asymptomatic there has been no physical exam findings to correlate this.  We continue to follow her.  No new complaints in the interim.    Past Medical History:   Diagnosis Date    Cancer     Headache     Memory loss     Mental disorder     Movement disorder     Neuropathy         Past Surgical History:   Procedure Laterality Date    BONE MARROW BIOPSY      BREAST BIOPSY      COLONOSCOPY N/A 01/09/2020    CYSTOSCOPY      LYMPH NODE BIOPSY          Social History     Tobacco Use    Smoking status: Every Day     Current packs/day: 1.00     Types: Cigarettes    Smokeless tobacco: Never   Substance and Sexual Activity    Alcohol use: Not Currently    Drug use: Yes     Frequency: 12.0 times per week     Types: Marijuana    Sexual activity: Not on file        Current Outpatient Medications   Medication Instructions    baclofen (LIORESAL) 10 mg, Oral, 3 times daily    EScitalopram oxalate (LEXAPRO) 20 mg, Oral, Daily    fluticasone propionate (FLONASE) 50 mcg/actuation nasal spray SHAKE LIQUID AND USE 1 SPRAY IN EACH NOSTRIL TWICE DAILY FOR 10 DAYS    folic acid (FOLVITE) 1 MG tablet TAKE 1 TABLET(1 MG) BY MOUTH EVERY DAY    gabapentin (NEURONTIN) 200 mg, Oral, 2 times daily    gabapentin (NEURONTIN) 300 mg, Oral, Nightly    lamoTRIgine (LAMICTAL) 150 mg, Oral, Daily    LIDOcaine (LIDODERM) 5 % 1 patch, Transdermal, As needed (PRN)    lovastatin (MEVACOR) 20 MG tablet TK 1 T PO QD WITH THE DANNA MEAL    magnesium oxide (MAG-OX) 400 mg, Oral, Nightly    meloxicam (MOBIC) 15 mg, Oral, Daily    traZODone (DESYREL) 100 mg, Oral,  "Nightly    ziprasidone (GEODON) 40 mg, Oral, Nightly       Review of patient's allergies indicates:   Allergen Reactions    Penicillins Hives     Other reaction(s): rash        Patient Care Team:  Henrique Eldridge NP as PCP - General (Family Medicine)     Subjective:     Review of Systems    12 point review of systems conducted, negative except as stated in the history of present illness. See HPI for details.    Objective:     Visit Vitals  /71   Pulse (!) (P) 59   Ht 5' 3" (1.6 m)   Wt 70.1 kg (154 lb 9.6 oz)   BMI 27.39 kg/m²       ENT Physical Exam     General: AOx3, NAD  Cardiac: Well perfused  Respiratory: Breathing without stridor or distress  Right Ear: External Auditory Canal WNL,TM w/o masses/lesions/perforations  Left Ear:  External Auditory Canal WNL,TM w/o masses/lesions/perforations  Nose: No gross nasal septal deviation.  Inferior Turbinates WNL bilaterally.  No septal perforation.  No masses/lesions.  Oral Cavity: FOM Soft, no masses palpated.  Oral Tongue mobile.  Hard Palate WNL.  Oropharynx: BOT WNL.  No masses/lesions noted.  Tonsillar fossa without lesions.  Soft palate without masses.  Midline uvula.  Neck: No palpable lymphadenopathy at I - VI.    Face: House Brackmann I bilaterally.  Eyes: Normal extra ocular motion bilaterally.    Flexible laryngoscopy:  Flexible laryngoscopy was pursued through the left side of the nose.  Nasopharynx oropharynx larynx base of tongue are all unremarkable.  No sign of mass or lesion or friability.    Diagnostic Imaging:     All pertinent head and neck imaging independently reviewed. Discussed these findings in detail with the patient.    Assessment:       ICD-10-CM ICD-9-CM   1. Chronic adenotonsillitis  J35.03 474.02        Plan:     1. Chronic adenotonsillitis  Overview:  1. History of B-cell lymphoma with recent PET-CT scan with some nonspecific uptake in the left nasopharynx and left tonsillar region    Assessment & Plan:  On flexible endoscopy " today, there was no evidence of any erythema, mass, or lesion in the nasopharynx or oropharynx.    We will see her 1 more time in about 6 weeks or so, after her upcoming scan just for completeness sake.           No follow-ups on file. In addition to their scheduled follow up, the patient has also been instructed to follow up on as needed basis.     Future Appointments   Date Time Provider Department Center   4/8/2024 10:30 AM Mandy Soler ANP [facility] NEURO [place]    4/9/2024  9:15 AM [facility]   4/15/2024  2:00 PM CHAIR 02, [facility] CHEMOTHERAPY INFUSION [facility] CHEMO [facility]   4/16/2024 11:40 AM Radha Melissa MD [facility]   5/21/2024  9:00 AM [facility]   7/26/2024 10:30 AM Melina Mcdonnell FNP [facility]        Zurdo Springer Jr, MD

## 2024-03-19 NOTE — ASSESSMENT & PLAN NOTE
On flexible endoscopy today, there was no evidence of any erythema, mass, or lesion in the nasopharynx or oropharynx.    We will see her 1 more time in about 6 weeks or so, after her upcoming scan just for completeness sake.

## 2024-03-27 DIAGNOSIS — N28.1 ACQUIRED CYST OF KIDNEY: Primary | ICD-10-CM

## 2024-04-08 ENCOUNTER — TELEPHONE (OUTPATIENT)
Dept: NEUROLOGY | Facility: CLINIC | Age: 54
End: 2024-04-08
Payer: MEDICAID

## 2024-04-08 ENCOUNTER — OFFICE VISIT (OUTPATIENT)
Dept: NEUROLOGY | Facility: CLINIC | Age: 54
End: 2024-04-08
Payer: MEDICAID

## 2024-04-08 DIAGNOSIS — G62.9 POLYNEUROPATHY: Primary | ICD-10-CM

## 2024-04-08 DIAGNOSIS — F17.200 SMOKING: ICD-10-CM

## 2024-04-08 DIAGNOSIS — R25.2 MUSCLE CRAMP: ICD-10-CM

## 2024-04-08 PROCEDURE — G2211 COMPLEX E/M VISIT ADD ON: HCPCS | Mod: 95,,, | Performed by: NURSE PRACTITIONER

## 2024-04-08 PROCEDURE — 99214 OFFICE O/P EST MOD 30 MIN: CPT | Mod: 95,,, | Performed by: NURSE PRACTITIONER

## 2024-04-08 PROCEDURE — 1160F RVW MEDS BY RX/DR IN RCRD: CPT | Mod: CPTII,95,, | Performed by: NURSE PRACTITIONER

## 2024-04-08 PROCEDURE — 1159F MED LIST DOCD IN RCRD: CPT | Mod: CPTII,95,, | Performed by: NURSE PRACTITIONER

## 2024-04-08 RX ORDER — LANOLIN ALCOHOL/MO/W.PET/CERES
400 CREAM (GRAM) TOPICAL NIGHTLY
Qty: 30 TABLET | Refills: 6 | Status: SHIPPED | OUTPATIENT
Start: 2024-04-08

## 2024-04-08 RX ORDER — GABAPENTIN 300 MG/1
300 CAPSULE ORAL 3 TIMES DAILY
Qty: 90 CAPSULE | Refills: 4 | Status: SHIPPED | OUTPATIENT
Start: 2024-04-08

## 2024-04-08 RX ORDER — TIZANIDINE 2 MG/1
2 TABLET ORAL 3 TIMES DAILY PRN
Qty: 90 TABLET | Refills: 2 | Status: SHIPPED | OUTPATIENT
Start: 2024-04-08 | End: 2025-04-08

## 2024-04-08 NOTE — TELEPHONE ENCOUNTER
Spoke with patient, she would like to see Ortho. Provided her with phone number to Ortho clinic, she will call them to schedule.

## 2024-04-08 NOTE — PROGRESS NOTES
[facility] Telemedicine Follow Up Visit Note    This is a real-time audio/video visit that was performed with the originating site at patient's home and the distant site, [facility]. Verbal consent to participate in interactive audio & video visit was obtained.    I discussed with the patient regarding the nature of our telehealth visits, that:    - Our sessions are not being recorded and that personal health information is protected  - Provider would evaluate the patient and recommend diagnostics and treatments based on my assessment  - [facility] will provide follow up care in person if/when the patient needs it.   Subjective:      Patient ID: Carlota Oakley is a 54 y.o. female.    Chief Complaint: Polyneuropathy (Patient reports she is still having leg cramps, lessened since starting on the magnesium. She also c/o pain in her hands.)    HPI  This is a 54 year old right handed female with hx of HTN, Hypothyroidism, CKD III (GFR 50), Bipolar Disorder, Diffuse Large B Cell Lymphoma s/p R-CHOP (4/26/2018 - 8/9/2018) s/p Radiotherapy and chemotherapy, who was referred for hand numbness after RCHOP. Patient noted that she has numbness and burning sensation in bilateral LE and UE along muscle cramps in her hands and feet after RCHOP. States that she had numbness in bilateral hands prior to RCHOP. She also notes radial wrist pain with exertion, which leads her to drop things. 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 Does not drink. Pt was last seen on 1/8/2024. During that visit Baclofen was increased to 10mg PO TID, chinyere was increased to 200 mg in AM and afternoon, LTG 150mg PO Q day, chinyere 300mg QHS and MagOx 400mg daily were continued. X-ray of hand was ordered and Pt was referred to orthopedics. Has not heard from ortho.    Today, Pt states she continues w/numbness to L hand numbness that has worsened in last 9 mths. Cannot use  hand to turn on lamp. R hand tingling continues. Cramping to L lateral/frontal area of leg has improved, toes cramp, on MagOx and Baclofen 10mg PO BID. States it is not new, but seems to be worsening. Denies swelling or change in color. Takes chinyere nightly and chinyere 200mg BID. Works on her feel all day. No longer employed. Continues w/hand tingling and numbness w/occasional cramping into a claw. Difficulty w/holding objects as he hand will begin to cramp. Awaiting ortho eval    Current Meds -  LTG 150mg daily  Neurontin 300mg QHS (10/5/2021 - present)  Neurontin 100-300mg in AM (3/29/2022 - present) - only takes PRN - effective  Baclofen 10mg PO BID (     Review of Systems  Constitutional: no fever, fatigue, weakness  Eye: no vision loss, eye redness, drainage, or pain  ENMT: no sore throat, ear pain, sinus pain/congestion, nasal congestion/drainage  Respiratory: no cough, no wheezing, no shortness of breath  Cardiovascular: no chest pain, no palpitations, no edema  Gastrointestinal: no nausea, vomiting, or diarrhea. No abdominal pain  Genitourinary: no dysuria, no urinary frequency or urgency, no hematuria  Hema/Lymph: no abnormal bruising or bleeding  Endocrine: no heat or cold intolerance, no excessive thirst or excessive urination  Musculoskeletal: + muscle or joint pain, no joint swelling  Integumentary: no skin rash or abnormal lesion  Psychiatric: no depressed mood, + anxiety, no visual/auditory hallucinations, no delusions, no suicidal or homicidal ideation  Neurologic: as per HPI     Objective:   Physical Exam    General: well-developed well-nourished in no acute distress  Eye: clear conjunctiva, eyelids normal  HENT: oropharynx without erythema/exudate, oropharynx mucosal surface moist  Neck: full range of motion  Respiratory: no distress on RA  Cardiovascular: unable to assess due to nature of visit  Gastrointestinal: flat, no guarding or distension  Musculoskeletal: full range of motion of all  extremities/spine without limitation or discomfort  Integumentary: no rashes or skin lesions present    Neurologic:  Mental Status- Alert and Oriented to time, self, place, Speech is fluent, with intact reading and comprehension, long term memory intact, No Dysarthria.  CN II-XII - CN I Deferred, No ptosis b/l, EOMI w/o nystagmus, T/U midline, Shoulder shrug symmetric b/l, Hearing grossly intact b/l, Face Symmetric.  Motor - Tone and Bulk nml throughout, No involuntary movements, no satelliting with orbiting, FFM nml b/l, No pronator drift.  Sensory - unable to assess due to nature of visit  Reflexes - unable to assess due to nature of visit  Cerebellar Exam - FNF wnl b/l no intention tremor.  Romberg - mild sway  Gait - Normal, bilat arm swing intact    Tinels and Phalen's negative on 3/30/2021      Assessment:       This is a 54 year old right handed female with hx of HTN, Hypothyroidism, CKD III (GFR 50), Bipolar Disorder, Diffuse Large B Cell Lymphoma s/p R-CHOP (4/26/2018 - 8/9/2018) s/p Radiotherapy, who was referred for chemo related polyneuropathy. Also has a right wrist De Quervain Tenosynovitis, continues w/discomfort w/brace and gabapentin. Taking folic acid at HS, tolerating better. C/W L hand numbness and. R hand tingling. Takes chinyere 200 mg in AM, 200mg in afternoon and 300mg nightly. Continues leg and toe cramping improved on Mag Ox 400 mg daily, no longer employed, no longer standing on feet all day. No improvement on Baclofen 10 mg PO BID. Ortho referral pending. Has decreased smoking to <1 ppd.    1. Polyneuropathy    2. Muscle cramp    3. Smoking      Plan:       [] c/w LTG 150mg daily  [] increase Neurontin to 300mg TID  [] d/c Baclofen 10mg  PO TID PRN muscle cramp  [] start tizanidine 2mg PO TID PRN muscle cramp  [] c/w MagOx 400mg nightly for muscle cramps  [] c/w folic acid supplement at HS  [] c/w smoking cessation  [] referral to orthopedics - pending    RTC 4 months    I have explained the  treatment plan, diagnosis, and prognosis to the patient, caretaker, family. All questions are answered to the best of my knowledge.    Face to Face Time 30 minute, including, counseling, education, review of test results, relevant medical records, and coordination of care.

## 2024-04-08 NOTE — TELEPHONE ENCOUNTER
Can you please call the patient and ask her if she wants to schedule with ortho or just hold off for now? If she wants to schedule, then she can call and set up an appt.

## 2024-04-09 ENCOUNTER — HOSPITAL ENCOUNTER (OUTPATIENT)
Dept: RADIOLOGY | Facility: HOSPITAL | Age: 54
Discharge: HOME | End: 2024-04-09
Attending: INTERNAL MEDICINE
Payer: MEDICAID

## 2024-04-09 DIAGNOSIS — J35.8 ASYMMETRIC TONSILS: ICD-10-CM

## 2024-04-09 DIAGNOSIS — F17.200 SMOKING: ICD-10-CM

## 2024-04-09 DIAGNOSIS — Z95.828 PORT-A-CATH IN PLACE: ICD-10-CM

## 2024-04-09 DIAGNOSIS — C83.30 DIFFUSE LARGE B-CELL LYMPHOMA, UNSPECIFIED BODY REGION: ICD-10-CM

## 2024-04-09 PROCEDURE — A9552 F18 FDG: HCPCS | Performed by: INTERNAL MEDICINE

## 2024-04-09 PROCEDURE — 78815 PET IMAGE W/CT SKULL-THIGH: CPT | Mod: TC

## 2024-04-09 RX ORDER — FLUDEOXYGLUCOSE F18 500 MCI/ML
10 INJECTION INTRAVENOUS
Status: COMPLETED | OUTPATIENT
Start: 2024-04-09 | End: 2024-04-09

## 2024-04-09 RX ADMIN — FLUDEOXYGLUCOSE F-18 10.6 MILLICURIE: 500 INJECTION INTRAVENOUS at 09:04

## 2024-04-15 NOTE — PROGRESS NOTES
"   [facility] Hematology/Oncology  PROGRESS NOTE      Subjective:         NAME: Carlota Oakley : 1970     54 y.o. female   MRN: [id number]    Referring Doc: No ref. provider found    Dr. Wisam Magaña NP      Chief Complaint:    Chief Complaint   Patient presents with    Diffuse large B-cell lymphoma, unspecified body region     Pt c/o neuropathy, night sweats, and dizziness. Pt reports 3 falls since last visit. Last fall around . Pt was unbalance and has been having leg weakness.        Oncology/Hematology History:  1. DLBCL, stage II. Diag 3/2018  --R submandibular LN biopsy 3/15/18: Diffuse large B cell lymphoma.   --Positive for CD45, CD20, BCL2, and BCL6; negative for CD3, CD10, CD56 and TTF-1.   --FISH studies: No evidence of MYC rearrangement, BCL2-IGH [translocation t(14;18)] gene rearrangement or BCL6 (3q27) breakpoint translocation.  --Bone marrow biopsy 18: No evidence of malignancy.   --R-CHOP x 6 cycles, 18 - 18  --Consolidative radiotherapy 18-18    2. Bilateral breast masses  Biopsy R breast 2/15/18: Fibroadenoma  Biopsy L breast 18: Fibroadenoma    Clinical History:   Ms. Oakley is a 54 yo WF with PMH of bipolar disorder and HTN who was diagnosed with DLBCL. Pt initially noticed the "lump" under her R chin in early 2018. She went to urgent care and was prescribed a course of antibiotics. The mass didn't improve and she subsequently developed some swallowing difficulties. On 3/10/18 she was seen at [facility] and CT neck done there demonstrated a R submandibular mass measuring 4.6 x 2.6 x 2.5 cm as well as a mass invading into the R maxillary bone. It was around this time that she had a routine follow up with Dr. Wisam Eldridge (he was performing workup for bilateral breast masses) and he ordered a biopsy of the submandibular mass. Biopsy done on 3/15/18 confirmed diagnosis of diffuse large B cell lymphoma. IHC and FISH results noted " above.     Interval History:  Ms. Oakley presents today for follow-up with PET/CT results and discussion of her labs. She reports bilateral lower and upper extremity neuropathy. She is followed by neurology. She has had 2-3 falls within the last 3 months, she said most of them are related to her feeling dizzy. She is seen by Dr. Springer and has a follow up within the next two weeks. She does report night sweats. Her weight goes ups and down and this has been happening since she has the chemotherapy.  She denies SOB, chest pain, fevers, adenopathy. She take geodon and balofen which make her feel fatigue.        ROS:   Review of Systems   Constitutional:  Negative for appetite change, chills, fatigue, fever and unexpected weight change.   HENT:  Negative for facial swelling, mouth sores, nosebleeds, sinus pressure/congestion and sore throat.    Eyes:  Negative for photophobia, pain and visual disturbance.   Respiratory:  Negative for cough, chest tightness, shortness of breath and wheezing.    Cardiovascular:  Negative for chest pain, palpitations and leg swelling.   Gastrointestinal:  Negative for abdominal pain, blood in stool, change in bowel habit, constipation, diarrhea, nausea and vomiting.   Endocrine: Negative.    Genitourinary:  Negative for dysuria, frequency, hematuria, hot flashes, pelvic pain, urgency and vaginal pain.   Musculoskeletal:  Positive for back pain (Chonic). Negative for arthralgias, gait problem, joint swelling and myalgias.   Integumentary:  Negative for pallor, rash, wound, breast mass, breast discharge and breast tenderness.   Allergic/Immunologic: Negative.  Negative for immunocompromised state.   Neurological:  Negative for dizziness, vertigo, syncope, weakness, numbness and headaches.        Chronic neuropathy   Hematological:  Negative for adenopathy. Does not bruise/bleed easily.   Psychiatric/Behavioral:  Negative for behavioral problems and dysphoric mood. The patient is  nervous/anxious.    All other systems reviewed and are negative.  Breast: Negative for mass and tenderness       Allergies:  Review of patient's allergies indicates:   Allergen Reactions    Penicillins Hives     Other reaction(s): rash       Medications:    Current Outpatient Medications:     EScitalopram oxalate (LEXAPRO) 20 MG tablet, Take 20 mg by mouth once daily., Disp: , Rfl:     folic acid (FOLVITE) 1 MG tablet, TAKE 1 TABLET(1 MG) BY MOUTH EVERY DAY, Disp: 90 tablet, Rfl: 3    gabapentin (NEURONTIN) 300 MG capsule, Take 1 capsule (300 mg total) by mouth 3 (three) times daily., Disp: 90 capsule, Rfl: 4    lamoTRIgine (LAMICTAL) 150 MG Tab, Take 1 tablet (150 mg total) by mouth once daily., Disp: 90 tablet, Rfl: 3    LIDOcaine (LIDODERM) 5 %, Place 1 patch onto the skin as needed., Disp: , Rfl:     lovastatin (MEVACOR) 20 MG tablet, TK 1 T PO QD WITH THE DANNA MEAL, Disp: , Rfl: 3    magnesium oxide (MAG-OX) 400 mg (241.3 mg magnesium) tablet, Take 1 tablet (400 mg total) by mouth every evening., Disp: 30 tablet, Rfl: 6    meloxicam (MOBIC) 15 MG tablet, Take 15 mg by mouth once daily., Disp: , Rfl:     tiZANidine (ZANAFLEX) 2 MG tablet, Take 1 tablet (2 mg total) by mouth 3 (three) times daily as needed (muscle spasm)., Disp: 90 tablet, Rfl: 2    traZODone (DESYREL) 50 MG tablet, Take 100 mg by mouth every evening., Disp: , Rfl:     ziprasidone (GEODON) 40 MG Cap, Take 40 mg by mouth every evening., Disp: , Rfl:     Current Facility-Administered Medications:     heparin, porcine (PF) 100 unit/mL injection flush 500 Units, 500 Units, Intravenous, PRN, Order, MD Mandy, 500 Units at 10/13/22 1326    heparin, porcine (PF) 100 unit/mL injection flush 500 Units, 500 Units, Intravenous, PRN, OrderMandy MD, 500 Units at 01/19/24 0858    heparin, porcine (PF) 100 unit/mL injection flush 500 Units, 500 Units, Intravenous, PRN, OrderMandy MD, 500 Units at 01/12/23 1420    heparin, porcine (PF) 100  "unit/mL injection flush 500 Units, 500 Units, Intravenous, PRN, OrderMandy MD, 500 Units at 04/12/23 1400    sodium chloride 0.9% flush 10 mL, 10 mL, Intravenous, PRN, Mandy Laird MD, 10 mL at 10/13/22 1326    sodium chloride 0.9% flush 10 mL, 10 mL, Intravenous, PRN, OrderMandy MD, 10 mL at 01/12/23 1420    sodium chloride 0.9% flush 10 mL, 10 mL, Intravenous, PRN, OrderMandy MD    sodium chloride 0.9% flush 10 mL, 10 mL, Intravenous, PRN, Mandy Laird MD, 10 mL at 04/12/23 1400    PMHx/PSHx Updates:   Past Medical History:   Diagnosis Date    Cancer     Headache     Memory loss     Mental disorder     Movement disorder     Neuropathy      Past Surgical History:   Procedure Laterality Date    BONE MARROW BIOPSY      BREAST BIOPSY      COLONOSCOPY N/A 01/09/2020    CYSTOSCOPY      LYMPH NODE BIOPSY         Family History:  Family History   Problem Relation Name Age of Onset    Hypertension Mother      Diabetes Mother      Hypertension Father      Stroke Maternal Grandfather         Social History:  Social History     Socioeconomic History    Marital status: Single   Tobacco Use    Smoking status: Every Day     Current packs/day: 1.00     Types: Cigarettes    Smokeless tobacco: Never   Substance and Sexual Activity    Alcohol use: Not Currently    Drug use: Yes     Types: Marijuana     Comment: occasionally       Vitals:  Blood pressure 122/74, pulse 64, temperature 97.9 °F (36.6 °C), resp. rate 20, height 5' 2.99" (1.6 m), weight 72.1 kg (159 lb), SpO2 100%.    Wt Readings from Last 6 Encounters:   04/16/24 72.1 kg (159 lb)   04/16/24 72.1 kg (159 lb)   03/19/24 70.1 kg (154 lb 9.6 oz)   02/06/24 71 kg (156 lb 9.6 oz)   01/19/24 70.3 kg (155 lb)   01/09/24 68.8 kg (151 lb 9.6 oz)         Physical Examination:   Physical Exam  Vitals and nursing note reviewed.   Constitutional:       General: She is awake.      Appearance: Normal appearance.   HENT:      Head: Normocephalic and " atraumatic.   Eyes:      General: No scleral icterus.     Extraocular Movements: Extraocular movements intact.      Conjunctiva/sclera: Conjunctivae normal.   Neck:      Vascular: No JVD.   Cardiovascular:      Rate and Rhythm: Normal rate and regular rhythm.      Heart sounds: No murmur heard.  Pulmonary:      Effort: Pulmonary effort is normal.      Breath sounds: Normal breath sounds. No wheezing or rhonchi.   Chest:      Comments: Breast exam: deferred  Abdominal:      General: Bowel sounds are normal. There is no distension.      Palpations: Abdomen is soft.      Tenderness: There is no abdominal tenderness.   Musculoskeletal:      Cervical back: Neck supple.   Lymphadenopathy:      Head:      Right side of head: No submental or submandibular adenopathy.      Left side of head: No submental or submandibular adenopathy.      Cervical: No cervical adenopathy.      Upper Body:      Right upper body: No supraclavicular or axillary adenopathy.      Left upper body: No supraclavicular or axillary adenopathy.      Lower Body: No right inguinal adenopathy. No left inguinal adenopathy.   Skin:     General: Skin is warm.      Coloration: Skin is not jaundiced.      Findings: No lesion or rash.   Neurological:      Mental Status: She is alert and oriented to person, place, and time.      Cranial Nerves: Cranial nerves 2-12 are intact.      Motor: Weakness present.      Comments: Peripheral neuropathy   Psychiatric:         Attention and Perception: Attention normal.         Speech: Speech normal.         Behavior: Behavior is cooperative.         Cognition and Memory: Cognition normal.         Judgment: Judgment normal.       ECOG SCORE    0 - Fully active-able to carry on all pre-disease performance without restriction         Labs:   Lab Visit on 04/16/2024   Component Date Value Ref Range Status    Sodium Level 04/16/2024 139  136 - 145 mmol/L Final    Potassium Level 04/16/2024 5.9 (H)  3.5 - 5.1 mmol/L Final     Chloride 04/16/2024 104  98 - 107 mmol/L Final    Carbon Dioxide 04/16/2024 31 (H)  22 - 29 mmol/L Final    Glucose Level 04/16/2024 79  74 - 100 mg/dL Final    Blood Urea Nitrogen 04/16/2024 23.0 (H)  9.8 - 20.1 mg/dL Final    Creatinine 04/16/2024 1.51 (H)  0.55 - 1.02 mg/dL Final    Calcium Level Total 04/16/2024 9.2  8.4 - 10.2 mg/dL Final    Protein Total 04/16/2024 6.7  6.4 - 8.3 gm/dL Final    Albumin Level 04/16/2024 3.5  3.5 - 5.0 g/dL Final    Globulin 04/16/2024 3.2  2.4 - 3.5 gm/dL Final    Albumin/Globulin Ratio 04/16/2024 1.1  1.1 - 2.0 ratio Final    Bilirubin Total 04/16/2024 0.2  <=1.5 mg/dL Final    Alkaline Phosphatase 04/16/2024 66  40 - 150 unit/L Final    Alanine Aminotransferase 04/16/2024 8  0 - 55 unit/L Final    Aspartate Aminotransferase 04/16/2024 18  5 - 34 unit/L Final    eGFR 04/16/2024 40  mls/min/1.73/m2 Final    Lactate Dehydrogenase 04/16/2024 181  125 - 220 U/L Final    WBC 04/16/2024 4.38 (L)  4.50 - 11.50 x10(3)/mcL Final    RBC 04/16/2024 3.92 (L)  4.20 - 5.40 x10(6)/mcL Final    Hgb 04/16/2024 12.2  12.0 - 16.0 g/dL Final    Hct 04/16/2024 38.9  37.0 - 47.0 % Final    MCV 04/16/2024 99.2 (H)  80.0 - 94.0 fL Final    MCH 04/16/2024 31.1 (H)  27.0 - 31.0 pg Final    MCHC 04/16/2024 31.4 (L)  33.0 - 36.0 g/dL Final    RDW 04/16/2024 12.3  11.5 - 17.0 % Final    Platelet 04/16/2024 160  130 - 400 x10(3)/mcL Final    MPV 04/16/2024 9.6  7.4 - 10.4 fL Final    Neut % 04/16/2024 53.7  % Final    Lymph % 04/16/2024 30.8  % Final    Mono % 04/16/2024 9.6  % Final    Eos % 04/16/2024 3.2  % Final    Basophil % 04/16/2024 0.9  % Final    Lymph # 04/16/2024 1.35  0.6 - 4.6 x10(3)/mcL Final    Neut # 04/16/2024 2.35  2.1 - 9.2 x10(3)/mcL Final    Mono # 04/16/2024 0.42  0.1 - 1.3 x10(3)/mcL Final    Eos # 04/16/2024 0.14  0 - 0.9 x10(3)/mcL Final    Baso # 04/16/2024 0.04  <=0.2 x10(3)/mcL Final    IG# 04/16/2024 0.08 (H)  0 - 0.04 x10(3)/mcL Final    IG% 04/16/2024 1.8  % Final        Radiology/Diagnostic Studies:  CT neck 3/10/18: Indistinct soft tissue mass identified involving the right maxilla at the floor of the maxillary sinus and alveolar ridge. Matted adenopathy is identified in the R submandibular space measuring 4.6 x 2.6 x 2.5 cm.   2D echocardiogram 4/9/18: LVEF 62%   PET 4/19/18: Hypermetabolic erosive mass along the right maxillary floor/alveolar ridge. Right submandibular hypermetabolic lymph node conglomeration. Additional mildly prominent lymph nodes at left level IIa and in the low right paratracheal space with mild hypermetabolism. Nonspecific mild hypermetabolism along the posterior and posterolateral left sixth rib without correlating CT abnormality. Mild radiotracer activity associated with a lobulated soft tissue nodule in the retroareolar left breast, likely related to the recently biopsied lesion.   PET 6/20/18: Tumor regression. No new findings.   PET 8/28/18: Negative.   MRI brain 9/14/18: Mild generalized cerebral and cerebellar atrophy, normal for patient's age. Mucoperiosteal disease left maxillary sinus and ethmoid sinuses.   CT neck 11/1/18: Small lipoma at BB marker on the palpable right neck measuring up to 2.5cm.   PET 3/19/19: No evidence of hypermetabolic nika disease or suggestion of other FDG-avid or aggressive process through the head/neck, chest, abdomen, or pelvis.   PET 12/6/23: Asymmetric left tonsillar hypermetabolism with mild asymmetry on the CT images, nonspecific.  Correlation with direct visualization can be considered.  Otherwise, no tracer avid metastatic disease identified  PET/CT 4/9/24:  1. No hypermetabolic lymphadenopathy.  2. Similar mild asymmetric left tonsillar uptake.      I have reviewed all available lab results and radiology reports.    Assessment/Plan:   DLBCL (diffuse large B cell lymphoma) C83.30 MAITE x 5 years from end of chemotherapy  Pt with DLBCL, patient see by Dr. Wallace  last week and noted the patient continues  in remission. Will continue to follow. RTC 12 months or prn.  Labs are stable today and patient is asymptomatic. Continue routine follow-up.   Macrocytic anemia  Hx of macrocytosis and folate deficiency with normal vit B12.   Folate today 34.1 continue to take every other day  Neuropathy  Managed by neurology. Continue to follow with neurology   Hx Hematuria  Continue to follow with urology  Iron stores are replete.   Left Axillary Pain   Mammogram in feb 2023 was negative. F/u due Feb 2024  6. One polyp on colonosopy 1-2 years ago (she is not sure) rec repeat in 5 year    Bone and joint pain (polyarthritis) :   RF and DANIEL negative, ESR  and CRP minmally elevated. She has been following with neurology and will continue gabapentin 300mg nightly, and  daily dose of chinyere which consists of 100mg in AM and sometimes in afternoon. She was started on  Baclofen 10mg 1 PO Qday PRN muscle cramp, and referred to ortho which she will make an appointment.     Informed patient to call if she has s/s of lymphoma recurrence including adenopathy, fevers, night sweats, ongoing weight loss etc.       Discussion:   PET CT with persistent tonsillar hypermetabolism. She was evaluated by Dr Springer, ENT, that gave antibiotics and will see him sometime within the next two weeks. May need     Will order PET/CT today, to be done before rtc  Keep appt with Dr. Springer ENT   RTC 4 months with MD with labs/scans  Cbc, cmp, ldh- 1 hr prior @ [facility]    I have explained all of the above in detail and the patient understands all of the current recommendation(s). I have answered all of their questions to the best of my ability and to their complete satisfaction.   The patient is to continue with the current management plan.      Radha Melissa MD  Hematology/Oncology      Professional Services   I, Jeni Tristan LPN, acted solely as a scribe for and in the presence of Dr. Radha Melissa, who performed these services.     Addendum: CMP came with  elevated K, please see above. Will call the patient to let her know and to repeat BMP or K only to be sure is not lab error due to traumatic phlebotomy or hemolysis.

## 2024-04-16 ENCOUNTER — INFUSION (OUTPATIENT)
Dept: INFUSION THERAPY | Facility: HOSPITAL | Age: 54
End: 2024-04-16
Attending: NURSE PRACTITIONER
Payer: MEDICAID

## 2024-04-16 ENCOUNTER — TELEPHONE (OUTPATIENT)
Dept: HEMATOLOGY/ONCOLOGY | Facility: CLINIC | Age: 54
End: 2024-04-16

## 2024-04-16 ENCOUNTER — OFFICE VISIT (OUTPATIENT)
Dept: HEMATOLOGY/ONCOLOGY | Facility: CLINIC | Age: 54
End: 2024-04-16
Payer: MEDICAID

## 2024-04-16 VITALS
WEIGHT: 159 LBS | HEIGHT: 63 IN | BODY MASS INDEX: 28.17 KG/M2 | RESPIRATION RATE: 20 BRPM | HEART RATE: 64 BPM | OXYGEN SATURATION: 100 % | DIASTOLIC BLOOD PRESSURE: 74 MMHG | TEMPERATURE: 98 F | SYSTOLIC BLOOD PRESSURE: 122 MMHG

## 2024-04-16 VITALS
RESPIRATION RATE: 20 BRPM | BODY MASS INDEX: 28.17 KG/M2 | HEART RATE: 64 BPM | WEIGHT: 159 LBS | OXYGEN SATURATION: 100 % | TEMPERATURE: 98 F | DIASTOLIC BLOOD PRESSURE: 74 MMHG | HEIGHT: 63 IN | SYSTOLIC BLOOD PRESSURE: 122 MMHG

## 2024-04-16 DIAGNOSIS — C34.90 MALIGNANT NEOPLASM OF UNSPECIFIED PART OF UNSPECIFIED BRONCHUS OR LUNG: Primary | ICD-10-CM

## 2024-04-16 DIAGNOSIS — Z95.828 PORT-A-CATH IN PLACE: ICD-10-CM

## 2024-04-16 DIAGNOSIS — C83.30 DIFFUSE LARGE B-CELL LYMPHOMA, UNSPECIFIED BODY REGION: ICD-10-CM

## 2024-04-16 DIAGNOSIS — Z95.828 PORT-A-CATH IN PLACE: Primary | ICD-10-CM

## 2024-04-16 DIAGNOSIS — F17.200 SMOKING: ICD-10-CM

## 2024-04-16 PROCEDURE — 3078F DIAST BP <80 MM HG: CPT | Mod: CPTII,,, | Performed by: INTERNAL MEDICINE

## 2024-04-16 PROCEDURE — 99214 OFFICE O/P EST MOD 30 MIN: CPT | Mod: S$PBB,,, | Performed by: INTERNAL MEDICINE

## 2024-04-16 PROCEDURE — 3008F BODY MASS INDEX DOCD: CPT | Mod: CPTII,,, | Performed by: INTERNAL MEDICINE

## 2024-04-16 PROCEDURE — 99999 PR PBB SHADOW E&M-EST. PATIENT-LVL V: CPT | Mod: PBBFAC,,, | Performed by: INTERNAL MEDICINE

## 2024-04-16 PROCEDURE — 96523 IRRIG DRUG DELIVERY DEVICE: CPT | Mod: 59

## 2024-04-16 PROCEDURE — 63600175 PHARM REV CODE 636 W HCPCS: Performed by: NURSE PRACTITIONER

## 2024-04-16 PROCEDURE — 3074F SYST BP LT 130 MM HG: CPT | Mod: CPTII,,, | Performed by: INTERNAL MEDICINE

## 2024-04-16 PROCEDURE — 99215 OFFICE O/P EST HI 40 MIN: CPT | Mod: PBBFAC,25 | Performed by: INTERNAL MEDICINE

## 2024-04-16 PROCEDURE — A4216 STERILE WATER/SALINE, 10 ML: HCPCS | Performed by: NURSE PRACTITIONER

## 2024-04-16 PROCEDURE — 25000003 PHARM REV CODE 250: Performed by: NURSE PRACTITIONER

## 2024-04-16 RX ORDER — HEPARIN 100 UNIT/ML
500 SYRINGE INTRAVENOUS
OUTPATIENT
Start: 2024-04-16

## 2024-04-16 RX ORDER — SODIUM CHLORIDE 0.9 % (FLUSH) 0.9 %
10 SYRINGE (ML) INJECTION
OUTPATIENT
Start: 2024-04-16

## 2024-04-16 RX ORDER — SODIUM CHLORIDE 0.9 % (FLUSH) 0.9 %
10 SYRINGE (ML) INJECTION
Status: DISCONTINUED | OUTPATIENT
Start: 2024-04-16 | End: 2024-04-16 | Stop reason: HOSPADM

## 2024-04-16 RX ORDER — HEPARIN 100 UNIT/ML
500 SYRINGE INTRAVENOUS
Status: DISCONTINUED | OUTPATIENT
Start: 2024-04-16 | End: 2024-04-16 | Stop reason: HOSPADM

## 2024-04-16 RX ADMIN — Medication 500 UNITS: at 12:04

## 2024-04-16 RX ADMIN — SODIUM CHLORIDE, PRESERVATIVE FREE 10 ML: 5 INJECTION INTRAVENOUS at 12:04

## 2024-04-16 NOTE — TELEPHONE ENCOUNTER
Please call the patient to let her know that her potassium was elevated and will need to repeat. We can order a BMP or K only    Thanks

## 2024-04-17 DIAGNOSIS — C34.90 MALIGNANT NEOPLASM OF UNSPECIFIED PART OF UNSPECIFIED BRONCHUS OR LUNG: Primary | ICD-10-CM

## 2024-04-17 DIAGNOSIS — C83.30 DIFFUSE LARGE B-CELL LYMPHOMA, UNSPECIFIED BODY REGION: ICD-10-CM

## 2024-04-18 ENCOUNTER — HOSPITAL ENCOUNTER (OUTPATIENT)
Dept: RADIOLOGY | Facility: HOSPITAL | Age: 54
Discharge: HOME | End: 2024-04-18
Attending: UROLOGY
Payer: MEDICAID

## 2024-04-18 DIAGNOSIS — N28.1 ACQUIRED CYST OF KIDNEY: ICD-10-CM

## 2024-04-18 PROCEDURE — 76770 US EXAM ABDO BACK WALL COMP: CPT | Mod: TC

## 2024-04-23 ENCOUNTER — LAB VISIT (OUTPATIENT)
Dept: LAB | Facility: HOSPITAL | Age: 54
End: 2024-04-23
Attending: INTERNAL MEDICINE
Payer: MEDICAID

## 2024-04-23 DIAGNOSIS — C34.90 MALIGNANT NEOPLASM OF UNSPECIFIED PART OF UNSPECIFIED BRONCHUS OR LUNG: ICD-10-CM

## 2024-04-23 LAB
ALBUMIN SERPL-MCNC: 4 G/DL (ref 3.5–5)
ALBUMIN/GLOB SERPL: 1 RATIO (ref 1.1–2)
ALP SERPL-CCNC: 81 UNIT/L (ref 40–150)
ALT SERPL-CCNC: 9 UNIT/L (ref 0–55)
AST SERPL-CCNC: 19 UNIT/L (ref 5–34)
BASOPHILS # BLD AUTO: 0.04 X10(3)/MCL
BASOPHILS NFR BLD AUTO: 0.6 %
BILIRUB SERPL-MCNC: 0.3 MG/DL
BUN SERPL-MCNC: 14 MG/DL (ref 9.8–20.1)
CALCIUM SERPL-MCNC: 9.8 MG/DL (ref 8.4–10.2)
CHLORIDE SERPL-SCNC: 102 MMOL/L (ref 98–107)
CO2 SERPL-SCNC: 29 MMOL/L (ref 22–29)
CREAT SERPL-MCNC: 1.09 MG/DL (ref 0.55–1.02)
EOSINOPHIL # BLD AUTO: 0.15 X10(3)/MCL (ref 0–0.9)
EOSINOPHIL NFR BLD AUTO: 2.1 %
ERYTHROCYTE [DISTWIDTH] IN BLOOD BY AUTOMATED COUNT: 11.9 % (ref 11.5–17)
GFR SERPLBLD CREATININE-BSD FMLA CKD-EPI: 60 MLS/MIN/1.73/M2
GLOBULIN SER-MCNC: 4.2 GM/DL (ref 2.4–3.5)
GLUCOSE SERPL-MCNC: 98 MG/DL (ref 74–100)
HCT VFR BLD AUTO: 42.3 % (ref 37–47)
HGB BLD-MCNC: 13.7 G/DL (ref 12–16)
IMM GRANULOCYTES # BLD AUTO: 0.03 X10(3)/MCL (ref 0–0.04)
IMM GRANULOCYTES NFR BLD AUTO: 0.4 %
LDH SERPL-CCNC: 217 U/L (ref 125–220)
LYMPHOCYTES # BLD AUTO: 2.32 X10(3)/MCL (ref 0.6–4.6)
LYMPHOCYTES NFR BLD AUTO: 32.4 %
MCH RBC QN AUTO: 31.1 PG (ref 27–31)
MCHC RBC AUTO-ENTMCNC: 32.4 G/DL (ref 33–36)
MCV RBC AUTO: 96.1 FL (ref 80–94)
MONOCYTES # BLD AUTO: 0.44 X10(3)/MCL (ref 0.1–1.3)
MONOCYTES NFR BLD AUTO: 6.2 %
NEUTROPHILS # BLD AUTO: 4.17 X10(3)/MCL (ref 2.1–9.2)
NEUTROPHILS NFR BLD AUTO: 58.3 %
PLATELET # BLD AUTO: 252 X10(3)/MCL (ref 130–400)
PMV BLD AUTO: 10 FL (ref 7.4–10.4)
POTASSIUM SERPL-SCNC: 4.8 MMOL/L (ref 3.5–5.1)
PROT SERPL-MCNC: 8.2 GM/DL (ref 6.4–8.3)
RBC # BLD AUTO: 4.4 X10(6)/MCL (ref 4.2–5.4)
SODIUM SERPL-SCNC: 139 MMOL/L (ref 136–145)
WBC # SPEC AUTO: 7.15 X10(3)/MCL (ref 4.5–11.5)

## 2024-04-23 PROCEDURE — 85025 COMPLETE CBC W/AUTO DIFF WBC: CPT

## 2024-04-23 PROCEDURE — 80053 COMPREHEN METABOLIC PANEL: CPT

## 2024-04-23 PROCEDURE — 36415 COLL VENOUS BLD VENIPUNCTURE: CPT

## 2024-04-23 PROCEDURE — 83615 LACTATE (LD) (LDH) ENZYME: CPT

## 2024-05-28 ENCOUNTER — OFFICE VISIT (OUTPATIENT)
Dept: SURGICAL ONCOLOGY | Facility: CLINIC | Age: 54
End: 2024-05-28
Payer: MEDICAID

## 2024-05-28 VITALS
WEIGHT: 156 LBS | SYSTOLIC BLOOD PRESSURE: 96 MMHG | HEIGHT: 63 IN | BODY MASS INDEX: 27.64 KG/M2 | DIASTOLIC BLOOD PRESSURE: 62 MMHG

## 2024-05-28 DIAGNOSIS — J35.03 CHRONIC ADENOTONSILLITIS: Primary | ICD-10-CM

## 2024-05-28 DIAGNOSIS — H60.332 ACUTE SWIMMER'S EAR OF LEFT SIDE: ICD-10-CM

## 2024-05-28 PROBLEM — H60.90 OTITIS EXTERNA: Status: ACTIVE | Noted: 2024-05-28

## 2024-05-28 PROCEDURE — 99999 PR PBB SHADOW E&M-EST. PATIENT-LVL IV: CPT | Mod: PBBFAC,,,

## 2024-05-28 PROCEDURE — 99214 OFFICE O/P EST MOD 30 MIN: CPT | Mod: PBBFAC

## 2024-05-28 PROCEDURE — 3074F SYST BP LT 130 MM HG: CPT | Mod: CPTII,,,

## 2024-05-28 PROCEDURE — 99213 OFFICE O/P EST LOW 20 MIN: CPT | Mod: S$PBB,,,

## 2024-05-28 PROCEDURE — 1159F MED LIST DOCD IN RCRD: CPT | Mod: CPTII,,,

## 2024-05-28 PROCEDURE — 3078F DIAST BP <80 MM HG: CPT | Mod: CPTII,,,

## 2024-05-28 PROCEDURE — 3008F BODY MASS INDEX DOCD: CPT | Mod: CPTII,,,

## 2024-05-28 RX ORDER — BACLOFEN 10 MG/1
10 TABLET ORAL 3 TIMES DAILY
Qty: 90 TABLET | Refills: 1 | Status: SHIPPED | OUTPATIENT
Start: 2024-05-28

## 2024-05-28 RX ORDER — CIPROFLOXACIN AND DEXAMETHASONE 3; 1 MG/ML; MG/ML
4 SUSPENSION/ DROPS AURICULAR (OTIC) 2 TIMES DAILY
Qty: 7.5 ML | Refills: 1 | Status: SHIPPED | OUTPATIENT
Start: 2024-05-28

## 2024-05-28 NOTE — ASSESSMENT & PLAN NOTE
-Imaging reviewed with Dr. Springer. She looks good today with no evidence of any masses/lesions/ulcerations. The PET scan was stable from 4 months prior. We will continue to follow and will see back in 3 months for surveillance.

## 2024-05-28 NOTE — PROGRESS NOTES
[facility]  Head and Neck Surgical Oncology Clinic     Patient ID: [id number]     Chief Complaint: Follow-up ( 2mos f/u for Left tonsillar hyper metabolism/)      HPI:     Carlota Oakley is a 54 y.o. female here today for follow up.  She had abnormal uptake in the left tonsillar region on a PET scan in November of 2023.  Her previous flex laryngoscopy was normal with no signs of masses or lesions in the tonsillar region.  Her most recent PET scan in April of 2024 showed the same mild uptake in the left tonsillar region.  She is asymptomatic in this regard and does not have any spicy or citrus intolerance, odynophagia, dysphagia, weight loss. No new complaints in this regard. She does note having a sinus infection a few weeks ago and having continued left ear fullness.     Past Medical History:   Diagnosis Date    Cancer     Headache     Memory loss     Mental disorder     Movement disorder     Neuropathy         Past Surgical History:   Procedure Laterality Date    BONE MARROW BIOPSY      BREAST BIOPSY      COLONOSCOPY N/A 01/09/2020    CYSTOSCOPY      LYMPH NODE BIOPSY          Social History     Tobacco Use    Smoking status: Every Day     Current packs/day: 1.00     Types: Cigarettes    Smokeless tobacco: Never   Substance and Sexual Activity    Alcohol use: Not Currently    Drug use: Yes     Types: Marijuana     Comment: occasionally    Sexual activity: Not on file        Current Outpatient Medications   Medication Instructions    baclofen (LIORESAL) 10 mg, Oral, 3 times daily    EScitalopram oxalate (LEXAPRO) 20 mg, Oral, Daily    folic acid (FOLVITE) 1 MG tablet TAKE 1 TABLET(1 MG) BY MOUTH EVERY DAY    gabapentin (NEURONTIN) 300 mg, Oral, 3 times daily    lamoTRIgine (LAMICTAL) 150 mg, Oral, Daily    LIDOcaine (LIDODERM) 5 % 1 patch, Transdermal, As needed (PRN)    lovastatin (MEVACOR) 20 MG tablet TK 1 T PO QD WITH THE DANNA MEAL    magnesium oxide (MAG-OX) 400 mg, Oral, Nightly     "meloxicam (MOBIC) 15 mg, Oral, Daily    tiZANidine (ZANAFLEX) 2 mg, Oral, 3 times daily PRN    traZODone (DESYREL) 100 mg, Oral, Nightly    ziprasidone (GEODON) 40 mg, Oral, Nightly       Review of patient's allergies indicates:   Allergen Reactions    Penicillins Hives     Other reaction(s): rash        Patient Care Team:  Henrique Eldridge NP as PCP - General (Family Medicine)     Subjective:     Review of Systems    12 point review of systems conducted, negative except as stated in the history of present illness. See HPI for details.    Objective:     Visit Vitals  BP 96/62   Pulse (!) (P) 55   Ht 5' 3" (1.6 m)   Wt 70.8 kg (156 lb)   BMI 27.63 kg/m²       ENT Physical Exam     General: AOx3, NAD  Cardiac: Well perfused  Respiratory: Breathing without stridor or distress  Right Ear: External Auditory Canal WNL,TM w/o masses/lesions/perforations  Left Ear:  External Auditory Canal WNL,TM w/o masses/lesions/perforations. Canal edema, debris and cerumen impacted in the left ear, suctioned without difficulty  Nose: No gross nasal septal deviation.  Inferior Turbinates WNL bilaterally.  No septal perforation.  No masses/lesions.  Oral Cavity: FOM Soft, no masses palpated.  Oral Tongue mobile.  Hard Palate WNL.  Oropharynx: BOT WNL.  No masses/lesions noted.  Tonsillar fossa without lesions.  Soft palate without masses.  Midline uvula. Cryptic 1+ tonsils, no masses/lesions/ulcerations  Neck: No palpable lymphadenopathy at I - VI.    Face: House Brackmann I bilaterally.  Eyes: Normal extra ocular motion bilaterally.        Diagnostic Imaging:     Impression:     1. No hypermetabolic lymphadenopathy.  2. Similar mild asymmetric left tonsillar uptake.    All pertinent head and neck imaging independently reviewed. Discussed these findings in detail with the patient.    Assessment:       ICD-10-CM ICD-9-CM   1. Chronic adenotonsillitis  J35.03 474.02   2. Acute swimmer's ear of left side  H60.332 380.12        Plan:     1. " Chronic adenotonsillitis  Overview:  1. History of B-cell lymphoma with recent PET-CT scan with some nonspecific uptake in the left nasopharynx and left tonsillar region    Assessment & Plan:  -Imaging reviewed with Dr. Springer. She looks good today with no evidence of any masses/lesions/ulcerations. The PET scan was stable from 4 months prior. We will continue to follow and will see back in 3 months for surveillance.       2. Acute swimmer's ear of left side  Assessment & Plan:  -ciprodex drops sent in to start. Will see in private office for repeat ear debridement next week.            No follow-ups on file. In addition to their scheduled follow up, the patient has also been instructed to follow up on as needed basis.     Future Appointments   Date Time Provider Department Center   6/26/2024 11:00 AM Jeny Castañeda, NP [facility] ORTHO [place]    8/5/2024 10:30 AM Mandy Soler ANP [facility] NEURO [place]    8/20/2024 10:00 AM LAB, [facility] LAB [facility]   8/20/2024 11:00 AM Radha Melissa MD [facility] HEMONC [facility]   8/20/2024 11:30 AM CHAIR 04, [facility] CHEMOTHERAPY INFUSION [facility] CHEMO [facility]   9/3/2024 11:30 AM Zurdo Springer Jr., MD [facility]        SANDRA Toure

## 2024-05-28 NOTE — ASSESSMENT & PLAN NOTE
-ciprodex drops sent in to start. Will see in private office for repeat ear debridement next week.

## 2024-06-26 ENCOUNTER — OFFICE VISIT (OUTPATIENT)
Dept: ORTHOPEDICS | Facility: CLINIC | Age: 54
End: 2024-06-26
Payer: MEDICAID

## 2024-06-26 VITALS
DIASTOLIC BLOOD PRESSURE: 62 MMHG | HEART RATE: 77 BPM | OXYGEN SATURATION: 98 % | WEIGHT: 152.81 LBS | RESPIRATION RATE: 20 BRPM | HEIGHT: 63 IN | SYSTOLIC BLOOD PRESSURE: 102 MMHG | BODY MASS INDEX: 27.07 KG/M2 | TEMPERATURE: 99 F

## 2024-06-26 DIAGNOSIS — R20.0 BILATERAL HAND NUMBNESS: Primary | ICD-10-CM

## 2024-06-26 PROCEDURE — 99215 OFFICE O/P EST HI 40 MIN: CPT | Mod: S$PBB,,, | Performed by: NURSE PRACTITIONER

## 2024-06-26 PROCEDURE — 3078F DIAST BP <80 MM HG: CPT | Mod: CPTII,,, | Performed by: NURSE PRACTITIONER

## 2024-06-26 PROCEDURE — 3008F BODY MASS INDEX DOCD: CPT | Mod: CPTII,,, | Performed by: NURSE PRACTITIONER

## 2024-06-26 PROCEDURE — 99214 OFFICE O/P EST MOD 30 MIN: CPT | Mod: PBBFAC | Performed by: NURSE PRACTITIONER

## 2024-06-26 PROCEDURE — 1159F MED LIST DOCD IN RCRD: CPT | Mod: CPTII,,, | Performed by: NURSE PRACTITIONER

## 2024-06-26 PROCEDURE — 3074F SYST BP LT 130 MM HG: CPT | Mod: CPTII,,, | Performed by: NURSE PRACTITIONER

## 2024-06-26 PROCEDURE — 1160F RVW MEDS BY RX/DR IN RCRD: CPT | Mod: CPTII,,, | Performed by: NURSE PRACTITIONER

## 2024-06-26 NOTE — PROGRESS NOTES
[facility] - Orthopedics & Sports Medicine Clinic  Subjective:   PATIENT ID: Carlota Oaklye is a 54 y.o. female. Smoker. Employment HX: , currently disability.    Seen [facility] ortho for same DX since n/a.   CHIEF COMPLAINT: Pain and Numbness of the Right Hand and Pain and Numbness of the Left Hand    HPI:     Bilateral L < R numbness, tingling in palm and of first 1-3 digits. Right dominant   Injury/ Surgeries r/t CC: no HX of prior significant joint injury  Onset: several years ago worsening over time  Modifying Factors: exacerbated by overuse, driving; improved with shaking of hands  Associated Symptoms: hand weakness w/ dropping items, endorses sleep disturbance, denies shooting pain into arm, denies neck pain  Activity: sedentary with light activity and pain moderately interferes with ADLs .   Previous Treatments: HEP , OTC medications: Tylenol, ibuprofen, and RX medications: Mobic, robaxin, gabapentin  without symptom relief  PMH:  polyneuropathy  Family History: + OA    NOTE: New patient referred for bilateral hand numbness with minimal conservative treatments.  Symptoms affecting ADLs.     Current Outpatient Medications:     EScitalopram oxalate (LEXAPRO) 20 MG tablet, Take 20 mg by mouth once daily., Disp: , Rfl:     folic acid (FOLVITE) 1 MG tablet, TAKE 1 TABLET(1 MG) BY MOUTH EVERY DAY, Disp: 90 tablet, Rfl: 3    gabapentin (NEURONTIN) 300 MG capsule, Take 1 capsule (300 mg total) by mouth 3 (three) times daily., Disp: 90 capsule, Rfl: 4    lamoTRIgine (LAMICTAL) 150 MG Tab, Take 1 tablet (150 mg total) by mouth once daily., Disp: 90 tablet, Rfl: 3    LIDOcaine (LIDODERM) 5 %, Place 1 patch onto the skin as needed., Disp: , Rfl:     lovastatin (MEVACOR) 20 MG tablet, TK 1 T PO QD WITH THE DANNA MEAL, Disp: , Rfl: 3    magnesium oxide (MAG-OX) 400 mg (241.3 mg magnesium) tablet, Take 1 tablet (400 mg total) by mouth every evening., Disp: 30 tablet, Rfl: 6    meloxicam  (MOBIC) 15 MG tablet, Take 15 mg by mouth once daily., Disp: , Rfl:     tiZANidine (ZANAFLEX) 2 MG tablet, Take 1 tablet (2 mg total) by mouth 3 (three) times daily as needed (muscle spasm)., Disp: 90 tablet, Rfl: 2    traZODone (DESYREL) 50 MG tablet, Take 100 mg by mouth every evening., Disp: , Rfl:     ziprasidone (GEODON) 40 MG Cap, Take 40 mg by mouth every evening., Disp: , Rfl:     baclofen (LIORESAL) 10 MG tablet, TAKE 1 TABLET BY MOUTH THREE TIMES DAILY, Disp: 90 tablet, Rfl: 1    ciprofloxacin-dexAMETHasone 0.3-0.1% (CIPRODEX) 0.3-0.1 % DrpS, Place 4 drops into both ears 2 (two) times daily. (Patient not taking: Reported on 6/26/2024), Disp: 7.5 mL, Rfl: 1    Current Facility-Administered Medications:     heparin, porcine (PF) 100 unit/mL injection flush 500 Units, 500 Units, Intravenous, PRN, OrderMandy MD, 500 Units at 10/13/22 1326    heparin, porcine (PF) 100 unit/mL injection flush 500 Units, 500 Units, Intravenous, PRN, OrderMandy MD, 500 Units at 01/19/24 0858    heparin, porcine (PF) 100 unit/mL injection flush 500 Units, 500 Units, Intravenous, PRN, OrderMandy MD, 500 Units at 01/12/23 1420    heparin, porcine (PF) 100 unit/mL injection flush 500 Units, 500 Units, Intravenous, PRN, OrderMandy MD, 500 Units at 04/12/23 1400    sodium chloride 0.9% flush 10 mL, 10 mL, Intravenous, PRN, OrderMandy MD, 10 mL at 10/13/22 1326    sodium chloride 0.9% flush 10 mL, 10 mL, Intravenous, PRN, OrderMandy MD, 10 mL at 01/12/23 1420    sodium chloride 0.9% flush 10 mL, 10 mL, Intravenous, PRN, OrderMandy MD    sodium chloride 0.9% flush 10 mL, 10 mL, Intravenous, PRN, OrderMandy MD, 10 mL at 04/12/23 1400  Review of patient's allergies indicates:   Allergen Reactions    Penicillins Hives     Other reaction(s): rash       REVIEW OF SYSTEMS:  A ten-point review of systems was performed and is negative, except as mentioned above  Objective:   Body  "mass index is 27.07 kg/m².   Vitals:    06/26/24 1133   BP: 102/62   Pulse: 77   Resp: 20   Temp: 99.4 °F (37.4 °C)   TempSrc: Oral   SpO2: 98%   Weight: 69.3 kg (152 lb 12.8 oz)   Height: 5' 3" (1.6 m)   PainSc:   3      MSK Hand/ Wrist Exam  General:  no apparent distress, no pain indicators,  obesity  Inspection:  LEFT HAND RIGHT HAND   no joint swelling, no mass/ cyst noted, no soft tissue swelling, no erythema, no lesions, no contusion, no gross deformities, no nodules, no atrophy of thenar or hypothenar eminence,  no scars, no discoloration noted no joint swelling, no mass/ cyst noted, no soft tissue swelling, no erythema, no lesions, no contusion, no gross deformities, no nodules, no atrophy of thenar or hypothenar eminence,  no scars, no discoloration noted   Palpation:     LEFT HAND RIGHT HAND   no joint tenderness, normal temperature, no palpable palm nodules, no finger joint tenderness or crepitus, no active triggering or locking of digits and no tenderness of digital flexor tendons, no tenderness of thenar and hypothenar eminence no joint tenderness, normal temperature, no palpable palm nodules, no finger joint tenderness or crepitus, no active triggering or locking of digits and no tenderness of digital flexor tendons, no tenderness of thenar and hypothenar eminence     ROM LEFT HAND RIGHT HAND   Wrist Flexion / Extension   80 / 75 80 / 75   Wrist Radial / Ulnar Deviation   15 / 30 15 / 30   Thumb CMC/ MCP/ IP WNL w/o pain WNL w/o pain   Finger MCP/PIP/DIP WNL w/o pain WNL w/o pain      Strength LEFT HAND RIGHT HAND    4 / 5 w/o pain 4 / 5 w/o pain       Special Testing: L+ L-- R+ R-- Not Tested     Carpal Tunnel Syndrome         Phalen [x] [] [x] [] []    Shahriar Carpal Compression [x] [] [x] [] []    Cubital Tunnel Syndrome         Tinel's Test @ elbow [] [x] [] [x] []    De Quervain's Tenosynovitis         Finkelstein Test [] [x] [] [x] []    Ligament Injury         Scaphoid Shift Test [] [] [] [] " [x]    Lunotriquetral Shuck Test [] [] [] [] [x]    UCL Ligament Thumb Test [] [] [] [] [x]    Ulnar Fovea Sign [] [] [] [] [x]    Nerve Injury  Radial Nerve  IP joint extension against resistance intact   Median Nerve Palmar abduction of thumb intact   Anterior Interosseous Branch OK sign intact   Ulnar Nerve   Abduction of fingers against resistance intact, Froment's sign negative   Neuro/ Vascular: Spurling's Test positive, Intact to light touch, capillary refill 2 seconds,  radial pulse equal 2+, 2 point discrimination intact, Dylon's test intact  Psych: Awake, alert, oriented, normal mood and affect  Lymphatic: No LAD  Skin/ Soft Tissue: no rash, skin intact  Cardio: no edema, vascular integrity noted  Resp: no increased respiratory effort noted   Assessment:   IMAGING: XR noted in EMR dated 1/19/24  3 views of bilateral hand reviewed and independently interpreted by me with findings suggestive of arthritic changes .  Radiologist findings reviewed. Findings discussed with patient today.    EXAMINATION: XR HAND PA 1 VIEW BILATERAL 1/19/24  CLINICAL HISTORY:  Radial styloid tenosynovitis (de quervain); pain;.  COMPARISON  None available.  FINDINGS:  Multiple views reveal deformity at the 2nd DIP joint suspicious for old injury and or advanced arthritic changes.  Less advanced arthritic changes are evident at the DIP and PIP joints.  Bony structures are mildly osteopenic.  No acute fracture or dislocation is seen.  Impression:  1. Deformity at the 2nd DIP joint suspicious for old injury and or advanced arthritic changes  2. Osteoarthritis  3. MR examination would allow further evaluation if clinically indicated    Labs:    Hemoglobin A1c   Date Value Ref Range Status   03/30/2021 4.8 <<=7.0 % Final      EMG Study: none  EMR REVIEW: completed with noted Referral documentation reviewed  DX & Plan:   DIAGNOSIS: Moderate exacerbation of chronic Bilateral R = L  hand numbness complicated by possible cervical  radiculopathy and polyneuropathy     1. Bilateral hand numbness    2. BMI 27.0-27.9,adult       TREATMENT PLAN:  Orders Placed This Encounter    Ambulatory referral/consult to Neurology     Treatment Plan: EMG study needed for definitive DX. Patient referred to Dr. Rani Sandoval [phone] Address: [address] [place] [address] [place] [address]. Patient instructed to call clinic for f/u once study completed. Appropriate treatment plan will be based on EMG findings.   Ongoing education about DX and treatment recommendations including conservative treatments of daily HEP for carpal tunnel, nocturnal splinting of wrist PRN and OTC NSAID as needed according to label instructions if able to tolerate.   Procedure: n/a  RX Medications: continue medications as RX per PCP .   RTC: after EMG study complete        Leah Menard-Neumann FNP [facility]  Procedure Note:   None    Time Based Billing   Total Time Spent with Patient: 40 minutes .  Visit Start Time: 1150  10 minutes spent prior to visit reviewing EMR, prior labs and x-rays.  20 minutes spent in visit with patient face-to-face time completing exam, obtaining history, educating on DX and treatment plan.  10 minutes spent after visit completing EMR documentation.   Visit End Time: 1230     Please be aware that this note has been generated with the assistance of MMrhona voice-to-text.  Please excuse any spelling or grammatical errors.

## 2024-07-18 ENCOUNTER — HOSPITAL ENCOUNTER (OUTPATIENT)
Dept: RADIOLOGY | Facility: HOSPITAL | Age: 54
Discharge: HOME | End: 2024-07-18
Attending: NURSE PRACTITIONER
Payer: MEDICAID

## 2024-07-18 ENCOUNTER — OFFICE VISIT (OUTPATIENT)
Dept: ORTHOPEDICS | Facility: CLINIC | Age: 54
End: 2024-07-18
Payer: MEDICAID

## 2024-07-18 VITALS
WEIGHT: 158.75 LBS | TEMPERATURE: 98 F | DIASTOLIC BLOOD PRESSURE: 78 MMHG | SYSTOLIC BLOOD PRESSURE: 138 MMHG | BODY MASS INDEX: 28.13 KG/M2 | HEIGHT: 63 IN | HEART RATE: 64 BPM

## 2024-07-18 DIAGNOSIS — M17.0 PRIMARY OSTEOARTHRITIS OF BOTH KNEES: Primary | ICD-10-CM

## 2024-07-18 DIAGNOSIS — R52 PAIN: ICD-10-CM

## 2024-07-18 PROCEDURE — 1160F RVW MEDS BY RX/DR IN RCRD: CPT | Mod: CPTII,,, | Performed by: NURSE PRACTITIONER

## 2024-07-18 PROCEDURE — 3078F DIAST BP <80 MM HG: CPT | Mod: CPTII,,, | Performed by: NURSE PRACTITIONER

## 2024-07-18 PROCEDURE — 3075F SYST BP GE 130 - 139MM HG: CPT | Mod: CPTII,,, | Performed by: NURSE PRACTITIONER

## 2024-07-18 PROCEDURE — 99215 OFFICE O/P EST HI 40 MIN: CPT | Mod: S$PBB,,, | Performed by: NURSE PRACTITIONER

## 2024-07-18 PROCEDURE — 99215 OFFICE O/P EST HI 40 MIN: CPT | Mod: PBBFAC,25 | Performed by: NURSE PRACTITIONER

## 2024-07-18 PROCEDURE — 73564 X-RAY EXAM KNEE 4 OR MORE: CPT | Mod: TC,LT

## 2024-07-18 PROCEDURE — 1159F MED LIST DOCD IN RCRD: CPT | Mod: CPTII,,, | Performed by: NURSE PRACTITIONER

## 2024-07-18 PROCEDURE — 73564 X-RAY EXAM KNEE 4 OR MORE: CPT | Mod: TC,RT

## 2024-07-18 PROCEDURE — 3008F BODY MASS INDEX DOCD: CPT | Mod: CPTII,,, | Performed by: NURSE PRACTITIONER

## 2024-07-18 RX ORDER — LORATADINE 10 MG/1
10 TABLET ORAL
COMMUNITY
Start: 2024-06-09

## 2024-07-18 NOTE — PROGRESS NOTES
"   [facility] - Orthopedics & Sports Medicine Clinic  Subjective:   PATIENT ID: Carlota Oakley is a 54 y.o. female.   CHIEF COMPLAINT: Knee Pain of the Left Knee (Here with Bilat Knee pain. Pt fell on Lt Knee close to a yr.ago . Pain Level 5 today) and Knee Pain of the Right Knee    HPI:  Bilateral L > R knee pain medial stiffness and aching   Injury/ Surgical HX r/t CC:  no HX of prior significant joint injury   Onset: several years  fluctuates    Modifying Factors: exacerbated by:  increased activity, prolonged sitting, prolonged walking/ standing improved with:  movement in less than 30 minutes , rest   Associated Symptoms:  [] joint locking  [] joint catching  [x] decreased ROM  [x] crepitus [x] Weakness/ "giving out"  [x] falls  [x] swelling  [x] difficult sleeping s/t pain        Activity: sedentary with light activity and pain moderately interferes with ADLs, assistive device cane PRN   Previous Treatments:  [] HEP > 6 weeks  [] RX PT  [] Off-loading brace  [] Soft knee splint [x] OTC Pain Relievers: Tylenol, ibuprofen  [x] RX Medications: meloxicam, gabapentin, baclofen  [] CSI  [] Hyaluronic acid injections   PMH:  smoker and age over 49 yo   Family History: + OA   NOTE:  Existing patient with new compliant referred for bilateral knee pain with minimal conservative treatments.  Symptoms affecting ADLs.   Employment HX: , currently disability.       Current Outpatient Medications:     EScitalopram oxalate (LEXAPRO) 20 MG tablet, Take 20 mg by mouth once daily., Disp: , Rfl:     folic acid (FOLVITE) 1 MG tablet, TAKE 1 TABLET(1 MG) BY MOUTH EVERY DAY, Disp: 90 tablet, Rfl: 3    gabapentin (NEURONTIN) 300 MG capsule, Take 1 capsule (300 mg total) by mouth 3 (three) times daily., Disp: 90 capsule, Rfl: 4    lamoTRIgine (LAMICTAL) 150 MG Tab, Take 1 tablet (150 mg total) by mouth once daily., Disp: 90 tablet, Rfl: 3    LIDOcaine (LIDODERM) 5 %, Place 1 patch onto the skin as " needed., Disp: , Rfl:     loratadine (CLARITIN) 10 mg tablet, Take 10 mg by mouth., Disp: , Rfl:     lovastatin (MEVACOR) 20 MG tablet, TK 1 T PO QD WITH THE DANNA MEAL, Disp: , Rfl: 3    magnesium oxide (MAG-OX) 400 mg (241.3 mg magnesium) tablet, Take 1 tablet (400 mg total) by mouth every evening., Disp: 30 tablet, Rfl: 6    meloxicam (MOBIC) 15 MG tablet, Take 15 mg by mouth once daily., Disp: , Rfl:     tiZANidine (ZANAFLEX) 2 MG tablet, Take 1 tablet (2 mg total) by mouth 3 (three) times daily as needed (muscle spasm)., Disp: 90 tablet, Rfl: 2    traZODone (DESYREL) 50 MG tablet, Take 100 mg by mouth every evening., Disp: , Rfl:     ziprasidone (GEODON) 40 MG Cap, Take 40 mg by mouth every evening., Disp: , Rfl:     baclofen (LIORESAL) 10 MG tablet, TAKE 1 TABLET BY MOUTH THREE TIMES DAILY, Disp: 90 tablet, Rfl: 1    ciprofloxacin-dexAMETHasone 0.3-0.1% (CIPRODEX) 0.3-0.1 % DrpS, Place 4 drops into both ears 2 (two) times daily. (Patient not taking: Reported on 6/26/2024), Disp: 7.5 mL, Rfl: 1    Current Facility-Administered Medications:     heparin, porcine (PF) 100 unit/mL injection flush 500 Units, 500 Units, Intravenous, PRN, OrderMandy MD, 500 Units at 10/13/22 1326    heparin, porcine (PF) 100 unit/mL injection flush 500 Units, 500 Units, Intravenous, PRN, OrderMandy MD, 500 Units at 01/19/24 0858    heparin, porcine (PF) 100 unit/mL injection flush 500 Units, 500 Units, Intravenous, PRN, OrderMandy MD, 500 Units at 01/12/23 1420    heparin, porcine (PF) 100 unit/mL injection flush 500 Units, 500 Units, Intravenous, PRN, OrderMandy MD, 500 Units at 04/12/23 1400    sodium chloride 0.9% flush 10 mL, 10 mL, Intravenous, PRN, OrderMandy MD, 10 mL at 10/13/22 1326    sodium chloride 0.9% flush 10 mL, 10 mL, Intravenous, PRN, Order, MD Mandy, 10 mL at 01/12/23 1420    sodium chloride 0.9% flush 10 mL, 10 mL, Intravenous, PRN, Order, Abstracted, MD    sodium  "chloride 0.9% flush 10 mL, 10 mL, Intravenous, PRN, Order, MD Mandy, 10 mL at 04/12/23 1400  Review of patient's allergies indicates:   Allergen Reactions    Penicillins Hives     Other reaction(s): rash     REVIEW OF SYSTEMS:  A ten-point review of systems was performed and is negative, except as mentioned above   Objective:   Body mass index is 28.12 kg/m².   Vitals:    07/18/24 1301 07/18/24 1302   BP: 138/78    Pulse: 64    Temp: 98.1 °F (36.7 °C)    TempSrc: Oral    Weight: 72 kg (158 lb 11.7 oz)    Height: 5' 3" (1.6 m)    PainSc:    5     MSK Knee Exam  General:  no apparent distress, no pain indicators,  well nourished  Inspection: lower extremities in proportion with overall body habitus, no erythema   ,  no erythema, limping gait w/ full weight full  LEFT KNEE RIGHT KNEE   [x] Swelling mild  [x] Varus deformity  [] Rash [] Contusion  [] Mass  [] Scars [x] Swelling mild  [x] Varus deformity  [] Rash [] Contusion  [] Mass  [] Scars   Palpation:     LEFT KNEE RIGHT KNEE   Joint Warmth: normal  POMT: medial joint line    [x] Patellar grind with compression  [x] Crepitus  [] Joint effusion  [x] Quad atrophy  [] Active mechanical locking with joint movement  [] Popliteal fullness  [x] Tenderness medial joint line  [] Tenderness lateral joint line  [] Tenderness of tibial plateau   [] Tenderness of patellar tendon  [] Tenderness of quadriceps tendon  [] Tenderness of prepatellar   [] Tenderness of infrapatellar  [] Tenderness of anserine bursae  [x] Tenderness of patellar facet  [] Tenderness over lateral retinaculum Joint Warmth: normal  POMT: medial joint line  [x] Patellar grind with compression  [x] Crepitus  [] Joint effusion  [x] Quad atrophy  [] Active mechanical locking with joint movement  [] Popliteal fullness  [x] Tenderness medial joint line  [] Tenderness lateral joint line  [] Tenderness of tibial plateau   [] Tenderness of patellar tendon  [] Tenderness of quadriceps tendon  [] Tenderness of " prepatellar  [] Tenderness of infrapatellar  [] Tenderness of anserine bursae  [x] Tenderness of patellar facet  [] Tenderness over lateral retinaculum   ROM Active Flexion / Extension (0-140)  Left 2 / 116 w/ pain Right 1 / 119 w/ pain   Strength Flexion / Extension (5 / 5)  Left 4 / 5 Right 4 / 5     Special Testing:         Not  Tested IT Band Syndrome L+ L-- R+ R--    [] Rodrick's Test [] [x] [] [x]     Joint Effusion        [] Ballotable Effusion [] [x] [] [x]    [] Fluid Wave [] [x] [] [x]     Patellar Testing        [] Apprehension [] [x] [] [x]     Meniscal Injury        [] Pedro's Test [x] [] [x] []    [x] Thessaly's Test [] [] [] []     Ligament Injury        [] ACL Anterior Drawer [] [x] [] [x]    [] PCL Posterior Drawer [] [x] [] [x]    [] LCL Varus Test [] [x] [] [x]    [] MCL Valgus Test [] [x] [] [x]      Hip Exam normal  Ankle Exam normal  Neurovascular: Intact to light touch  Neuro/ Psych: Awake, alert, oriented, normal mood and affect  Lymphatic: No LAD  Assessment:   IMAGING:  XR Ordered by me today 4 views of bilateral knee reviewed and independently interpreted by me with noted no acute findings noted, findings suggestive of mild arthritic changes, medial joint space narrowing.  Awaiting radiologist findings.  Findings discussed with patient today.    MRI/CT: none    LABS:   Hemoglobin A1c   Date Value Ref Range Status   03/30/2021 4.8 <<=7.0 % Final     EMR REVIEW: completed with noted Referral documentation reviewed and prior visit 6/26/24 reviewed.  Diagnosis & Treatment Plan:   DIAGNOSIS: Mild exacerbation of chronic Bilateral R = L Mild knee arthritis Kellgren-Byron Grade 1 complicated by lower extremity w/ muscle weakness   1. Primary osteoarthritis of both knees    2. BMI 28.0-28.9,adult      TREATMENT PLAN:  Orders Placed This Encounter    X-Ray Knee Complete 4 Or More Views Right    X-Ray Knee Complete 4 or More Views Left     Treatment plan:    RX PT  with controlled, progressive  tendon loading and gradual resumption of activity.  instructed to contact insurance provider in order to obtain provider which takes patient's insurance   Ongoing education about DX, treatment recommendations and reasonable expectations including goal to decrease pain and increase function  Conservative treatments including, but limited to:  activity modification as needed, daily HEP with TheraBand, proper supportive footwear, non-impact muscle strengthening with use of stationary bike (RPM set at 80 or > with slow progression to goal of 40 minutes 3-4 times per week as tolerated), walking-aides as needed, adequate vit D/C, glucosamine 1500 mg/day and/or daily acetaminophen 1000 mg 3 times/ day if able to tolerate.    Weight management is paramount. Immediate BMI reduction goal 5-10% of body weight.  Achieving a BMI < 25 would be optimal for overall health and symptoms relief.   Patient aware condition has no cure and treatment plan is focused on management of symptoms.  Procedure: CSI v. VS injections discussed as treatment options in future if conservative treatments fail.   RX Medications: continue medications as RX per PCP .  RTC:  PRN if symptoms worsen or return  NOTE: None    Leah Menard-Neumann FNP [facility]  Procedure Note:   None  Time Based Billing   Total Time Spent with Patient: 40 minutes   Visit Start Time: 1300  10 minutes spent prior to visit reviewing EMR, prior labs and x-rays  20 minutes spent in visit with patient face-to-face time completing exam, obtaining history, educating on DX and treatment plan.  10 minutes spent after visit completing EMR documentation.   Visit End Time: 1340     *Please be aware that this note has been generated with the assistance of MModal voice-to-text.  Please excuse any spelling or grammatical errors. Positive findings indicted by checkmark*

## 2024-07-29 ENCOUNTER — TELEPHONE (OUTPATIENT)
Dept: ORTHOPEDICS | Facility: CLINIC | Age: 54
End: 2024-07-29
Payer: MEDICAID

## 2024-07-29 NOTE — TELEPHONE ENCOUNTER
----- Message from John Zepeda sent at 7/26/2024  1:31 PM CDT -----  Regarding: RE: Schedule pt  LM for patient to schedule an appt  ----- Message -----  From: Shawnee Hoyos MA  Sent: 7/26/2024  10:53 AM CDT  To: John Zepeda; Ariana Hunter; #  Subject: Schedule pt                                      EMG results are scanned into media, per Tommy note Pt. Needs to come in for an appt to discuss EMG results. Please schedule, thank you!

## 2024-07-30 DIAGNOSIS — R25.2 MUSCLE CRAMP: ICD-10-CM

## 2024-07-30 RX ORDER — TIZANIDINE 2 MG/1
TABLET ORAL
Qty: 90 TABLET | Refills: 2 | Status: SHIPPED | OUTPATIENT
Start: 2024-07-30

## 2024-08-05 ENCOUNTER — OFFICE VISIT (OUTPATIENT)
Dept: NEUROLOGY | Facility: CLINIC | Age: 54
End: 2024-08-05
Payer: MEDICAID

## 2024-08-05 DIAGNOSIS — G62.9 POLYNEUROPATHY: Primary | ICD-10-CM

## 2024-08-05 DIAGNOSIS — F17.200 SMOKING: ICD-10-CM

## 2024-08-05 DIAGNOSIS — R20.0 BILATERAL HAND NUMBNESS: ICD-10-CM

## 2024-08-05 DIAGNOSIS — R25.2 MUSCLE CRAMP: ICD-10-CM

## 2024-08-05 PROCEDURE — 1159F MED LIST DOCD IN RCRD: CPT | Mod: CPTII,95,, | Performed by: NURSE PRACTITIONER

## 2024-08-05 PROCEDURE — 99214 OFFICE O/P EST MOD 30 MIN: CPT | Mod: 95,,, | Performed by: NURSE PRACTITIONER

## 2024-08-05 PROCEDURE — G2211 COMPLEX E/M VISIT ADD ON: HCPCS | Mod: 95,,, | Performed by: NURSE PRACTITIONER

## 2024-08-05 PROCEDURE — 1160F RVW MEDS BY RX/DR IN RCRD: CPT | Mod: CPTII,95,, | Performed by: NURSE PRACTITIONER

## 2024-08-05 RX ORDER — TRAZODONE HYDROCHLORIDE 150 MG/1
150 TABLET ORAL NIGHTLY
COMMUNITY
Start: 2024-07-30

## 2024-08-05 RX ORDER — TIZANIDINE 2 MG/1
4 TABLET ORAL EVERY 8 HOURS
Qty: 90 TABLET | Refills: 2 | Status: SHIPPED | OUTPATIENT
Start: 2024-08-05

## 2024-08-05 RX ORDER — LAMOTRIGINE 150 MG/1
150 TABLET ORAL DAILY
Qty: 90 TABLET | Refills: 3 | Status: SHIPPED | OUTPATIENT
Start: 2024-08-05

## 2024-09-06 DIAGNOSIS — C83.30 DIFFUSE LARGE B-CELL LYMPHOMA, UNSPECIFIED BODY REGION: Primary | ICD-10-CM

## 2024-09-18 ENCOUNTER — HOSPITAL ENCOUNTER (OUTPATIENT)
Dept: RADIOLOGY | Facility: HOSPITAL | Age: 54
Discharge: HOME | End: 2024-09-18
Attending: INTERNAL MEDICINE
Payer: MEDICAID

## 2024-09-18 DIAGNOSIS — C83.30 DIFFUSE LARGE B-CELL LYMPHOMA, UNSPECIFIED BODY REGION: ICD-10-CM

## 2024-09-18 DIAGNOSIS — C34.90 MALIGNANT NEOPLASM OF UNSPECIFIED PART OF UNSPECIFIED BRONCHUS OR LUNG: ICD-10-CM

## 2024-09-18 PROCEDURE — 78815 PET IMAGE W/CT SKULL-THIGH: CPT | Mod: TC

## 2024-09-18 PROCEDURE — A9552 F18 FDG: HCPCS | Performed by: INTERNAL MEDICINE

## 2024-09-18 RX ORDER — FLUDEOXYGLUCOSE F18 500 MCI/ML
10 INJECTION INTRAVENOUS
Status: COMPLETED | OUTPATIENT
Start: 2024-09-18 | End: 2024-09-18

## 2024-09-18 RX ADMIN — FLUDEOXYGLUCOSE F-18 10.4 MILLICURIE: 500 INJECTION INTRAVENOUS at 11:09

## 2024-09-23 NOTE — PROGRESS NOTES
"   [facility] Hematology/Oncology  PROGRESS NOTE      Subjective:         NAME: Carlota Oakley : 1970     54 y.o. female   MRN: [id number]    Referring Doc: No ref. provider found    Dr. Wisam Magaña NP      Chief Complaint:    Chief Complaint   Patient presents with    Diffuse large B-cell lymphoma, unspecified body region     Pt reports R side chest pain, L side of MP. She said the pain feels like a dull poke. She sees Dr. Eldridge in .        Oncology/Hematology History:  1. DLBCL, stage II. Diag 3/2018  --R submandibular LN biopsy 3/15/18: Diffuse large B cell lymphoma.   --Positive for CD45, CD20, BCL2, and BCL6; negative for CD3, CD10, CD56 and TTF-1.   --FISH studies: No evidence of MYC rearrangement, BCL2-IGH [translocation t(14;18)] gene rearrangement or BCL6 (3q27) breakpoint translocation.  --Bone marrow biopsy 18: No evidence of malignancy.   --R-CHOP x 6 cycles, 18 - 18  --Consolidative radiotherapy 18-18    2. Bilateral breast masses  Biopsy R breast 2/15/18: Fibroadenoma  Biopsy L breast 18: Fibroadenoma    Clinical History:   Ms. Oakley is a 54 yo WF with PMH of bipolar disorder and HTN who was diagnosed with DLBCL. Pt initially noticed the "lump" under her R chin in early 2018. She went to urgent care and was prescribed a course of antibiotics. The mass didn't improve and she subsequently developed some swallowing difficulties. On 3/10/18 she was seen at [facility] and CT neck done there demonstrated a R submandibular mass measuring 4.6 x 2.6 x 2.5 cm as well as a mass invading into the R maxillary bone. It was around this time that she had a routine follow up with Dr. Wisam Eldridge (he was performing workup for bilateral breast masses) and he ordered a biopsy of the submandibular mass. Biopsy done on 3/15/18 confirmed diagnosis of diffuse large B cell lymphoma. IHC and FISH results noted above.     Interval History:  Ms. Oakley presents today " for follow-up with PET/CT results and discussion of her labs. She reports bilateral lower and upper extremity neuropathy. She is followed by neurology. She has had 2 falls within the last 3 months, she said most of them are related to her feeling dizzy. She is seen by Dr. Springer. She does report a dull pain to the left side of her mediport, she has a follow up with Dr. Eldridge in January. Her weight goes ups and down and this has been happening since she has the chemotherapy.  She denies SOB, chest pain, fevers, night sweats, adenopathy. She take geodon and balofen which make her feel fatigue.        ROS:   Review of Systems   Constitutional:  Negative for appetite change, chills, fatigue, fever and unexpected weight change.   HENT:  Negative for facial swelling, mouth sores, nosebleeds, sinus pressure/congestion and sore throat.    Eyes:  Negative for photophobia, pain and visual disturbance.   Respiratory:  Negative for cough, chest tightness, shortness of breath and wheezing.    Cardiovascular:  Negative for chest pain, palpitations and leg swelling.   Gastrointestinal:  Negative for abdominal pain, blood in stool, change in bowel habit, constipation, diarrhea, nausea and vomiting.   Endocrine: Negative.    Genitourinary:  Negative for dysuria, frequency, hematuria, hot flashes, pelvic pain, urgency and vaginal pain.   Musculoskeletal:  Positive for back pain (Chonic). Negative for arthralgias, gait problem, joint swelling and myalgias.   Integumentary:  Negative for pallor, rash, wound, breast mass, breast discharge and breast tenderness.   Allergic/Immunologic: Negative.  Negative for immunocompromised state.   Neurological:  Negative for dizziness, vertigo, syncope, weakness, numbness and headaches.        Chronic neuropathy   Hematological:  Negative for adenopathy. Does not bruise/bleed easily.   Psychiatric/Behavioral:  Negative for behavioral problems and dysphoric mood. The patient is nervous/anxious.     All other systems reviewed and are negative.  Breast: Negative for mass and tenderness       Allergies:  Review of patient's allergies indicates:   Allergen Reactions    Penicillins Hives     Other reaction(s): rash       Medications:    Current Outpatient Medications:     EScitalopram oxalate (LEXAPRO) 20 MG tablet, Take 20 mg by mouth once daily., Disp: , Rfl:     folic acid (FOLVITE) 1 MG tablet, TAKE 1 TABLET(1 MG) BY MOUTH EVERY DAY, Disp: 90 tablet, Rfl: 3    gabapentin (NEURONTIN) 300 MG capsule, Take 1 capsule (300 mg total) by mouth 3 (three) times daily., Disp: 90 capsule, Rfl: 4    lamoTRIgine (LAMICTAL) 150 MG Tab, Take 1 tablet (150 mg total) by mouth once daily., Disp: 90 tablet, Rfl: 3    LIDOcaine (LIDODERM) 5 %, Place 1 patch onto the skin as needed., Disp: , Rfl:     magnesium oxide (MAG-OX) 400 mg (241.3 mg magnesium) tablet, Take 1 tablet (400 mg total) by mouth every evening., Disp: 30 tablet, Rfl: 6    meloxicam (MOBIC) 15 MG tablet, Take 15 mg by mouth once daily., Disp: , Rfl:     tiZANidine (ZANAFLEX) 2 MG tablet, Take 2 tablets (4 mg total) by mouth every 8 (eight) hours., Disp: 90 tablet, Rfl: 2    traZODone (DESYREL) 150 MG tablet, Take 150 mg by mouth nightly., Disp: , Rfl:     ziprasidone (GEODON) 40 MG Cap, Take 40 mg by mouth every evening., Disp: , Rfl:     Current Facility-Administered Medications:     heparin, porcine (PF) 100 unit/mL injection flush 500 Units, 500 Units, Intravenous, PRN, Order, MD Mandy, 500 Units at 10/13/22 1326    heparin, porcine (PF) 100 unit/mL injection flush 500 Units, 500 Units, Intravenous, PRN, OrderMandy MD, 500 Units at 01/19/24 0858    heparin, porcine (PF) 100 unit/mL injection flush 500 Units, 500 Units, Intravenous, PRN, OrderMandy MD, 500 Units at 01/12/23 1420    heparin, porcine (PF) 100 unit/mL injection flush 500 Units, 500 Units, Intravenous, PRN, OrderMandy MD, 500 Units at 09/24/24 1004    sodium chloride  0.9% flush 10 mL, 10 mL, Intravenous, PRN, OrderMandy MD, 10 mL at 10/13/22 1326    sodium chloride 0.9% flush 10 mL, 10 mL, Intravenous, PRN, OrderMandy MD, 10 mL at 01/12/23 1420    sodium chloride 0.9% flush 10 mL, 10 mL, Intravenous, PRN, Mandy Laird MD    sodium chloride 0.9% flush 10 mL, 10 mL, Intravenous, PRN, OrderMandy MD, 10 mL at 04/12/23 1400    Facility-Administered Medications Ordered in Other Visits:     heparin, porcine (PF) 100 unit/mL injection flush 500 Units, 500 Units, Intravenous, PRN, DelaneyJason mercerelle, FNP    sodium chloride 0.9% flush 10 mL, 10 mL, Intravenous, PRN, Vonnie Delaney, FNP    PMHx/PSHx Updates:   Past Medical History:   Diagnosis Date    Cancer     Headache     Memory loss     Mental disorder     Movement disorder     Neuropathy      Past Surgical History:   Procedure Laterality Date    BONE MARROW BIOPSY      BREAST BIOPSY      COLONOSCOPY N/A 01/09/2020    CYSTOSCOPY      LYMPH NODE BIOPSY         Family History:  Family History   Problem Relation Name Age of Onset    Hypertension Mother Migdalia Codey     Diabetes Mother Migdalia Codey     Arthritis Mother Migdalia Codey     Hypertension Father Anibal Oakley     Arthritis Maternal Grandmother Belkys Juan     Heart disease Maternal Grandmother Belkys Juan     Stroke Maternal Grandfather Kat Magaña Hernandez     Early death Maternal Grandfather Eutarah Magaña Hernandez     Stroke Paternal Grandfather Veena Oakley     Arthritis Paternal Grandmother Lilli Oakley     Cancer Maternal Cousin EJ Mora     Cancer Sister Kenyetta Motley        Social History:  Social History     Socioeconomic History    Marital status: Single   Tobacco Use    Smoking status: Every Day     Current packs/day: 1.00     Average packs/day: 1 pack/day for 38.6 years (38.6 ttl pk-yrs)     Types: Cigarettes     Start date: 2/9/1986    Smokeless tobacco: Never    Tobacco comments:     I smoke a pack a day   Substance and  "Sexual Activity    Alcohol use: Never    Drug use: Yes     Frequency: 5.0 times per week     Types: Marijuana, Other-see comments     Comment: occasionally- prescription    Sexual activity: Never       Vitals:  Blood pressure (!) 149/78, pulse (!) 54, temperature 97.9 °F (36.6 °C), resp. rate 16, height 5' 2.99" (1.6 m), weight 71.3 kg (157 lb 1.6 oz), SpO2 100%.    Wt Readings from Last 6 Encounters:   09/24/24 71.3 kg (157 lb 1.6 oz)   07/18/24 72 kg (158 lb 11.7 oz)   06/26/24 69.3 kg (152 lb 12.8 oz)   05/28/24 70.8 kg (156 lb)   04/16/24 72.1 kg (159 lb)   04/16/24 72.1 kg (159 lb)     Body mass index is 27.84 kg/m².  Body surface area is 1.78 meters squared.    Physical Examination:   Physical Exam  Vitals and nursing note reviewed.   Constitutional:       General: She is awake.      Appearance: Normal appearance.   HENT:      Head: Normocephalic and atraumatic.   Eyes:      General: No scleral icterus.     Extraocular Movements: Extraocular movements intact.      Conjunctiva/sclera: Conjunctivae normal.   Neck:      Vascular: No JVD.   Cardiovascular:      Rate and Rhythm: Normal rate and regular rhythm.      Heart sounds: No murmur heard.  Pulmonary:      Effort: Pulmonary effort is normal.      Breath sounds: Normal breath sounds. No wheezing or rhonchi.   Chest:      Comments: Breast exam: deferred  Abdominal:      General: Bowel sounds are normal. There is no distension.      Palpations: Abdomen is soft. There is no hepatomegaly or splenomegaly.      Tenderness: There is no abdominal tenderness.   Musculoskeletal:      Cervical back: Neck supple.   Lymphadenopathy:      Head:      Right side of head: No submental, submandibular, tonsillar, posterior auricular or occipital adenopathy.      Left side of head: No submental, submandibular, tonsillar, posterior auricular or occipital adenopathy.      Cervical: No cervical adenopathy.      Upper Body:      Right upper body: No supraclavicular or axillary " adenopathy.      Left upper body: No supraclavicular or axillary adenopathy.      Lower Body: No right inguinal adenopathy. No left inguinal adenopathy.   Skin:     General: Skin is warm.      Coloration: Skin is not jaundiced.      Findings: No lesion or rash.   Neurological:      Mental Status: She is alert and oriented to person, place, and time.      Cranial Nerves: Cranial nerves 2-12 are intact.      Comments: Peripheral neuropathy   Psychiatric:         Attention and Perception: Attention normal.         Speech: Speech normal.         Behavior: Behavior is cooperative.         Cognition and Memory: Cognition normal.         Judgment: Judgment normal.       ECOG SCORE    1 - Restricted in strenuous activity-ambulatory and able to carry out work of a light nature         Labs:   Lab Visit on 09/24/2024   Component Date Value Ref Range Status    Sodium 09/24/2024 142  136 - 145 mmol/L Final    Potassium 09/24/2024 5.0  3.5 - 5.1 mmol/L Final    Chloride 09/24/2024 105  98 - 107 mmol/L Final    CO2 09/24/2024 32 (H)  22 - 29 mmol/L Final    Glucose 09/24/2024 85  74 - 100 mg/dL Final    Blood Urea Nitrogen 09/24/2024 22.0 (H)  9.8 - 20.1 mg/dL Final    Creatinine 09/24/2024 1.32 (H)  0.55 - 1.02 mg/dL Final    Calcium 09/24/2024 10.0  8.4 - 10.2 mg/dL Final    Protein Total 09/24/2024 7.4  6.4 - 8.3 gm/dL Final    Albumin 09/24/2024 4.1  3.5 - 5.0 g/dL Final    Globulin 09/24/2024 3.3  2.4 - 3.5 gm/dL Final    Albumin/Globulin Ratio 09/24/2024 1.2  1.1 - 2.0 ratio Final    Bilirubin Total 09/24/2024 <0.5  <=1.5 mg/dL Final    ALP 09/24/2024 62  40 - 150 unit/L Final    ALT 09/24/2024 8  0 - 55 unit/L Final    AST 09/24/2024 16  5 - 34 unit/L Final    eGFR 09/24/2024 47  mL/min/1.73/m2 Final    Anion Gap 09/24/2024 5.0  mEq/L Final    BUN/Creatinine Ratio 09/24/2024 17   Final    Lactate Dehydrogenase 09/24/2024 168  125 - 220 U/L Final    WBC 09/24/2024 6.14  4.50 - 11.50 x10(3)/mcL Final    RBC 09/24/2024 4.19  (L)  4.20 - 5.40 x10(6)/mcL Final    Hgb 09/24/2024 13.2  12.0 - 16.0 g/dL Final    Hct 09/24/2024 40.0  37.0 - 47.0 % Final    MCV 09/24/2024 95.5 (H)  80.0 - 94.0 fL Final    MCH 09/24/2024 31.5 (H)  27.0 - 31.0 pg Final    MCHC 09/24/2024 33.0  33.0 - 36.0 g/dL Final    RDW 09/24/2024 12.3  11.5 - 17.0 % Final    Platelet 09/24/2024 169  130 - 400 x10(3)/mcL Final    MPV 09/24/2024 9.5  7.4 - 10.4 fL Final    Neut % 09/24/2024 58.1  % Final    Lymph % 09/24/2024 29.8  % Final    Mono % 09/24/2024 8.0  % Final    Eos % 09/24/2024 3.1  % Final    Basophil % 09/24/2024 0.7  % Final    Lymph # 09/24/2024 1.83  0.6 - 4.6 x10(3)/mcL Final    Neut # 09/24/2024 3.57  2.1 - 9.2 x10(3)/mcL Final    Mono # 09/24/2024 0.49  0.1 - 1.3 x10(3)/mcL Final    Eos # 09/24/2024 0.19  0 - 0.9 x10(3)/mcL Final    Baso # 09/24/2024 0.04  <=0.2 x10(3)/mcL Final    IG# 09/24/2024 0.02  0 - 0.04 x10(3)/mcL Final    IG% 09/24/2024 0.3  % Final       Radiology/Diagnostic Studies:  CT neck 3/10/18: Indistinct soft tissue mass identified involving the right maxilla at the floor of the maxillary sinus and alveolar ridge. Matted adenopathy is identified in the R submandibular space measuring 4.6 x 2.6 x 2.5 cm.   2D echocardiogram 4/9/18: LVEF 62%   PET 4/19/18: Hypermetabolic erosive mass along the right maxillary floor/alveolar ridge. Right submandibular hypermetabolic lymph node conglomeration. Additional mildly prominent lymph nodes at left level IIa and in the low right paratracheal space with mild hypermetabolism. Nonspecific mild hypermetabolism along the posterior and posterolateral left sixth rib without correlating CT abnormality. Mild radiotracer activity associated with a lobulated soft tissue nodule in the retroareolar left breast, likely related to the recently biopsied lesion.   PET 6/20/18: Tumor regression. No new findings.   PET 8/28/18: Negative.   MRI brain 9/14/18: Mild generalized cerebral and cerebellar atrophy, normal  for patient's age. Mucoperiosteal disease left maxillary sinus and ethmoid sinuses.   CT neck 11/1/18: Small lipoma at BB marker on the palpable right neck measuring up to 2.5cm.   PET 3/19/19: No evidence of hypermetabolic nika disease or suggestion of other FDG-avid or aggressive process through the head/neck, chest, abdomen, or pelvis.   PET 12/6/23: Asymmetric left tonsillar hypermetabolism with mild asymmetry on the CT images, nonspecific.  Correlation with direct visualization can be considered.  Otherwise, no tracer avid metastatic disease identified  PET/CT 4/9/24:  1. No hypermetabolic lymphadenopathy.  2. Similar mild asymmetric left tonsillar uptake.  PET/CT 9/18/24: New 18 mm site of hypermetabolism in the right superior masseter, likely inflammatory. Impression:No suspicious PET findings identified       I have reviewed all available lab results and radiology reports.    Assessment/Plan:   DLBCL (diffuse large B cell lymphoma) C83.30 MAITE x 5 years from end of chemotherapy  PET/CT 9/18/24, MAITE. No role for continued imaging at this time unless clinically indicated. Continue with yearly physical exams and labs  Labs are stable today and patient is asymptomatic. Continue routine follow-up.   Macrocytic anemia  Hx of macrocytosis and folate deficiency with normal vit B12.   Folate today 34.1 continue to take every other day  Neuropathy  Managed by neurology. Continue to follow with neurology   Hx Hematuria  Continue to follow with urology  Iron stores are replete.   Left Axillary Pain   Mammogram in feb 2023 was negative. F/u due Feb 2024  6. One polyp on colonosopy 1-2 years ago (she is not sure) rec repeat in 5 year    Bone and joint pain (polyarthritis) :   RF and DANIEL negative, ESR  and CRP minmally elevated. She has been following with neurology and will continue gabapentin 300mg nightly, and  daily dose of chinyere which consists of 100mg in AM and sometimes in afternoon. She was started on  Baclofen 10mg  1 PO Qday PRN muscle cramp, and referred to ortho which she will make an appointment.     Informed patient to call if she has s/s of lymphoma recurrence including adenopathy, fevers, night sweats, ongoing weight loss etc.       Discussion:   PET CT tonsillar hypermetabolism resolved. MAITE but described a New 18 mm site of hypermetabolism in the right superior masseter, likely inflammatory. PE unremarkable. No weight loss, sweats or fever. Will cont surveillance.     Will order PET/CT today  Keep appt with Dr. Springer ENT   Keep appointment with Dr. Eldridge, will fax PET/CT to Dr. Eldridge for right-sided MediPort catheter tip in the [facility]   RTC 1 year with MD with labs/scans  Cbc, cmp, ldh- 1 hr prior @ [facility]  Mediport flush today and q3m    I have explained all of the above in detail and the patient understands all of the current recommendation(s). I have answered all of their questions to the best of my ability and to their complete satisfaction.   The patient is to continue with the current management plan.      Radha Melissa MD  Hematology/Oncology  [facility]      Professional Services   I, Jeni Tristan LPN, acted solely as a scribe for and in the presence of Dr. Radha Melissa, who performed these services.

## 2024-09-24 ENCOUNTER — LAB VISIT (OUTPATIENT)
Dept: LAB | Facility: HOSPITAL | Age: 54
End: 2024-09-24
Attending: INTERNAL MEDICINE
Payer: MEDICAID

## 2024-09-24 ENCOUNTER — OFFICE VISIT (OUTPATIENT)
Dept: HEMATOLOGY/ONCOLOGY | Facility: CLINIC | Age: 54
End: 2024-09-24
Payer: MEDICAID

## 2024-09-24 ENCOUNTER — INFUSION (OUTPATIENT)
Dept: INFUSION THERAPY | Facility: HOSPITAL | Age: 54
End: 2024-09-24
Attending: NURSE PRACTITIONER
Payer: MEDICAID

## 2024-09-24 VITALS
OXYGEN SATURATION: 100 % | WEIGHT: 157.13 LBS | TEMPERATURE: 98 F | RESPIRATION RATE: 16 BRPM | BODY MASS INDEX: 27.84 KG/M2 | DIASTOLIC BLOOD PRESSURE: 78 MMHG | HEIGHT: 63 IN | SYSTOLIC BLOOD PRESSURE: 149 MMHG | HEART RATE: 54 BPM

## 2024-09-24 DIAGNOSIS — C83.30 DIFFUSE LARGE B-CELL LYMPHOMA, UNSPECIFIED BODY REGION: Primary | ICD-10-CM

## 2024-09-24 DIAGNOSIS — Z95.828 PORT-A-CATH IN PLACE: Primary | ICD-10-CM

## 2024-09-24 DIAGNOSIS — C83.30 DIFFUSE LARGE B-CELL LYMPHOMA, UNSPECIFIED BODY REGION: ICD-10-CM

## 2024-09-24 LAB
ALBUMIN SERPL-MCNC: 4.1 G/DL (ref 3.5–5)
ALBUMIN/GLOB SERPL: 1.2 RATIO (ref 1.1–2)
ALP SERPL-CCNC: 62 UNIT/L (ref 40–150)
ALT SERPL-CCNC: 8 UNIT/L (ref 0–55)
ANION GAP SERPL CALC-SCNC: 5 MEQ/L
AST SERPL-CCNC: 16 UNIT/L (ref 5–34)
BASOPHILS # BLD AUTO: 0.04 X10(3)/MCL
BASOPHILS NFR BLD AUTO: 0.7 %
BILIRUB SERPL-MCNC: <0.5 MG/DL
BUN SERPL-MCNC: 22 MG/DL (ref 9.8–20.1)
CALCIUM SERPL-MCNC: 10 MG/DL (ref 8.4–10.2)
CHLORIDE SERPL-SCNC: 105 MMOL/L (ref 98–107)
CO2 SERPL-SCNC: 32 MMOL/L (ref 22–29)
CREAT SERPL-MCNC: 1.32 MG/DL (ref 0.55–1.02)
CREAT/UREA NIT SERPL: 17
EOSINOPHIL # BLD AUTO: 0.19 X10(3)/MCL (ref 0–0.9)
EOSINOPHIL NFR BLD AUTO: 3.1 %
ERYTHROCYTE [DISTWIDTH] IN BLOOD BY AUTOMATED COUNT: 12.3 % (ref 11.5–17)
GFR SERPLBLD CREATININE-BSD FMLA CKD-EPI: 47 ML/MIN/1.73/M2
GLOBULIN SER-MCNC: 3.3 GM/DL (ref 2.4–3.5)
GLUCOSE SERPL-MCNC: 85 MG/DL (ref 74–100)
HCT VFR BLD AUTO: 40 % (ref 37–47)
HGB BLD-MCNC: 13.2 G/DL (ref 12–16)
IMM GRANULOCYTES # BLD AUTO: 0.02 X10(3)/MCL (ref 0–0.04)
IMM GRANULOCYTES NFR BLD AUTO: 0.3 %
LDH SERPL-CCNC: 168 U/L (ref 125–220)
LYMPHOCYTES # BLD AUTO: 1.83 X10(3)/MCL (ref 0.6–4.6)
LYMPHOCYTES NFR BLD AUTO: 29.8 %
MCH RBC QN AUTO: 31.5 PG (ref 27–31)
MCHC RBC AUTO-ENTMCNC: 33 G/DL (ref 33–36)
MCV RBC AUTO: 95.5 FL (ref 80–94)
MONOCYTES # BLD AUTO: 0.49 X10(3)/MCL (ref 0.1–1.3)
MONOCYTES NFR BLD AUTO: 8 %
NEUTROPHILS # BLD AUTO: 3.57 X10(3)/MCL (ref 2.1–9.2)
NEUTROPHILS NFR BLD AUTO: 58.1 %
PLATELET # BLD AUTO: 169 X10(3)/MCL (ref 130–400)
PMV BLD AUTO: 9.5 FL (ref 7.4–10.4)
POTASSIUM SERPL-SCNC: 5 MMOL/L (ref 3.5–5.1)
PROT SERPL-MCNC: 7.4 GM/DL (ref 6.4–8.3)
RBC # BLD AUTO: 4.19 X10(6)/MCL (ref 4.2–5.4)
SODIUM SERPL-SCNC: 142 MMOL/L (ref 136–145)
WBC # BLD AUTO: 6.14 X10(3)/MCL (ref 4.5–11.5)

## 2024-09-24 PROCEDURE — 96523 IRRIG DRUG DELIVERY DEVICE: CPT

## 2024-09-24 PROCEDURE — 80053 COMPREHEN METABOLIC PANEL: CPT

## 2024-09-24 PROCEDURE — 63600175 PHARM REV CODE 636 W HCPCS

## 2024-09-24 PROCEDURE — 1159F MED LIST DOCD IN RCRD: CPT | Mod: CPTII,,, | Performed by: INTERNAL MEDICINE

## 2024-09-24 PROCEDURE — 99999 PR PBB SHADOW E&M-EST. PATIENT-LVL V: CPT | Mod: PBBFAC,,, | Performed by: INTERNAL MEDICINE

## 2024-09-24 PROCEDURE — 3008F BODY MASS INDEX DOCD: CPT | Mod: CPTII,,, | Performed by: INTERNAL MEDICINE

## 2024-09-24 PROCEDURE — 85025 COMPLETE CBC W/AUTO DIFF WBC: CPT

## 2024-09-24 PROCEDURE — 3077F SYST BP >= 140 MM HG: CPT | Mod: CPTII,,, | Performed by: INTERNAL MEDICINE

## 2024-09-24 PROCEDURE — 99214 OFFICE O/P EST MOD 30 MIN: CPT | Mod: S$PBB,,, | Performed by: INTERNAL MEDICINE

## 2024-09-24 PROCEDURE — 36415 COLL VENOUS BLD VENIPUNCTURE: CPT

## 2024-09-24 PROCEDURE — 3078F DIAST BP <80 MM HG: CPT | Mod: CPTII,,, | Performed by: INTERNAL MEDICINE

## 2024-09-24 PROCEDURE — 99215 OFFICE O/P EST HI 40 MIN: CPT | Mod: PBBFAC,25 | Performed by: INTERNAL MEDICINE

## 2024-09-24 PROCEDURE — 83615 LACTATE (LD) (LDH) ENZYME: CPT

## 2024-09-24 PROCEDURE — 1160F RVW MEDS BY RX/DR IN RCRD: CPT | Mod: CPTII,,, | Performed by: INTERNAL MEDICINE

## 2024-09-24 RX ORDER — HEPARIN 100 UNIT/ML
500 SYRINGE INTRAVENOUS
OUTPATIENT
Start: 2024-09-24

## 2024-09-24 RX ORDER — SODIUM CHLORIDE 0.9 % (FLUSH) 0.9 %
10 SYRINGE (ML) INJECTION
OUTPATIENT
Start: 2024-09-24

## 2024-09-24 RX ORDER — SODIUM CHLORIDE 0.9 % (FLUSH) 0.9 %
10 SYRINGE (ML) INJECTION
Status: DISCONTINUED | OUTPATIENT
Start: 2024-09-24 | End: 2024-09-24 | Stop reason: HOSPADM

## 2024-09-24 RX ORDER — SODIUM CHLORIDE 0.9 % (FLUSH) 0.9 %
10 SYRINGE (ML) INJECTION
Status: CANCELLED | OUTPATIENT
Start: 2024-09-24

## 2024-09-24 RX ORDER — HEPARIN 100 UNIT/ML
500 SYRINGE INTRAVENOUS
Status: CANCELLED | OUTPATIENT
Start: 2024-09-24

## 2024-09-24 RX ORDER — HEPARIN 100 UNIT/ML
500 SYRINGE INTRAVENOUS
Status: DISCONTINUED | OUTPATIENT
Start: 2024-09-24 | End: 2024-09-24 | Stop reason: HOSPADM

## 2024-09-24 RX ADMIN — HEPARIN 500 UNITS: 100 SYRINGE at 10:09

## 2024-10-15 ENCOUNTER — OFFICE VISIT (OUTPATIENT)
Dept: SURGICAL ONCOLOGY | Facility: CLINIC | Age: 54
End: 2024-10-15
Payer: MEDICAID

## 2024-10-15 VITALS
WEIGHT: 164 LBS | SYSTOLIC BLOOD PRESSURE: 118 MMHG | HEIGHT: 63 IN | DIASTOLIC BLOOD PRESSURE: 77 MMHG | HEART RATE: 62 BPM | BODY MASS INDEX: 29.06 KG/M2 | OXYGEN SATURATION: 99 %

## 2024-10-15 DIAGNOSIS — J35.03 CHRONIC ADENOTONSILLITIS: Primary | ICD-10-CM

## 2024-10-15 PROCEDURE — 99214 OFFICE O/P EST MOD 30 MIN: CPT | Mod: PBBFAC | Performed by: OTOLARYNGOLOGY

## 2024-10-15 PROCEDURE — 3074F SYST BP LT 130 MM HG: CPT | Mod: CPTII,,, | Performed by: OTOLARYNGOLOGY

## 2024-10-15 PROCEDURE — 1159F MED LIST DOCD IN RCRD: CPT | Mod: CPTII,,, | Performed by: OTOLARYNGOLOGY

## 2024-10-15 PROCEDURE — 3078F DIAST BP <80 MM HG: CPT | Mod: CPTII,,, | Performed by: OTOLARYNGOLOGY

## 2024-10-15 PROCEDURE — 3008F BODY MASS INDEX DOCD: CPT | Mod: CPTII,,, | Performed by: OTOLARYNGOLOGY

## 2024-10-15 PROCEDURE — 99999 PR PBB SHADOW E&M-EST. PATIENT-LVL IV: CPT | Mod: PBBFAC,,, | Performed by: OTOLARYNGOLOGY

## 2024-10-15 PROCEDURE — 99214 OFFICE O/P EST MOD 30 MIN: CPT | Mod: S$PBB,,, | Performed by: OTOLARYNGOLOGY

## 2024-10-15 NOTE — PROGRESS NOTES
[facility]  Head and Neck Surgical Oncology Clinic     Patient ID: [id number]     Chief Complaint: Follow-up (3 month f/u for Left tonsillar hyper metabolism/)      HPI:     Carlota Oakley is a 54 y.o. female here today for repeat check.  Recall she had uptake in the left nasopharynx and tonsillar region, and on recent PET imaging, this had resolved.  She has no further upper respiratory complaints today.    Past Medical History:   Diagnosis Date    Cancer     Headache     Memory loss     Mental disorder     Movement disorder     Neuropathy         Past Surgical History:   Procedure Laterality Date    BONE MARROW BIOPSY      BREAST BIOPSY      COLONOSCOPY N/A 01/09/2020    CYSTOSCOPY      LYMPH NODE BIOPSY          Social History     Tobacco Use    Smoking status: Every Day     Current packs/day: 1.00     Average packs/day: 1 pack/day for 38.7 years (38.7 ttl pk-yrs)     Types: Cigarettes     Start date: 2/9/1986    Smokeless tobacco: Never    Tobacco comments:     I smoke a pack a day   Substance and Sexual Activity    Alcohol use: Never    Drug use: Yes     Frequency: 5.0 times per week     Types: Marijuana, Other-see comments     Comment: occasionally- prescription    Sexual activity: Never        Current Outpatient Medications   Medication Instructions    EScitalopram oxalate (LEXAPRO) 20 mg, Daily    folic acid (FOLVITE) 1 MG tablet TAKE 1 TABLET(1 MG) BY MOUTH EVERY DAY    gabapentin (NEURONTIN) 300 mg, Oral, 3 times daily    lamoTRIgine (LAMICTAL) 150 mg, Oral, Daily    LIDOcaine (LIDODERM) 5 % 1 patch, As needed (PRN)    magnesium oxide (MAG-OX) 400 mg, Oral, Nightly    meloxicam (MOBIC) 15 mg, Daily    tiZANidine (ZANAFLEX) 4 mg, Oral, Every 8 hours    traZODone (DESYREL) 150 mg, Nightly    ziprasidone (GEODON) 40 mg, Nightly       Review of patient's allergies indicates:   Allergen Reactions    Penicillins Hives     Other reaction(s): rash        Patient Care Team:  Henrique Eldridge NP  "as PCP - General (Family Medicine)  PAMELA Moeller MD as Consulting Physician (General Surgery)     Subjective:     Review of Systems    12 point review of systems conducted, negative except as stated in the history of present illness. See HPI for details.    Objective:     Visit Vitals  /77   Pulse 62   Ht 5' 3" (1.6 m)   Wt 74.4 kg (164 lb)   SpO2 99%   BMI 29.05 kg/m²       ENT Physical Exam     General: AOx3, NAD  Cardiac: Well perfused  Respiratory: Breathing without stridor or distress  Right Ear: External Auditory Canal WNL,TM w/o masses/lesions/perforations  Left Ear:  External Auditory Canal WNL,TM w/o masses/lesions/perforations  Nose: No gross nasal septal deviation.  Inferior Turbinates WNL bilaterally.  No septal perforation.  No masses/lesions.  Oral Cavity: FOM Soft, no masses palpated.  Oral Tongue mobile.  Hard Palate WNL.  Oropharynx: BOT WNL.  No masses/lesions noted.  Tonsillar fossa without lesions.  Soft palate without masses.  Midline uvula.  Neck: No palpable lymphadenopathy at I - VI.    Face: House Brackmann I bilaterally.  Eyes: Normal extra ocular motion bilaterally.        Diagnostic Imaging:     All pertinent head and neck imaging independently reviewed. Discussed these findings in detail with the patient.    Assessment:       ICD-10-CM ICD-9-CM   1. Chronic adenotonsillitis  J35.03 474.02        Plan:     1. Chronic adenotonsillitis  Overview:  1. History of B-cell lymphoma with recent PET-CT scan with some nonspecific uptake in the left nasopharynx and left tonsillar region    Assessment & Plan:  Her recent PET scan was resolved, with no evidence of any suspicious uptake, on recent September imaging.  She has had no chronic postnasal drip or chronic tonsillitis type symptoms.  I can check her back on an as-needed basis.           No follow-ups on file. In addition to their scheduled follow up, the patient has also been instructed to follow up on as needed basis. "     Future Appointments   Date Time Provider Department Center   10/31/2024 11:20 AM Jeny Castañeda, NP [facility] ORTHO [place]    12/11/2024  9:30 AM Mandy Soler ANP [facility] NEURO [place]    12/27/2024 10:30 AM CHAIR 04, [facility] CHEMOTHERAPY INFUSION [facility]   9/23/2025 10:20 AM LAB, [facility]   9/23/2025 11:20 AM Radha Melissa MD [facility]        Zurdo Springer Jr, MD

## 2024-11-15 DIAGNOSIS — D53.9 MACROCYTIC ANEMIA: ICD-10-CM

## 2024-11-15 DIAGNOSIS — C83.30 DIFFUSE LARGE B-CELL LYMPHOMA, UNSPECIFIED BODY REGION: Primary | ICD-10-CM

## 2024-11-25 DIAGNOSIS — R25.2 MUSCLE CRAMP: ICD-10-CM

## 2024-11-25 RX ORDER — TIZANIDINE 2 MG/1
4 TABLET ORAL EVERY 8 HOURS
Qty: 90 TABLET | Refills: 0 | Status: SHIPPED | OUTPATIENT
Start: 2024-11-25

## 2024-12-02 ENCOUNTER — OFFICE VISIT (OUTPATIENT)
Dept: ORTHOPEDICS | Facility: CLINIC | Age: 54
End: 2024-12-02
Payer: MEDICAID

## 2024-12-02 VITALS — HEIGHT: 62 IN | BODY MASS INDEX: 29.44 KG/M2 | WEIGHT: 160 LBS

## 2024-12-02 DIAGNOSIS — G56.03 BILATERAL CARPAL TUNNEL SYNDROME: Primary | ICD-10-CM

## 2024-12-02 PROCEDURE — 1160F RVW MEDS BY RX/DR IN RCRD: CPT | Mod: CPTII,95,, | Performed by: NURSE PRACTITIONER

## 2024-12-02 PROCEDURE — 1159F MED LIST DOCD IN RCRD: CPT | Mod: CPTII,95,, | Performed by: NURSE PRACTITIONER

## 2024-12-02 PROCEDURE — 99214 OFFICE O/P EST MOD 30 MIN: CPT | Mod: 95,,, | Performed by: NURSE PRACTITIONER

## 2024-12-02 PROCEDURE — 3008F BODY MASS INDEX DOCD: CPT | Mod: CPTII,95,, | Performed by: NURSE PRACTITIONER

## 2024-12-02 NOTE — PROGRESS NOTES
[facility]  Subjective:   Telemedicine Consent  The patient location is: Home  The chief complaint leading to consultation is: EMG results, bilateral hand carpal tunnel  Visit type: Face-to-face video  30 minutes of total time spent on the encounter, which includes face-to-face time and non-face-to-face time preparing to see the patient (eg. review of tests), obtaining and/or reviewing separately obtained history, documenting clinical information in the electronic or other health record, independently interpreting results (not separately reported) and communicating results to the patient/family/caregiver or care coordination (not separately reported).   I have reviewed patient's name,  and picture ID if applicable.  I discussed with patient regarding medical services by telemedicine (1) informed of the relationship between the physician and patient and the respective role of any other health care provider with respect to management of the patient (2) session are not recorded and personal health information is protected; and (3) notified that he or she may decline to receive medical services by telemedicine and may withdraw from such care at any time.  Patient consented to consult by telemedicine.     PATIENT ID: Carlota Oakley is a 54 y.o. female. Smoker. Employment HX: , currently disability.    Seen [facility] ortho for same DX since n/a.   CHIEF COMPLAINT: Hand Pain of the Right Hand (Bilat hand Pain. Pt To Get EMG results today . Pain Level 5 and Constant) and Hand Pain of the Left Hand    HPI:     Bilateral L < R numbness, tingling in palm and of first 1-3 digits. Right dominant   Injury/ Surgeries r/t CC: no HX of prior significant joint injury  Onset: several years ago worsening over time  Modifying Factors: exacerbated by overuse, driving; improved with shaking of hands  Associated Symptoms: hand weakness w/ dropping items, endorses  sleep disturbance, denies shooting pain into arm, denies neck pain  Activity: sedentary with light activity and pain moderately interferes with ADLs .   Previous Treatments: HEP , OTC medications: Tylenol, ibuprofen, and RX medications: Mobic, robaxin, gabapentin  without symptom relief  PMH:  polyneuropathy  Family History: + OA    NOTE: Follow up, seen by me since 2024  for bilateral carpal tunnel syndrome.  Conservative treatments providing adequate relief at this time and is not interested in surgical treatment options at this time.  EMG study, HEP and wrist splints  given 6/26/24.  Patient reports since prior visit has started getting disability and having less issues r/t hands.  Here today for EMG study results.     Current Outpatient Medications:     EScitalopram oxalate (LEXAPRO) 20 MG tablet, Take 20 mg by mouth once daily., Disp: , Rfl:     folic acid (FOLVITE) 1 MG tablet, TAKE 1 TABLET(1 MG) BY MOUTH EVERY DAY, Disp: 90 tablet, Rfl: 3    gabapentin (NEURONTIN) 300 MG capsule, Take 1 capsule (300 mg total) by mouth 3 (three) times daily., Disp: 90 capsule, Rfl: 4    LIDOcaine (LIDODERM) 5 %, Place 1 patch onto the skin as needed., Disp: , Rfl:     magnesium oxide (MAG-OX) 400 mg (241.3 mg magnesium) tablet, Take 1 tablet (400 mg total) by mouth every evening., Disp: 30 tablet, Rfl: 6    meloxicam (MOBIC) 15 MG tablet, Take 15 mg by mouth once daily., Disp: , Rfl:     tiZANidine (ZANAFLEX) 2 MG tablet, Take 2 tablets (4 mg total) by mouth every 8 (eight) hours., Disp: 90 tablet, Rfl: 0    traZODone (DESYREL) 150 MG tablet, Take 150 mg by mouth nightly., Disp: , Rfl:     ziprasidone (GEODON) 40 MG Cap, Take 40 mg by mouth every evening., Disp: , Rfl:     lamoTRIgine (LAMICTAL) 150 MG Tab, Take 1 tablet (150 mg total) by mouth once daily., Disp: 90 tablet, Rfl: 3    Current Facility-Administered Medications:     heparin, porcine (PF) 100 unit/mL injection flush 500 Units, 500 Units, Intravenous, PRN, Order,  "MD Mandy, 500 Units at 10/13/22 1326    heparin, porcine (PF) 100 unit/mL injection flush 500 Units, 500 Units, Intravenous, PRN, Order, MD Mandy, 500 Units at 01/19/24 0858    heparin, porcine (PF) 100 unit/mL injection flush 500 Units, 500 Units, Intravenous, PRN, OrderMandy MD, 500 Units at 01/12/23 1420    heparin, porcine (PF) 100 unit/mL injection flush 500 Units, 500 Units, Intravenous, PRN, Order, MD Mandy, 500 Units at 09/24/24 1004    sodium chloride 0.9% flush 10 mL, 10 mL, Intravenous, PRN, OrderMandy MD, 10 mL at 10/13/22 1326    sodium chloride 0.9% flush 10 mL, 10 mL, Intravenous, PRN, OrderMandy MD, 10 mL at 01/12/23 1420    sodium chloride 0.9% flush 10 mL, 10 mL, Intravenous, PRN, Mandy Laird MD    sodium chloride 0.9% flush 10 mL, 10 mL, Intravenous, PRN, Order, MD Mandy, 10 mL at 04/12/23 1400  Review of patient's allergies indicates:   Allergen Reactions    Penicillins Hives     Other reaction(s): rash     REVIEW OF SYSTEMS:  A ten-point review of systems was performed and is negative, except as mentioned above  Objective:   Body mass index is 29.26 kg/m².   Vitals:    12/02/24 0920   Weight: 72.6 kg (160 lb)   Height: 5' 2" (1.575 m)   PainSc:   5      N/a telemedicine visit   Assessment:   IMAGING: XR noted in EMR dated 1/19/24  3 views of bilateral hand reviewed and independently interpreted by me with findings suggestive of arthritic changes .  Radiologist findings reviewed. Findings discussed with patient today.    EXAMINATION: XR HAND PA 1 VIEW BILATERAL 1/19/24  CLINICAL HISTORY:  Radial styloid tenosynovitis (de quervain); pain  COMPARISON  None available.  FINDINGS:  Multiple views reveal deformity at the 2nd DIP joint suspicious for old injury and or advanced arthritic changes.  Less advanced arthritic changes are evident at the DIP and PIP joints.  Bony structures are mildly osteopenic.  No acute fracture or dislocation is " seen.  Impression:  1. Deformity at the 2nd DIP joint suspicious for old injury and or advanced arthritic changes  2. Osteoarthritis  3. MR examination would allow further evaluation if clinically indicated    Hemoglobin A1c   Date Value Ref Range Status   03/30/2021 4.8 <<=7.0 % Final      EMG Study: dated 7/18/24 per Dr. Saravia:  This is an abnormal study.  There is electrodiagnostic evidence of bilateral median neuropathy at the wrist, carpal tunnel syndrome, moderate on right and mild left.  EMR REVIEW: completed with noted Referral documentation reviewed and prior visit 6/26/24 reviewed.  DX & Plan:   DIAGNOSIS: Moderate exacerbation of chronic Bilateral L < R  Carpal Tunnel Syndrome moderate right, mild left     1. Bilateral carpal tunnel syndrome    2. BMI 29.0-29.9,adult      TREATMENT PLAN:     Treatment Plan: Patient reports adequate relief at this time and is clinically mild symptoms with exam.  Patient does not desire further injections and conservative treatments at this time. I recommend discussing with PCP continuing medication regimen and consistently doing HEP 2-3 times per week in order to maintain desired symptom relief.  Patient encouraged to f/u with PCP for continued primary care and RTC in future if symptoms return.   Ongoing education about DX and treatment recommendations including conservative treatments of daily HEP for carpal tunnel, nocturnal splinting of wrist PRN and OTC NSAID as needed according to label instructions if able to tolerate.   Procedure: n/a  RX Medications: continue medications as RX per PCP .   RTC: PRN if symptoms worsen or return        Jeny Zapatassandra [facility]  Procedure Note:   None    Time Based Billing   Total Time Spent with Patient: 30 minutes  Visit Start Time: 0930  10 minutes spent prior to visit reviewing EMR, prior labs and x-rays.  10 minutes spent in face-to-face visit with patient for medical discussion,  obtaining history, educating on DX and treatment plan.  10 minutes spent after visit completing EMR documentation.   Visit End Time: 1000    Please be aware that this note has been generated with the assistance of MModal voice-to-text.  Please excuse any spelling or grammatical errors.

## 2024-12-10 ENCOUNTER — PATIENT MESSAGE (OUTPATIENT)
Dept: ENDOSCOPY | Facility: HOSPITAL | Age: 54
End: 2024-12-10
Payer: MEDICAID

## 2024-12-27 DIAGNOSIS — Z12.11 SCREENING FOR COLON CANCER: Primary | ICD-10-CM

## 2024-12-27 RX ORDER — POLYETHYLENE GLYCOL 3350, SODIUM SULFATE, SODIUM CHLORIDE, POTASSIUM CHLORIDE, SODIUM ASCORBATE, AND ASCORBIC ACID 7.5-2.691G
2000 KIT ORAL ONCE
Qty: 1 KIT | Refills: 0 | Status: SHIPPED | OUTPATIENT
Start: 2024-12-27 | End: 2024-12-27

## 2025-01-24 ENCOUNTER — INFUSION (OUTPATIENT)
Dept: INFUSION THERAPY | Facility: HOSPITAL | Age: 55
End: 2025-01-24
Attending: NURSE PRACTITIONER
Payer: MEDICAID

## 2025-01-24 VITALS
RESPIRATION RATE: 20 BRPM | BODY MASS INDEX: 30.36 KG/M2 | HEIGHT: 62 IN | OXYGEN SATURATION: 100 % | WEIGHT: 165 LBS | TEMPERATURE: 98 F | HEART RATE: 74 BPM | DIASTOLIC BLOOD PRESSURE: 58 MMHG | SYSTOLIC BLOOD PRESSURE: 95 MMHG

## 2025-01-24 DIAGNOSIS — Z95.828 PORT-A-CATH IN PLACE: Primary | ICD-10-CM

## 2025-01-24 PROCEDURE — 96523 IRRIG DRUG DELIVERY DEVICE: CPT

## 2025-01-24 PROCEDURE — 63600175 PHARM REV CODE 636 W HCPCS

## 2025-01-24 PROCEDURE — A4216 STERILE WATER/SALINE, 10 ML: HCPCS

## 2025-01-24 PROCEDURE — 25000003 PHARM REV CODE 250

## 2025-01-24 RX ORDER — SODIUM CHLORIDE 0.9 % (FLUSH) 0.9 %
10 SYRINGE (ML) INJECTION
Status: DISCONTINUED | OUTPATIENT
Start: 2025-01-24 | End: 2025-01-24 | Stop reason: HOSPADM

## 2025-01-24 RX ORDER — HEPARIN 100 UNIT/ML
500 SYRINGE INTRAVENOUS
OUTPATIENT
Start: 2025-01-24

## 2025-01-24 RX ORDER — SODIUM CHLORIDE 0.9 % (FLUSH) 0.9 %
10 SYRINGE (ML) INJECTION
OUTPATIENT
Start: 2025-01-24

## 2025-01-24 RX ORDER — HEPARIN 100 UNIT/ML
500 SYRINGE INTRAVENOUS
Status: DISCONTINUED | OUTPATIENT
Start: 2025-01-24 | End: 2025-01-24 | Stop reason: HOSPADM

## 2025-01-24 RX ADMIN — SODIUM CHLORIDE, PRESERVATIVE FREE 10 ML: 5 INJECTION INTRAVENOUS at 01:01

## 2025-01-24 RX ADMIN — HEPARIN SODIUM (PORCINE) LOCK FLUSH IV SOLN 100 UNIT/ML 500 UNITS: 100 SOLUTION at 01:01

## 2025-02-27 DIAGNOSIS — R25.2 MUSCLE CRAMP: ICD-10-CM

## 2025-02-27 RX ORDER — LANOLIN ALCOHOL/MO/W.PET/CERES
CREAM (GRAM) TOPICAL
Qty: 30 TABLET | Refills: 6 | Status: SHIPPED | OUTPATIENT
Start: 2025-02-27

## 2025-04-25 ENCOUNTER — INFUSION (OUTPATIENT)
Facility: HOSPITAL | Age: 55
End: 2025-04-25
Attending: NURSE PRACTITIONER
Payer: MEDICAID

## 2025-04-25 VITALS
TEMPERATURE: 98 F | WEIGHT: 172 LBS | HEART RATE: 64 BPM | BODY MASS INDEX: 31.46 KG/M2 | RESPIRATION RATE: 18 BRPM | OXYGEN SATURATION: 97 % | DIASTOLIC BLOOD PRESSURE: 82 MMHG | SYSTOLIC BLOOD PRESSURE: 159 MMHG

## 2025-04-25 DIAGNOSIS — Z95.828 PORT-A-CATH IN PLACE: Primary | ICD-10-CM

## 2025-04-25 PROCEDURE — 96523 IRRIG DRUG DELIVERY DEVICE: CPT

## 2025-04-25 PROCEDURE — 63600175 PHARM REV CODE 636 W HCPCS

## 2025-04-25 RX ORDER — HEPARIN 100 UNIT/ML
500 SYRINGE INTRAVENOUS
OUTPATIENT
Start: 2025-04-25

## 2025-04-25 RX ORDER — SODIUM CHLORIDE 0.9 % (FLUSH) 0.9 %
10 SYRINGE (ML) INJECTION
OUTPATIENT
Start: 2025-04-25

## 2025-04-25 RX ORDER — HEPARIN 100 UNIT/ML
500 SYRINGE INTRAVENOUS
Status: DISCONTINUED | OUTPATIENT
Start: 2025-04-25 | End: 2025-04-25 | Stop reason: HOSPADM

## 2025-04-25 RX ORDER — SODIUM CHLORIDE 0.9 % (FLUSH) 0.9 %
10 SYRINGE (ML) INJECTION
Status: DISCONTINUED | OUTPATIENT
Start: 2025-04-25 | End: 2025-04-25 | Stop reason: HOSPADM

## 2025-04-25 RX ADMIN — Medication 500 UNITS: at 11:04

## 2025-05-28 ENCOUNTER — OFFICE VISIT (OUTPATIENT)
Dept: HEMATOLOGY/ONCOLOGY | Facility: CLINIC | Age: 55
End: 2025-05-28
Payer: MEDICAID

## 2025-05-28 VITALS
WEIGHT: 174.63 LBS | BODY MASS INDEX: 30.94 KG/M2 | HEART RATE: 93 BPM | DIASTOLIC BLOOD PRESSURE: 77 MMHG | TEMPERATURE: 98 F | SYSTOLIC BLOOD PRESSURE: 126 MMHG | OXYGEN SATURATION: 99 % | HEIGHT: 63 IN | RESPIRATION RATE: 14 BRPM

## 2025-05-28 DIAGNOSIS — D53.9 MACROCYTIC ANEMIA: ICD-10-CM

## 2025-05-28 DIAGNOSIS — Z85.72 HISTORY OF DIFFUSE LARGE B-CELL LYMPHOMA: ICD-10-CM

## 2025-05-28 RX ORDER — ZIPRASIDONE HYDROCHLORIDE 20 MG/1
20 CAPSULE ORAL EVERY MORNING
COMMUNITY

## 2025-05-28 RX ORDER — BACLOFEN 20 MG/1
20 TABLET ORAL 2 TIMES DAILY
COMMUNITY

## 2025-05-28 RX ORDER — TRAZODONE HYDROCHLORIDE 100 MG/1
200 TABLET ORAL NIGHTLY
COMMUNITY

## 2025-05-28 RX ORDER — LORATADINE 10 MG/1
10 TABLET ORAL DAILY
COMMUNITY

## 2025-05-28 RX ORDER — GABAPENTIN 300 MG/1
300 CAPSULE ORAL 3 TIMES DAILY
COMMUNITY

## 2025-05-29 PROBLEM — D53.9 MACROCYTIC ANEMIA: Status: ACTIVE | Noted: 2025-05-29

## 2025-05-29 PROBLEM — C83.30 DIFFUSE LARGE B-CELL LYMPHOMA: Status: ACTIVE | Noted: 2025-05-29

## 2025-06-09 ENCOUNTER — HOSPITAL ENCOUNTER (OUTPATIENT)
Dept: RADIOLOGY | Facility: HOSPITAL | Age: 55
Discharge: HOME | End: 2025-06-09
Attending: STUDENT IN AN ORGANIZED HEALTH CARE EDUCATION/TRAINING PROGRAM
Payer: MEDICAID

## 2025-06-09 DIAGNOSIS — Z85.72 HISTORY OF DIFFUSE LARGE B-CELL LYMPHOMA: ICD-10-CM

## 2025-06-09 PROCEDURE — 78815 PET IMAGE W/CT SKULL-THIGH: CPT | Mod: TC

## 2025-06-09 PROCEDURE — A9552 F18 FDG: HCPCS | Performed by: STUDENT IN AN ORGANIZED HEALTH CARE EDUCATION/TRAINING PROGRAM

## 2025-06-09 RX ORDER — FLUDEOXYGLUCOSE F18 500 MCI/ML
10 INJECTION INTRAVENOUS
Status: COMPLETED | OUTPATIENT
Start: 2025-06-09 | End: 2025-06-09

## 2025-06-09 RX ADMIN — FLUDEOXYGLUCOSE F-18 10 MILLICURIE: 500 INJECTION INTRAVENOUS at 08:06

## 2025-06-25 ENCOUNTER — OFFICE VISIT (OUTPATIENT)
Dept: HEMATOLOGY/ONCOLOGY | Facility: CLINIC | Age: 55
End: 2025-06-25
Payer: MEDICAID

## 2025-06-25 VITALS
TEMPERATURE: 98 F | HEART RATE: 72 BPM | DIASTOLIC BLOOD PRESSURE: 71 MMHG | BODY MASS INDEX: 33.25 KG/M2 | HEIGHT: 62 IN | WEIGHT: 180.69 LBS | RESPIRATION RATE: 14 BRPM | SYSTOLIC BLOOD PRESSURE: 112 MMHG | OXYGEN SATURATION: 97 %

## 2025-06-25 DIAGNOSIS — D53.9 MACROCYTIC ANEMIA: ICD-10-CM

## 2025-06-25 DIAGNOSIS — Z85.72 HISTORY OF DIFFUSE LARGE B-CELL LYMPHOMA: Primary | ICD-10-CM

## 2025-06-25 RX ORDER — GABAPENTIN 600 MG/1
600 TABLET ORAL 3 TIMES DAILY
COMMUNITY

## 2025-06-25 NOTE — PROGRESS NOTES
"     NAME: Carlota Oakley : 1970     55 y.o. female   MRN: [id number]    Referring Doc: No ref. provider found    Dr. Wisam Magaña NP      Chief Complaint:    No chief complaint on file.      Oncology/Hematology History:  1. DLBCL, stage II. Diag 3/2018  --R submandibular LN biopsy 3/15/18: Diffuse large B cell lymphoma.   --Positive for CD45, CD20, BCL2, and BCL6; negative for CD3, CD10, CD56 and TTF-1.   --FISH studies: No evidence of MYC rearrangement, BCL2-IGH [translocation t(14;18)] gene rearrangement or BCL6 (3q27) breakpoint translocation.  --Bone marrow biopsy 18: No evidence of malignancy.   --R-CHOP x 6 cycles, 18 - 18  --Consolidative radiotherapy 18-18    2. Bilateral breast masses  Biopsy R breast 2/15/18: Fibroadenoma  Biopsy L breast 18: Fibroadenoma    Clinical History:   Ms. Oakley is a 54 yo WF with PMH of bipolar disorder and HTN who was diagnosed with DLBCL. Pt initially noticed the "lump" under her R chin in early 2018. She went to urgent care and was prescribed a course of antibiotics. The mass didn't improve and she subsequently developed some swallowing difficulties. On 3/10/18 she was seen at [facility] and CT neck done there demonstrated a R submandibular mass measuring 4.6 x 2.6 x 2.5 cm as well as a mass invading into the R maxillary bone. It was around this time that she had a routine follow up with Dr. Wisam Eldridge (he was performing workup for bilateral breast masses) and he ordered a biopsy of the submandibular mass. Biopsy done on 3/15/18 confirmed diagnosis of diffuse large B cell lymphoma. IHC and FISH results noted above.   Treatment history as above.    Patient was following up with Dr. Melissa and was on surveillance with last follow-up visit with her in 2024.  She was referred to our clinic at [facility] for logistical reasons given that she lives close to this clinic  Patient presented to clinic on " 05/28/2025 to establish care       Interval History:  Today, 06/25/2025, patient continues reports symptoms of fatigue and generalized body ache.  She denies any other acute concerns.  She denies any new medications, ER or hospital visits since last follow-up visit with us.          Labs:     05/28/2025 creatinine 1.26, , TSH 2.9, folic acid 13.5, B12 463, WBC count 6.9, hemoglobin 13.3, platelet count 219, ANC 4.2    05/29/2025 peripheral blood smear:  Normochromic normocytic red cell population with differential with normal indices.  Normal white cell count.  Normal platelet count with normal morphology    Radiology/Diagnostic Studies:  CT neck 3/10/18: Indistinct soft tissue mass identified involving the right maxilla at the floor of the maxillary sinus and alveolar ridge. Matted adenopathy is identified in the R submandibular space measuring 4.6 x 2.6 x 2.5 cm.   2D echocardiogram 4/9/18: LVEF 62%   PET 4/19/18: Hypermetabolic erosive mass along the right maxillary floor/alveolar ridge. Right submandibular hypermetabolic lymph node conglomeration. Additional mildly prominent lymph nodes at left level IIa and in the low right paratracheal space with mild hypermetabolism. Nonspecific mild hypermetabolism along the posterior and posterolateral left sixth rib without correlating CT abnormality. Mild radiotracer activity associated with a lobulated soft tissue nodule in the retroareolar left breast, likely related to the recently biopsied lesion.   PET 6/20/18: Tumor regression. No new findings.   PET 8/28/18: Negative.   MRI brain 9/14/18: Mild generalized cerebral and cerebellar atrophy, normal for patient's age. Mucoperiosteal disease left maxillary sinus and ethmoid sinuses.   CT neck 11/1/18: Small lipoma at BB marker on the palpable right neck measuring up to 2.5cm.   PET 3/19/19: No evidence of hypermetabolic nika disease or suggestion of other FDG-avid or aggressive process through the head/neck,  chest, abdomen, or pelvis.   PET 12/6/23: Asymmetric left tonsillar hypermetabolism with mild asymmetry on the CT images, nonspecific.  Correlation with direct visualization can be considered.  Otherwise, no tracer avid metastatic disease identified  PET/CT 4/9/24:  1. No hypermetabolic lymphadenopathy.  2. Similar mild asymmetric left tonsillar uptake.  PET/CT 9/18/24: New 18 mm site of hypermetabolism in the right superior masseter, likely inflammatory. Impression:No suspicious PET findings identified     06/09/2025 PET-CT no evidence of FDG avid disease on this exam    Past Medical History:   Diagnosis Date    Cancer     Headache     Memory loss     Mental disorder     Movement disorder     Neuropathy        Past Surgical History:   Procedure Laterality Date    BONE MARROW BIOPSY      BREAST BIOPSY      COLONOSCOPY N/A 01/09/2020    CYSTOSCOPY      LYMPH NODE BIOPSY         Family History   Problem Relation Name Age of Onset    Hypertension Mother Migdalia Codey     Diabetes Mother Migdalia Codey     Arthritis Mother Migdalia Codey     Hypertension Father Anibal Oakley     Arthritis Maternal Grandmother Belkys Juan     Heart disease Maternal Grandmother Belkys Juan     Stroke Maternal Grandfather Kat Magaña Hernandez     Early death Maternal Grandfather Eutarah Gómez Hernandez     Stroke Paternal Grandfather Lovlolita Oakley     Arthritis Paternal Grandmother Lilli Oakley     Cancer Maternal Cousin EJ Mora     Cancer Sister Kenyettatahir Motley        Social History     Socioeconomic History    Marital status: Single   Tobacco Use    Smoking status: Every Day     Current packs/day: 1.00     Average packs/day: 1 pack/day for 39.4 years (39.4 ttl pk-yrs)     Types: Cigarettes     Start date: 2/9/1986    Smokeless tobacco: Never    Tobacco comments:     I smoke a pack a day   Substance and Sexual Activity    Alcohol use: Never    Drug use: Yes     Frequency: 5.0 times per week     Types: Marijuana, Other-see comments      Comment: occasionally- prescription    Sexual activity: Never       Current Medications[1]    Review of patient's allergies indicates:   Allergen Reactions    Penicillins Hives     Other reaction(s): rash       ROS:   CONSTITUTIONAL: no fevers, no chills, no weight loss, no fatigue, no weakness  HEMATOLOGIC: no abnormal bleeding, no abnormal bruising, no drenching night sweats  ONCOLOGIC: no new masses or lumps  HEENT: no vision loss, no tinnitus or hearing loss, no nose bleeding, no dysphagia, no odynophagia  CVS: no chest pain, no palpitations, no dyspnea on exertion  RESP: no shortness of breath, no hemoptysis, no cough  GI: no nausea, no vomiting, no diarrhea, no constipation, no melena, no hematochezia, no hematemesis, no abdominal pain, no increase in abdominal girth  : no dysuria, no hematuria, no hesitancy, no scrotal swelling, no discharge  INTEGUMENT: no rashes, no abnormal bruising, no nail pitting, no hyperpigmentation  NEURO: no falls, no memory loss, no paresthesias or dysesthesias, no urofecal incontinence or retention, no loss of strength on any extremity  MSK: no back pain, no new joint pain, no joint swelling  PSYCH: no suicidal or homicidal ideation, no depression, no insomnia, no anhedonia  ENDOCRINE: no heat or cold intolerance, no polyuria, no polydipsia    Physical Exam:  Vitals:    06/25/25 1417   BP: 112/71   Pulse: 72   Resp: 14   Temp: 98.1 °F (36.7 °C)       GA: AAOx3, NAD  HEENT:  moist oral mucous membranes  RESP:  Equal chest rise, no accessory muscle use  EXT: no deformities, no pedal edema  SKIN: no rashes, warm and dry  NEURO: normal mentation, no gross neuro deficits            Assessment/Plan:   # DLBCL (diffuse large B cell lymphoma) C83.30 MAITE x 5 years from end of chemotherapy  PET/CT 9/18/24, MAITE. No role for continued imaging at this time unless clinically indicated. Continue with yearly physical exams and labs  PET-CT in June 2025 with no evidence of disease    #  Macrocytic anemia  Hx of macrocytosis and folate deficiency with normal vit B12.   continue to take every other day  Noted normal folic acid level, vitamin B12 level and TSH in May 2025  Peripheral blood smear in May 2025 without any obvious abnormalities      Plan     Plan to follow-up in 1 year, labs the day prior   Imaging as clinically indicated   Follow-up with surgery to discuss port removal next month        Portions of the record may have been created with voice recognition software. Occasional wrong-word or sound-a-like substitutions may have occurred due to the inherent limitations of voice recognition software.              [1]   Current Outpatient Medications   Medication Sig Dispense Refill    baclofen (LIORESAL) 20 MG tablet Take 20 mg by mouth 2 (two) times daily.      EScitalopram oxalate (LEXAPRO) 20 MG tablet Take 20 mg by mouth once daily.      folic acid (FOLVITE) 1 MG tablet TAKE 1 TABLET(1 MG) BY MOUTH EVERY DAY 90 tablet 3    gabapentin (NEURONTIN) 600 MG tablet Take 600 mg by mouth 3 (three) times daily.      lamoTRIgine (LAMICTAL) 150 MG Tab Take 1 tablet (150 mg total) by mouth once daily. 90 tablet 3    LIDOcaine (LIDODERM) 5 % Place 1 patch onto the skin as needed.      loratadine (CLARITIN) 10 mg tablet Take 10 mg by mouth once daily.      magnesium oxide (MAG-OX) 400 mg (241.3 mg magnesium) tablet TAKE 1 TABLET(400 MG) BY MOUTH EVERY EVENING 30 tablet 6    meloxicam (MOBIC) 15 MG tablet Take 15 mg by mouth once daily.      traZODone (DESYREL) 100 MG tablet Take 200 mg by mouth every evening.      ziprasidone (GEODON) 20 MG Cap Take 20 mg by mouth every morning.      ziprasidone (GEODON) 40 MG Cap Take 40 mg by mouth every evening.      gabapentin (NEURONTIN) 300 MG capsule Take 1 capsule (300 mg total) by mouth 3 (three) times daily. 90 capsule 4    gabapentin (NEURONTIN) 300 MG capsule Take 300 mg by mouth 3 (three) times daily.      tiZANidine (ZANAFLEX) 2 MG tablet Take 2 tablets  (4 mg total) by mouth every 8 (eight) hours. 90 tablet 0    traZODone (DESYREL) 150 MG tablet Take 150 mg by mouth nightly.       Current Facility-Administered Medications   Medication Dose Route Frequency Provider Last Rate Last Admin    heparin, porcine (PF) 100 unit/mL injection flush 500 Units  500 Units Intravenous PRN Order, MD Mandy   500 Units at 10/13/22 1326    heparin, porcine (PF) 100 unit/mL injection flush 500 Units  500 Units Intravenous PRN Order, MD Mandy   500 Units at 01/19/24 0858    heparin, porcine (PF) 100 unit/mL injection flush 500 Units  500 Units Intravenous PRN Order, MD Mandy   500 Units at 01/12/23 1420    heparin, porcine (PF) 100 unit/mL injection flush 500 Units  500 Units Intravenous PRN Order, MD Mandy   500 Units at 09/24/24 1004    sodium chloride 0.9% flush 10 mL  10 mL Intravenous PRN Order, MD Mandy   10 mL at 10/13/22 1326    sodium chloride 0.9% flush 10 mL  10 mL Intravenous PRN Order, MD Mandy   10 mL at 01/12/23 1420    sodium chloride 0.9% flush 10 mL  10 mL Intravenous PRN Order, MD Mandy        sodium chloride 0.9% flush 10 mL  10 mL Intravenous PRN Order, MD Mandy   10 mL at 04/12/23 1400

## 2025-06-27 ENCOUNTER — HOSPITAL ENCOUNTER (OUTPATIENT)
Dept: RADIOLOGY | Facility: HOSPITAL | Age: 55
Discharge: HOME | End: 2025-06-27
Payer: MEDICAID

## 2025-06-27 VITALS — HEIGHT: 62 IN | BODY MASS INDEX: 33.13 KG/M2 | WEIGHT: 180 LBS

## 2025-06-27 DIAGNOSIS — Z12.31 ENCOUNTER FOR SCREENING MAMMOGRAM FOR MALIGNANT NEOPLASM OF BREAST: ICD-10-CM

## 2025-06-27 PROCEDURE — 77063 BREAST TOMOSYNTHESIS BI: CPT | Mod: TC

## 2025-06-30 NOTE — PROGRESS NOTES
Health Maintenance       COVID-19 Vaccine (2 - 2024-25 season)  Overdue since 9/1/2024    Hepatitis B Vaccine (1 of 3 - 19+ 3-dose series)  Never done           Following review of the above:  Patient is not proceeding with: COVID-19 and Hep B    Note: Refer to final orders and clinician documentation.       "     NAME: Carlota Oakley : 1970     55 y.o. female   MRN: [id number]    Referring Doc: Radha Melissa MD Dr. Ben Sabbaghian Felicia Joseph, NP      Chief Complaint:    No chief complaint on file.      Oncology/Hematology History:  1. DLBCL, stage II. Diag 3/2018  --R submandibular LN biopsy 3/15/18: Diffuse large B cell lymphoma.   --Positive for CD45, CD20, BCL2, and BCL6; negative for CD3, CD10, CD56 and TTF-1.   --FISH studies: No evidence of MYC rearrangement, BCL2-IGH [translocation t(14;18)] gene rearrangement or BCL6 (3q27) breakpoint translocation.  --Bone marrow biopsy 18: No evidence of malignancy.   --R-CHOP x 6 cycles, 18 - 18  --Consolidative radiotherapy 18-18    2. Bilateral breast masses  Biopsy R breast 2/15/18: Fibroadenoma  Biopsy L breast 18: Fibroadenoma    Clinical History:   Ms. Oakley is a 54 yo WF with PMH of bipolar disorder and HTN who was diagnosed with DLBCL. Pt initially noticed the "lump" under her R chin in early 2018. She went to urgent care and was prescribed a course of antibiotics. The mass didn't improve and she subsequently developed some swallowing difficulties. On 3/10/18 she was seen at [facility] and CT neck done there demonstrated a R submandibular mass measuring 4.6 x 2.6 x 2.5 cm as well as a mass invading into the R maxillary bone. It was around this time that she had a routine follow up with Dr. Wisam Eldridge (he was performing workup for bilateral breast masses) and he ordered a biopsy of the submandibular mass. Biopsy done on 3/15/18 confirmed diagnosis of diffuse large B cell lymphoma. IHC and FISH results noted above.   Treatment history as above.    Patient was following up with Dr. Melissa and was on surveillance with last follow-up visit with her in 2024.  She was referred to our clinic at [facility] for logistical reasons given that she lives close to this clinic  Patient presented to clinic " on 05/28/2025 to establish care       Interval History:  Today, 05/28/2025, patient reports symptoms of drenching night sweats, weight gain, denies any new lumps or bumps, fevers, chills.  She denies any new medications, ER or hospital visits since last follow-up with us            Labs:         Radiology/Diagnostic Studies:  CT neck 3/10/18: Indistinct soft tissue mass identified involving the right maxilla at the floor of the maxillary sinus and alveolar ridge. Matted adenopathy is identified in the R submandibular space measuring 4.6 x 2.6 x 2.5 cm.   2D echocardiogram 4/9/18: LVEF 62%   PET 4/19/18: Hypermetabolic erosive mass along the right maxillary floor/alveolar ridge. Right submandibular hypermetabolic lymph node conglomeration. Additional mildly prominent lymph nodes at left level IIa and in the low right paratracheal space with mild hypermetabolism. Nonspecific mild hypermetabolism along the posterior and posterolateral left sixth rib without correlating CT abnormality. Mild radiotracer activity associated with a lobulated soft tissue nodule in the retroareolar left breast, likely related to the recently biopsied lesion.   PET 6/20/18: Tumor regression. No new findings.   PET 8/28/18: Negative.   MRI brain 9/14/18: Mild generalized cerebral and cerebellar atrophy, normal for patient's age. Mucoperiosteal disease left maxillary sinus and ethmoid sinuses.   CT neck 11/1/18: Small lipoma at BB marker on the palpable right neck measuring up to 2.5cm.   PET 3/19/19: No evidence of hypermetabolic nika disease or suggestion of other FDG-avid or aggressive process through the head/neck, chest, abdomen, or pelvis.   PET 12/6/23: Asymmetric left tonsillar hypermetabolism with mild asymmetry on the CT images, nonspecific.  Correlation with direct visualization can be considered.  Otherwise, no tracer avid metastatic disease identified  PET/CT 4/9/24:  1. No hypermetabolic lymphadenopathy.  2. Similar mild  asymmetric left tonsillar uptake.  PET/CT 9/18/24: New 18 mm site of hypermetabolism in the right superior masseter, likely inflammatory. Impression:No suspicious PET findings identified     Past Medical History:   Diagnosis Date    Cancer     Headache     Memory loss     Mental disorder     Movement disorder     Neuropathy        Past Surgical History:   Procedure Laterality Date    BONE MARROW BIOPSY      BREAST BIOPSY      COLONOSCOPY N/A 01/09/2020    CYSTOSCOPY      LYMPH NODE BIOPSY         Family History   Problem Relation Name Age of Onset    Hypertension Mother Migdaliatoribio Alegre     Diabetes Mother Migdaliatoribio Alegre     Arthritis Mother Migdalia Alegre     Hypertension Father Anibal Oakley     Arthritis Maternal Grandmother Belkys Juan     Heart disease Maternal Grandmother Belkys Juan     Stroke Maternal Grandfather Kat Magaña Hernandez     Early death Maternal Grandfather Kat Magaña Hernandez     Stroke Paternal Grandfather Veena Oakley     Arthritis Paternal Grandmother Lilli Oakley     Cancer Maternal Cousin EJ Mora     Cancer Sister Kenyetta Motley        Social History     Socioeconomic History    Marital status: Single   Tobacco Use    Smoking status: Every Day     Current packs/day: 1.00     Average packs/day: 1 pack/day for 39.3 years (39.3 ttl pk-yrs)     Types: Cigarettes     Start date: 2/9/1986    Smokeless tobacco: Never    Tobacco comments:     I smoke a pack a day   Substance and Sexual Activity    Alcohol use: Never    Drug use: Yes     Frequency: 5.0 times per week     Types: Marijuana, Other-see comments     Comment: occasionally- prescription    Sexual activity: Never       Current Medications[1]    Review of patient's allergies indicates:   Allergen Reactions    Penicillins Hives     Other reaction(s): rash       ROS:   CONSTITUTIONAL: no fevers, no chills, no weight loss, no fatigue, no weakness  HEMATOLOGIC: no abnormal bleeding, no abnormal bruising, no drenching night  sweats  ONCOLOGIC: no new masses or lumps  HEENT: no vision loss, no tinnitus or hearing loss, no nose bleeding, no dysphagia, no odynophagia  CVS: no chest pain, no palpitations, no dyspnea on exertion  RESP: no shortness of breath, no hemoptysis, no cough  GI: no nausea, no vomiting, no diarrhea, no constipation, no melena, no hematochezia, no hematemesis, no abdominal pain, no increase in abdominal girth  : no dysuria, no hematuria, no hesitancy, no scrotal swelling, no discharge  INTEGUMENT: no rashes, no abnormal bruising, no nail pitting, no hyperpigmentation  NEURO: no falls, no memory loss, no paresthesias or dysesthesias, no urofecal incontinence or retention, no loss of strength on any extremity  MSK: no back pain, no new joint pain, no joint swelling  PSYCH: no suicidal or homicidal ideation, no depression, no insomnia, no anhedonia  ENDOCRINE: no heat or cold intolerance, no polyuria, no polydipsia    Physical Exam:  Vitals:    05/28/25 1058   BP: 126/77   Pulse: 93   Resp: 14   Temp: 97.9 °F (36.6 °C)       ECOG PS 0  GA: AAOx3, NAD  HEENT:  EOMI, moist oral mucous membranes  LYMPH: no cervical, axillary or supraclavicular adenopathy appreciated on my exam  CVS: s1s2 RRR, no M/R/G  RESP: CTA b/l, no crackles, no wheezes or rhonchi  ABD: soft, NT, ND, BS+, no hepatosplenomegaly  EXT: no deformities, no pedal edema  SKIN: no rashes, warm and dry  NEURO: normal mentation, strength 5/5 on all 4 extremities, no sensory deficits        Assessment/Plan:   # DLBCL (diffuse large B cell lymphoma) C83.30 MAITE x 5 years from end of chemotherapy  PET/CT 9/18/24, MAITE. No role for continued imaging at this time unless clinically indicated. Continue with yearly physical exams and labs      # Macrocytic anemia  Hx of macrocytosis and folate deficiency with normal vit B12.   continue to take every other day      Plan     PET-CT given symptoms of drenching night sweats and prior history of diffuse large B-cell lymphoma    CBC, CMP, LDH today   Plan to follow-up in clinic in 4 weeks to discuss above workup and further treatment recommendations      A total of  60 minutes were spent in review of records, interpretation of test, coordination of care, discussion and counseling with the patient.        Portions of the record may have been created with voice recognition software. Occasional wrong-word or sound-a-like substitutions may have occurred due to the inherent limitations of voice recognition software.            [1]   Current Outpatient Medications   Medication Sig Dispense Refill    baclofen (LIORESAL) 20 MG tablet Take 20 mg by mouth 2 (two) times daily.      EScitalopram oxalate (LEXAPRO) 20 MG tablet Take 20 mg by mouth once daily.      folic acid (FOLVITE) 1 MG tablet TAKE 1 TABLET(1 MG) BY MOUTH EVERY DAY 90 tablet 3    gabapentin (NEURONTIN) 300 MG capsule Take 300 mg by mouth 3 (three) times daily.      lamoTRIgine (LAMICTAL) 150 MG Tab Take 1 tablet (150 mg total) by mouth once daily. 90 tablet 3    LIDOcaine (LIDODERM) 5 % Place 1 patch onto the skin as needed.      loratadine (CLARITIN) 10 mg tablet Take 10 mg by mouth once daily.      magnesium oxide (MAG-OX) 400 mg (241.3 mg magnesium) tablet TAKE 1 TABLET(400 MG) BY MOUTH EVERY EVENING 30 tablet 6    meloxicam (MOBIC) 15 MG tablet Take 15 mg by mouth once daily.      traZODone (DESYREL) 100 MG tablet Take 200 mg by mouth every evening.      ziprasidone (GEODON) 20 MG Cap Take 20 mg by mouth every morning.      ziprasidone (GEODON) 40 MG Cap Take 40 mg by mouth every evening.      gabapentin (NEURONTIN) 300 MG capsule Take 1 capsule (300 mg total) by mouth 3 (three) times daily. 90 capsule 4    tiZANidine (ZANAFLEX) 2 MG tablet Take 2 tablets (4 mg total) by mouth every 8 (eight) hours. 90 tablet 0    traZODone (DESYREL) 150 MG tablet Take 150 mg by mouth nightly.       Current Facility-Administered Medications   Medication Dose Route Frequency Provider Last Rate  Last Admin    heparin, porcine (PF) 100 unit/mL injection flush 500 Units  500 Units Intravenous PRN Order, MD Mandy   500 Units at 10/13/22 1326    heparin, porcine (PF) 100 unit/mL injection flush 500 Units  500 Units Intravenous PRN Order, MD Mandy   500 Units at 01/19/24 0858    heparin, porcine (PF) 100 unit/mL injection flush 500 Units  500 Units Intravenous PRN Order, MD Mandy   500 Units at 01/12/23 1420    heparin, porcine (PF) 100 unit/mL injection flush 500 Units  500 Units Intravenous PRN Order, MD Mandy   500 Units at 09/24/24 1004    sodium chloride 0.9% flush 10 mL  10 mL Intravenous PRN Order, MD Mandy   10 mL at 10/13/22 1326    sodium chloride 0.9% flush 10 mL  10 mL Intravenous PRN Order, MD Mandy   10 mL at 01/12/23 1420    sodium chloride 0.9% flush 10 mL  10 mL Intravenous PRN Order, MD Mandy        sodium chloride 0.9% flush 10 mL  10 mL Intravenous PRN Order, MD Mandy   10 mL at 04/12/23 1400

## 2025-08-25 ENCOUNTER — CLINICAL SUPPORT (OUTPATIENT)
Dept: RESPIRATORY THERAPY | Facility: HOSPITAL | Age: 55
End: 2025-08-25
Attending: SURGERY
Payer: MEDICAID

## 2025-08-25 ENCOUNTER — HOSPITAL ENCOUNTER (OUTPATIENT)
Dept: RADIOLOGY | Facility: HOSPITAL | Age: 55
Discharge: HOME | End: 2025-08-25
Attending: SURGERY
Payer: MEDICAID

## 2025-08-25 DIAGNOSIS — T82.594A: ICD-10-CM

## 2025-08-25 DIAGNOSIS — Z01.811 PRE-OP CHEST EXAM: ICD-10-CM

## 2025-08-25 DIAGNOSIS — T82.594A: Primary | ICD-10-CM

## 2025-08-25 LAB
OHS QRS DURATION: 84 MS
OHS QTC CALCULATION: 489 MS

## 2025-08-25 PROCEDURE — 93005 ELECTROCARDIOGRAM TRACING: CPT

## 2025-08-25 PROCEDURE — 71046 X-RAY EXAM CHEST 2 VIEWS: CPT | Mod: TC

## 2025-08-25 PROCEDURE — 93010 ELECTROCARDIOGRAM REPORT: CPT | Mod: ,,, | Performed by: INTERNAL MEDICINE

## 2025-08-27 ENCOUNTER — ANESTHESIA EVENT (OUTPATIENT)
Dept: SURGERY | Facility: HOSPITAL | Age: 55
End: 2025-08-27
Payer: MEDICAID

## 2025-08-27 RX ORDER — FENTANYL CITRATE 50 UG/ML
25 INJECTION, SOLUTION INTRAMUSCULAR; INTRAVENOUS EVERY 5 MIN PRN
Refills: 0 | OUTPATIENT
Start: 2025-08-27

## 2025-08-27 RX ORDER — SODIUM CHLORIDE 0.9 % (FLUSH) 0.9 %
10 SYRINGE (ML) INJECTION
OUTPATIENT
Start: 2025-08-27

## 2025-08-27 RX ORDER — GLUCAGON 1 MG
1 KIT INJECTION
OUTPATIENT
Start: 2025-08-27

## 2025-08-27 RX ORDER — HYDROMORPHONE HYDROCHLORIDE 2 MG/ML
0.2 INJECTION, SOLUTION INTRAMUSCULAR; INTRAVENOUS; SUBCUTANEOUS EVERY 5 MIN PRN
Refills: 0 | OUTPATIENT
Start: 2025-08-27

## 2025-08-28 ENCOUNTER — HOSPITAL ENCOUNTER (OUTPATIENT)
Facility: HOSPITAL | Age: 55
Discharge: HOME | End: 2025-08-28
Attending: SURGERY | Admitting: SURGERY
Payer: MEDICAID

## 2025-08-28 ENCOUNTER — ANESTHESIA (OUTPATIENT)
Dept: SURGERY | Facility: HOSPITAL | Age: 55
End: 2025-08-28
Payer: MEDICAID

## 2025-08-28 VITALS
HEIGHT: 62 IN | BODY MASS INDEX: 30.76 KG/M2 | HEART RATE: 90 BPM | SYSTOLIC BLOOD PRESSURE: 164 MMHG | DIASTOLIC BLOOD PRESSURE: 79 MMHG | WEIGHT: 167.13 LBS | OXYGEN SATURATION: 98 % | RESPIRATION RATE: 18 BRPM | TEMPERATURE: 97 F

## 2025-08-28 DIAGNOSIS — T82.594S: ICD-10-CM

## 2025-08-28 PROCEDURE — 25000003 PHARM REV CODE 250: Performed by: NURSE ANESTHETIST, CERTIFIED REGISTERED

## 2025-08-28 PROCEDURE — 71000016 HC POSTOP RECOV ADDL HR: Performed by: SURGERY

## 2025-08-28 PROCEDURE — 63600175 PHARM REV CODE 636 W HCPCS: Performed by: ANESTHESIOLOGY

## 2025-08-28 PROCEDURE — 37000008 HC ANESTHESIA 1ST 15 MINUTES: Performed by: SURGERY

## 2025-08-28 PROCEDURE — 88305 TISSUE EXAM BY PATHOLOGIST: CPT | Performed by: SURGERY

## 2025-08-28 PROCEDURE — 63600175 PHARM REV CODE 636 W HCPCS: Performed by: NURSE ANESTHETIST, CERTIFIED REGISTERED

## 2025-08-28 PROCEDURE — 36000707: Performed by: SURGERY

## 2025-08-28 PROCEDURE — 37000009 HC ANESTHESIA EA ADD 15 MINS: Performed by: SURGERY

## 2025-08-28 PROCEDURE — 36000706: Performed by: SURGERY

## 2025-08-28 PROCEDURE — 63600175 PHARM REV CODE 636 W HCPCS: Performed by: SURGERY

## 2025-08-28 PROCEDURE — 25000003 PHARM REV CODE 250: Performed by: ANESTHESIOLOGY

## 2025-08-28 PROCEDURE — 71000015 HC POSTOP RECOV 1ST HR: Performed by: SURGERY

## 2025-08-28 RX ORDER — CLINDAMYCIN PHOSPHATE 900 MG/50ML
900 INJECTION, SOLUTION INTRAVENOUS
Status: COMPLETED | OUTPATIENT
Start: 2025-08-28 | End: 2025-08-28

## 2025-08-28 RX ORDER — SODIUM CHLORIDE, SODIUM LACTATE, POTASSIUM CHLORIDE, CALCIUM CHLORIDE 600; 310; 30; 20 MG/100ML; MG/100ML; MG/100ML; MG/100ML
INJECTION, SOLUTION INTRAVENOUS CONTINUOUS
Status: DISCONTINUED | OUTPATIENT
Start: 2025-08-28 | End: 2025-08-28 | Stop reason: HOSPADM

## 2025-08-28 RX ORDER — FENTANYL CITRATE 50 UG/ML
INJECTION, SOLUTION INTRAMUSCULAR; INTRAVENOUS
Status: DISCONTINUED | OUTPATIENT
Start: 2025-08-28 | End: 2025-08-28

## 2025-08-28 RX ORDER — LIDOCAINE HYDROCHLORIDE 10 MG/ML
INJECTION, SOLUTION EPIDURAL; INFILTRATION; INTRACAUDAL; PERINEURAL
Status: DISCONTINUED | OUTPATIENT
Start: 2025-08-28 | End: 2025-08-28 | Stop reason: HOSPADM

## 2025-08-28 RX ORDER — PROPOFOL 10 MG/ML
VIAL (ML) INTRAVENOUS
Status: DISCONTINUED | OUTPATIENT
Start: 2025-08-28 | End: 2025-08-28

## 2025-08-28 RX ORDER — EPHEDRINE SULFATE 50 MG/ML
INJECTION, SOLUTION INTRAVENOUS
Status: DISCONTINUED | OUTPATIENT
Start: 2025-08-28 | End: 2025-08-28

## 2025-08-28 RX ORDER — MIDAZOLAM HYDROCHLORIDE 1 MG/ML
INJECTION INTRAMUSCULAR; INTRAVENOUS
Status: DISCONTINUED | OUTPATIENT
Start: 2025-08-28 | End: 2025-08-28

## 2025-08-28 RX ORDER — LIDOCAINE HYDROCHLORIDE 20 MG/ML
INJECTION, SOLUTION EPIDURAL; INFILTRATION; INTRACAUDAL; PERINEURAL
Status: DISCONTINUED | OUTPATIENT
Start: 2025-08-28 | End: 2025-08-28

## 2025-08-28 RX ORDER — DIAZEPAM 5 MG/1
10 TABLET ORAL
Status: DISCONTINUED | OUTPATIENT
Start: 2025-08-28 | End: 2025-08-28 | Stop reason: HOSPADM

## 2025-08-28 RX ORDER — BUPIVACAINE HYDROCHLORIDE 2.5 MG/ML
INJECTION, SOLUTION EPIDURAL; INFILTRATION; INTRACAUDAL; PERINEURAL
Status: DISCONTINUED | OUTPATIENT
Start: 2025-08-28 | End: 2025-08-28 | Stop reason: HOSPADM

## 2025-08-28 RX ADMIN — DIAZEPAM 10 MG: 5 TABLET ORAL at 08:08

## 2025-08-28 RX ADMIN — CLINDAMYCIN PHOSPHATE 900 MG: 900 INJECTION, SOLUTION INTRAVENOUS at 09:08

## 2025-08-28 RX ADMIN — FENTANYL CITRATE 25 MCG: 50 INJECTION, SOLUTION INTRAMUSCULAR; INTRAVENOUS at 10:08

## 2025-08-28 RX ADMIN — FENTANYL CITRATE 50 MCG: 50 INJECTION, SOLUTION INTRAMUSCULAR; INTRAVENOUS at 09:08

## 2025-08-28 RX ADMIN — PROPOFOL 50 MG: 10 INJECTION, EMULSION INTRAVENOUS at 09:08

## 2025-08-28 RX ADMIN — MIDAZOLAM 2 MG: 1 INJECTION INTRAMUSCULAR; INTRAVENOUS at 09:08

## 2025-08-28 RX ADMIN — SODIUM CHLORIDE, POTASSIUM CHLORIDE, SODIUM LACTATE AND CALCIUM CHLORIDE: 600; 310; 30; 20 INJECTION, SOLUTION INTRAVENOUS at 08:08

## 2025-08-28 RX ADMIN — PROPOFOL 20 MG: 10 INJECTION, EMULSION INTRAVENOUS at 10:08

## 2025-08-28 RX ADMIN — PROPOFOL 30 MG: 10 INJECTION, EMULSION INTRAVENOUS at 10:08

## 2025-08-28 RX ADMIN — LIDOCAINE HYDROCHLORIDE 60 MG: 20 INJECTION, SOLUTION EPIDURAL; INFILTRATION; INTRACAUDAL; PERINEURAL at 09:08

## 2025-08-28 RX ADMIN — EPHEDRINE SULFATE 25 MG: 50 INJECTION INTRAVENOUS at 10:08

## 2025-09-02 LAB — PSYCHE PATHOLOGY RESULT: NORMAL

## (undated) DEVICE — ADHESIVE DERMABOND ADVANCED

## (undated) DEVICE — Device

## (undated) DEVICE — GLOVE SIGNATURE ESSNTL LTX 6.5

## (undated) DEVICE — SUT PLN GUT 2-0 CT-3 1X27

## (undated) DEVICE — SUT 3-0 VICRYL SH CR/8 18

## (undated) DEVICE — DRESSING TRANS 4X4 TEGADERM

## (undated) DEVICE — GLOVE SENSICARE NEOPRENE 6.5

## (undated) DEVICE — DRAPE INCISE IOBAN 2 23X17IN

## (undated) DEVICE — GLOVE SENSICARE PI SURG 6.5